# Patient Record
Sex: FEMALE | Race: ASIAN | NOT HISPANIC OR LATINO
[De-identification: names, ages, dates, MRNs, and addresses within clinical notes are randomized per-mention and may not be internally consistent; named-entity substitution may affect disease eponyms.]

---

## 2017-12-06 PROBLEM — Z00.00 ENCOUNTER FOR PREVENTIVE HEALTH EXAMINATION: Status: ACTIVE | Noted: 2017-12-06

## 2017-12-14 ENCOUNTER — APPOINTMENT (OUTPATIENT)
Dept: UROLOGY | Facility: CLINIC | Age: 52
End: 2017-12-14

## 2022-10-13 ENCOUNTER — INPATIENT (INPATIENT)
Facility: HOSPITAL | Age: 57
LOS: 5 days | Discharge: HOME | DRG: 871 | End: 2022-10-19
Attending: INTERNAL MEDICINE | Admitting: INTERNAL MEDICINE
Payer: COMMERCIAL

## 2022-10-13 VITALS
TEMPERATURE: 101 F | SYSTOLIC BLOOD PRESSURE: 87 MMHG | HEART RATE: 126 BPM | OXYGEN SATURATION: 87 % | RESPIRATION RATE: 20 BRPM | WEIGHT: 113.98 LBS | DIASTOLIC BLOOD PRESSURE: 56 MMHG

## 2022-10-13 DIAGNOSIS — M54.2 CERVICALGIA: ICD-10-CM

## 2022-10-13 DIAGNOSIS — A41.9 SEPSIS, UNSPECIFIED ORGANISM: ICD-10-CM

## 2022-10-13 LAB
ALBUMIN SERPL ELPH-MCNC: 3 G/DL — LOW (ref 3.3–5)
ALBUMIN SERPL ELPH-MCNC: 3.3 G/DL — SIGNIFICANT CHANGE UP (ref 3.3–5)
ALP SERPL-CCNC: 130 U/L — HIGH (ref 40–120)
ALP SERPL-CCNC: 147 U/L — HIGH (ref 40–120)
ALT FLD-CCNC: 46 U/L — HIGH (ref 10–45)
ALT FLD-CCNC: 49 U/L — HIGH (ref 10–45)
ANION GAP SERPL CALC-SCNC: 10 MMOL/L — SIGNIFICANT CHANGE UP (ref 5–17)
ANION GAP SERPL CALC-SCNC: 14 MMOL/L — SIGNIFICANT CHANGE UP (ref 5–17)
ANION GAP SERPL CALC-SCNC: 9 MMOL/L — SIGNIFICANT CHANGE UP (ref 5–17)
APPEARANCE UR: CLEAR — SIGNIFICANT CHANGE UP
APTT BLD: 27.6 SEC — SIGNIFICANT CHANGE UP (ref 27.5–35.5)
AST SERPL-CCNC: 33 U/L — SIGNIFICANT CHANGE UP (ref 10–40)
AST SERPL-CCNC: 35 U/L — SIGNIFICANT CHANGE UP (ref 10–40)
BASE EXCESS BLDV CALC-SCNC: 2.4 MMOL/L — SIGNIFICANT CHANGE UP (ref -2–3)
BASE EXCESS BLDV CALC-SCNC: 2.5 MMOL/L — SIGNIFICANT CHANGE UP (ref -2–3)
BASE EXCESS BLDV CALC-SCNC: 5.6 MMOL/L — HIGH (ref -2–3)
BASOPHILS # BLD AUTO: 0.05 K/UL — SIGNIFICANT CHANGE UP (ref 0–0.2)
BASOPHILS NFR BLD AUTO: 0.4 % — SIGNIFICANT CHANGE UP (ref 0–2)
BILIRUB SERPL-MCNC: 0.4 MG/DL — SIGNIFICANT CHANGE UP (ref 0.2–1.2)
BILIRUB SERPL-MCNC: 0.7 MG/DL — SIGNIFICANT CHANGE UP (ref 0.2–1.2)
BILIRUB UR-MCNC: NEGATIVE — SIGNIFICANT CHANGE UP
BLD GP AB SCN SERPL QL: NEGATIVE — SIGNIFICANT CHANGE UP
BUN SERPL-MCNC: 12 MG/DL — SIGNIFICANT CHANGE UP (ref 7–23)
BUN SERPL-MCNC: 7 MG/DL — SIGNIFICANT CHANGE UP (ref 7–23)
BUN SERPL-MCNC: 9 MG/DL — SIGNIFICANT CHANGE UP (ref 7–23)
CA-I SERPL-SCNC: 1.2 MMOL/L — SIGNIFICANT CHANGE UP (ref 1.15–1.33)
CA-I SERPL-SCNC: 1.21 MMOL/L — SIGNIFICANT CHANGE UP (ref 1.15–1.33)
CA-I SERPL-SCNC: 1.22 MMOL/L — SIGNIFICANT CHANGE UP (ref 1.15–1.33)
CALCIUM SERPL-MCNC: 8.4 MG/DL — SIGNIFICANT CHANGE UP (ref 8.4–10.5)
CALCIUM SERPL-MCNC: 9 MG/DL — SIGNIFICANT CHANGE UP (ref 8.4–10.5)
CALCIUM SERPL-MCNC: 9.4 MG/DL — SIGNIFICANT CHANGE UP (ref 8.4–10.5)
CHLORIDE BLDV-SCNC: 101 MMOL/L — SIGNIFICANT CHANGE UP (ref 96–108)
CHLORIDE BLDV-SCNC: 110 MMOL/L — HIGH (ref 96–108)
CHLORIDE BLDV-SCNC: 99 MMOL/L — SIGNIFICANT CHANGE UP (ref 96–108)
CHLORIDE SERPL-SCNC: 107 MMOL/L — SIGNIFICANT CHANGE UP (ref 96–108)
CHLORIDE SERPL-SCNC: 110 MMOL/L — HIGH (ref 96–108)
CHLORIDE SERPL-SCNC: 96 MMOL/L — SIGNIFICANT CHANGE UP (ref 96–108)
CO2 BLDV-SCNC: 28 MMOL/L — HIGH (ref 22–26)
CO2 BLDV-SCNC: 29 MMOL/L — HIGH (ref 22–26)
CO2 BLDV-SCNC: 31 MMOL/L — HIGH (ref 22–26)
CO2 SERPL-SCNC: 25 MMOL/L — SIGNIFICANT CHANGE UP (ref 22–31)
CO2 SERPL-SCNC: 26 MMOL/L — SIGNIFICANT CHANGE UP (ref 22–31)
CO2 SERPL-SCNC: 27 MMOL/L — SIGNIFICANT CHANGE UP (ref 22–31)
COLOR SPEC: COLORLESS — SIGNIFICANT CHANGE UP
CREAT SERPL-MCNC: 0.53 MG/DL — SIGNIFICANT CHANGE UP (ref 0.5–1.3)
CREAT SERPL-MCNC: 0.54 MG/DL — SIGNIFICANT CHANGE UP (ref 0.5–1.3)
CREAT SERPL-MCNC: 0.82 MG/DL — SIGNIFICANT CHANGE UP (ref 0.5–1.3)
DIFF PNL FLD: NEGATIVE — SIGNIFICANT CHANGE UP
EGFR: 107 ML/MIN/1.73M2 — SIGNIFICANT CHANGE UP
EGFR: 108 ML/MIN/1.73M2 — SIGNIFICANT CHANGE UP
EGFR: 83 ML/MIN/1.73M2 — SIGNIFICANT CHANGE UP
EOSINOPHIL # BLD AUTO: 0.19 K/UL — SIGNIFICANT CHANGE UP (ref 0–0.5)
EOSINOPHIL NFR BLD AUTO: 1.4 % — SIGNIFICANT CHANGE UP (ref 0–6)
FLUAV AG NPH QL: SIGNIFICANT CHANGE UP
FLUBV AG NPH QL: SIGNIFICANT CHANGE UP
GAS PNL BLDV: 131 MMOL/L — LOW (ref 136–145)
GAS PNL BLDV: 133 MMOL/L — LOW (ref 136–145)
GAS PNL BLDV: 141 MMOL/L — SIGNIFICANT CHANGE UP (ref 136–145)
GAS PNL BLDV: SIGNIFICANT CHANGE UP
GLUCOSE BLDV-MCNC: 132 MG/DL — HIGH (ref 70–99)
GLUCOSE BLDV-MCNC: 132 MG/DL — HIGH (ref 70–99)
GLUCOSE BLDV-MCNC: 194 MG/DL — HIGH (ref 70–99)
GLUCOSE SERPL-MCNC: 128 MG/DL — HIGH (ref 70–99)
GLUCOSE SERPL-MCNC: 133 MG/DL — HIGH (ref 70–99)
GLUCOSE SERPL-MCNC: 236 MG/DL — HIGH (ref 70–99)
GLUCOSE UR QL: NEGATIVE — SIGNIFICANT CHANGE UP
HCO3 BLDV-SCNC: 27 MMOL/L — SIGNIFICANT CHANGE UP (ref 22–29)
HCO3 BLDV-SCNC: 27 MMOL/L — SIGNIFICANT CHANGE UP (ref 22–29)
HCO3 BLDV-SCNC: 30 MMOL/L — HIGH (ref 22–29)
HCT VFR BLD CALC: 21.5 % — LOW (ref 34.5–45)
HCT VFR BLD CALC: 26.1 % — LOW (ref 34.5–45)
HCT VFR BLDA CALC: 22 % — LOW (ref 34.5–46.5)
HCT VFR BLDA CALC: 22 % — LOW (ref 34.5–46.5)
HCT VFR BLDA CALC: 26 % — LOW (ref 34.5–46.5)
HGB BLD CALC-MCNC: 7.2 G/DL — LOW (ref 11.7–16.1)
HGB BLD CALC-MCNC: 7.2 G/DL — LOW (ref 11.7–16.1)
HGB BLD CALC-MCNC: 8.5 G/DL — LOW (ref 11.7–16.1)
HGB BLD-MCNC: 6.9 G/DL — CRITICAL LOW (ref 11.5–15.5)
HGB BLD-MCNC: 8.5 G/DL — LOW (ref 11.5–15.5)
IMM GRANULOCYTES NFR BLD AUTO: 0.9 % — SIGNIFICANT CHANGE UP (ref 0–0.9)
INR BLD: 1.51 RATIO — HIGH (ref 0.88–1.16)
KETONES UR-MCNC: SIGNIFICANT CHANGE UP
LACTATE BLDV-MCNC: 1 MMOL/L — SIGNIFICANT CHANGE UP (ref 0.5–2)
LACTATE BLDV-MCNC: 1 MMOL/L — SIGNIFICANT CHANGE UP (ref 0.5–2)
LACTATE BLDV-MCNC: 1.9 MMOL/L — SIGNIFICANT CHANGE UP (ref 0.5–2)
LEUKOCYTE ESTERASE UR-ACNC: NEGATIVE — SIGNIFICANT CHANGE UP
LIDOCAIN IGE QN: 30 U/L — SIGNIFICANT CHANGE UP (ref 7–60)
LYMPHOCYTES # BLD AUTO: 1.68 K/UL — SIGNIFICANT CHANGE UP (ref 1–3.3)
LYMPHOCYTES # BLD AUTO: 12 % — LOW (ref 13–44)
MAGNESIUM SERPL-MCNC: 2 MG/DL — SIGNIFICANT CHANGE UP (ref 1.6–2.6)
MAGNESIUM SERPL-MCNC: 2.2 MG/DL — SIGNIFICANT CHANGE UP (ref 1.6–2.6)
MCHC RBC-ENTMCNC: 29 PG — SIGNIFICANT CHANGE UP (ref 27–34)
MCHC RBC-ENTMCNC: 29.2 PG — SIGNIFICANT CHANGE UP (ref 27–34)
MCHC RBC-ENTMCNC: 32.1 GM/DL — SIGNIFICANT CHANGE UP (ref 32–36)
MCHC RBC-ENTMCNC: 32.6 GM/DL — SIGNIFICANT CHANGE UP (ref 32–36)
MCV RBC AUTO: 89.7 FL — SIGNIFICANT CHANGE UP (ref 80–100)
MCV RBC AUTO: 90.3 FL — SIGNIFICANT CHANGE UP (ref 80–100)
MONOCYTES # BLD AUTO: 0.73 K/UL — SIGNIFICANT CHANGE UP (ref 0–0.9)
MONOCYTES NFR BLD AUTO: 5.2 % — SIGNIFICANT CHANGE UP (ref 2–14)
NEUTROPHILS # BLD AUTO: 11.2 K/UL — HIGH (ref 1.8–7.4)
NEUTROPHILS NFR BLD AUTO: 80.1 % — HIGH (ref 43–77)
NITRITE UR-MCNC: NEGATIVE — SIGNIFICANT CHANGE UP
NRBC # BLD: 0 /100 WBCS — SIGNIFICANT CHANGE UP (ref 0–0)
NRBC # BLD: 0 /100 WBCS — SIGNIFICANT CHANGE UP (ref 0–0)
PCO2 BLDV: 41 MMHG — SIGNIFICANT CHANGE UP (ref 39–42)
PCO2 BLDV: 42 MMHG — SIGNIFICANT CHANGE UP (ref 39–42)
PCO2 BLDV: 43 MMHG — HIGH (ref 39–42)
PH BLDV: 7.41 — SIGNIFICANT CHANGE UP (ref 7.32–7.43)
PH BLDV: 7.42 — SIGNIFICANT CHANGE UP (ref 7.32–7.43)
PH BLDV: 7.47 — HIGH (ref 7.32–7.43)
PH UR: 6.5 — SIGNIFICANT CHANGE UP (ref 5–8)
PHOSPHATE SERPL-MCNC: 1.8 MG/DL — LOW (ref 2.5–4.5)
PHOSPHATE SERPL-MCNC: 2.8 MG/DL — SIGNIFICANT CHANGE UP (ref 2.5–4.5)
PLATELET # BLD AUTO: 308 K/UL — SIGNIFICANT CHANGE UP (ref 150–400)
PLATELET # BLD AUTO: 318 K/UL — SIGNIFICANT CHANGE UP (ref 150–400)
PO2 BLDV: 39 MMHG — SIGNIFICANT CHANGE UP (ref 25–45)
PO2 BLDV: 45 MMHG — SIGNIFICANT CHANGE UP (ref 25–45)
PO2 BLDV: 46 MMHG — HIGH (ref 25–45)
POTASSIUM BLDV-SCNC: 2.4 MMOL/L — CRITICAL LOW (ref 3.5–5.1)
POTASSIUM BLDV-SCNC: 2.7 MMOL/L — CRITICAL LOW (ref 3.5–5.1)
POTASSIUM BLDV-SCNC: 3.8 MMOL/L — SIGNIFICANT CHANGE UP (ref 3.5–5.1)
POTASSIUM SERPL-MCNC: 2.7 MMOL/L — CRITICAL LOW (ref 3.5–5.3)
POTASSIUM SERPL-MCNC: 2.9 MMOL/L — CRITICAL LOW (ref 3.5–5.3)
POTASSIUM SERPL-MCNC: 4 MMOL/L — SIGNIFICANT CHANGE UP (ref 3.5–5.3)
POTASSIUM SERPL-SCNC: 2.7 MMOL/L — CRITICAL LOW (ref 3.5–5.3)
POTASSIUM SERPL-SCNC: 2.9 MMOL/L — CRITICAL LOW (ref 3.5–5.3)
POTASSIUM SERPL-SCNC: 4 MMOL/L — SIGNIFICANT CHANGE UP (ref 3.5–5.3)
PROCALCITONIN SERPL-MCNC: 6.82 NG/ML — HIGH (ref 0.02–0.1)
PROT SERPL-MCNC: 6 G/DL — SIGNIFICANT CHANGE UP (ref 6–8.3)
PROT SERPL-MCNC: 6.8 G/DL — SIGNIFICANT CHANGE UP (ref 6–8.3)
PROT UR-MCNC: NEGATIVE — SIGNIFICANT CHANGE UP
PROTHROM AB SERPL-ACNC: 17.6 SEC — HIGH (ref 10.5–13.4)
RAPID RVP RESULT: SIGNIFICANT CHANGE UP
RAPID RVP RESULT: SIGNIFICANT CHANGE UP
RBC # BLD: 2.38 M/UL — LOW (ref 3.8–5.2)
RBC # BLD: 2.91 M/UL — LOW (ref 3.8–5.2)
RBC # FLD: 13.1 % — SIGNIFICANT CHANGE UP (ref 10.3–14.5)
RBC # FLD: 13.4 % — SIGNIFICANT CHANGE UP (ref 10.3–14.5)
RH IG SCN BLD-IMP: POSITIVE — SIGNIFICANT CHANGE UP
RSV RNA NPH QL NAA+NON-PROBE: SIGNIFICANT CHANGE UP
SAO2 % BLDV: 64.7 % — LOW (ref 67–88)
SAO2 % BLDV: 74.9 % — SIGNIFICANT CHANGE UP (ref 67–88)
SAO2 % BLDV: 79.2 % — SIGNIFICANT CHANGE UP (ref 67–88)
SARS-COV-2 RNA SPEC QL NAA+PROBE: SIGNIFICANT CHANGE UP
SODIUM SERPL-SCNC: 136 MMOL/L — SIGNIFICANT CHANGE UP (ref 135–145)
SODIUM SERPL-SCNC: 143 MMOL/L — SIGNIFICANT CHANGE UP (ref 135–145)
SODIUM SERPL-SCNC: 145 MMOL/L — SIGNIFICANT CHANGE UP (ref 135–145)
SP GR SPEC: 1.01 — LOW (ref 1.01–1.02)
T4 FREE SERPL-MCNC: 4.8 NG/DL — HIGH (ref 0.9–1.8)
TROPONIN T, HIGH SENSITIVITY RESULT: 229 NG/L — HIGH (ref 0–51)
TROPONIN T, HIGH SENSITIVITY RESULT: 53 NG/L — HIGH (ref 0–51)
TROPONIN T, HIGH SENSITIVITY RESULT: 91 NG/L — HIGH (ref 0–51)
UROBILINOGEN FLD QL: NEGATIVE — SIGNIFICANT CHANGE UP
WBC # BLD: 13.98 K/UL — HIGH (ref 3.8–10.5)
WBC # BLD: 9.82 K/UL — SIGNIFICANT CHANGE UP (ref 3.8–10.5)
WBC # FLD AUTO: 13.98 K/UL — HIGH (ref 3.8–10.5)
WBC # FLD AUTO: 9.82 K/UL — SIGNIFICANT CHANGE UP (ref 3.8–10.5)

## 2022-10-13 PROCEDURE — 70491 CT SOFT TISSUE NECK W/DYE: CPT | Mod: 26

## 2022-10-13 PROCEDURE — 31575 DIAGNOSTIC LARYNGOSCOPY: CPT

## 2022-10-13 PROCEDURE — 71045 X-RAY EXAM CHEST 1 VIEW: CPT | Mod: 26

## 2022-10-13 PROCEDURE — 99291 CRITICAL CARE FIRST HOUR: CPT

## 2022-10-13 PROCEDURE — 99291 CRITICAL CARE FIRST HOUR: CPT | Mod: 25,GC

## 2022-10-13 PROCEDURE — 71275 CT ANGIOGRAPHY CHEST: CPT | Mod: 26

## 2022-10-13 PROCEDURE — 93308 TTE F-UP OR LMTD: CPT | Mod: 26

## 2022-10-13 PROCEDURE — 99222 1ST HOSP IP/OBS MODERATE 55: CPT | Mod: 25

## 2022-10-13 RX ORDER — PIPERACILLIN AND TAZOBACTAM 4; .5 G/20ML; G/20ML
3.38 INJECTION, POWDER, LYOPHILIZED, FOR SOLUTION INTRAVENOUS ONCE
Refills: 0 | Status: DISCONTINUED | OUTPATIENT
Start: 2022-10-13 | End: 2022-10-19

## 2022-10-13 RX ORDER — POTASSIUM CHLORIDE 20 MEQ
10 PACKET (EA) ORAL
Refills: 0 | Status: DISCONTINUED | OUTPATIENT
Start: 2022-10-13 | End: 2022-10-13

## 2022-10-13 RX ORDER — HYDROCORTISONE 20 MG
100 TABLET ORAL ONCE
Refills: 0 | Status: COMPLETED | OUTPATIENT
Start: 2022-10-13 | End: 2022-10-13

## 2022-10-13 RX ORDER — CHLORHEXIDINE GLUCONATE 213 G/1000ML
1 SOLUTION TOPICAL
Refills: 0 | Status: DISCONTINUED | OUTPATIENT
Start: 2022-10-13 | End: 2022-10-19

## 2022-10-13 RX ORDER — ACETAMINOPHEN 500 MG
1000 TABLET ORAL ONCE
Refills: 0 | Status: COMPLETED | OUTPATIENT
Start: 2022-10-13 | End: 2022-10-13

## 2022-10-13 RX ORDER — POTASSIUM CHLORIDE 20 MEQ
40 PACKET (EA) ORAL ONCE
Refills: 0 | Status: COMPLETED | OUTPATIENT
Start: 2022-10-13 | End: 2022-10-13

## 2022-10-13 RX ORDER — SODIUM CHLORIDE 9 MG/ML
1000 INJECTION, SOLUTION INTRAVENOUS ONCE
Refills: 0 | Status: COMPLETED | OUTPATIENT
Start: 2022-10-13 | End: 2022-10-13

## 2022-10-13 RX ORDER — SODIUM CHLORIDE 9 MG/ML
1600 INJECTION, SOLUTION INTRAVENOUS ONCE
Refills: 0 | Status: COMPLETED | OUTPATIENT
Start: 2022-10-13 | End: 2022-10-13

## 2022-10-13 RX ORDER — POTASSIUM PHOSPHATE, MONOBASIC POTASSIUM PHOSPHATE, DIBASIC 236; 224 MG/ML; MG/ML
30 INJECTION, SOLUTION INTRAVENOUS ONCE
Refills: 0 | Status: COMPLETED | OUTPATIENT
Start: 2022-10-13 | End: 2022-10-13

## 2022-10-13 RX ORDER — ENOXAPARIN SODIUM 100 MG/ML
40 INJECTION SUBCUTANEOUS EVERY 24 HOURS
Refills: 0 | Status: DISCONTINUED | OUTPATIENT
Start: 2022-10-13 | End: 2022-10-19

## 2022-10-13 RX ORDER — VANCOMYCIN HCL 1 G
750 VIAL (EA) INTRAVENOUS ONCE
Refills: 0 | Status: COMPLETED | OUTPATIENT
Start: 2022-10-13 | End: 2022-10-13

## 2022-10-13 RX ORDER — PANTOPRAZOLE SODIUM 20 MG/1
40 TABLET, DELAYED RELEASE ORAL DAILY
Refills: 0 | Status: DISCONTINUED | OUTPATIENT
Start: 2022-10-13 | End: 2022-10-13

## 2022-10-13 RX ORDER — MAGNESIUM SULFATE 500 MG/ML
1 VIAL (ML) INJECTION ONCE
Refills: 0 | Status: COMPLETED | OUTPATIENT
Start: 2022-10-13 | End: 2022-10-13

## 2022-10-13 RX ORDER — PIPERACILLIN AND TAZOBACTAM 4; .5 G/20ML; G/20ML
3.38 INJECTION, POWDER, LYOPHILIZED, FOR SOLUTION INTRAVENOUS EVERY 8 HOURS
Refills: 0 | Status: DISCONTINUED | OUTPATIENT
Start: 2022-10-14 | End: 2022-10-19

## 2022-10-13 RX ORDER — PIPERACILLIN AND TAZOBACTAM 4; .5 G/20ML; G/20ML
3.38 INJECTION, POWDER, LYOPHILIZED, FOR SOLUTION INTRAVENOUS ONCE
Refills: 0 | Status: COMPLETED | OUTPATIENT
Start: 2022-10-13 | End: 2022-10-13

## 2022-10-13 RX ORDER — PIPERACILLIN AND TAZOBACTAM 4; .5 G/20ML; G/20ML
3.38 INJECTION, POWDER, LYOPHILIZED, FOR SOLUTION INTRAVENOUS ONCE
Refills: 0 | Status: COMPLETED | OUTPATIENT
Start: 2022-10-14 | End: 2022-10-13

## 2022-10-13 RX ORDER — VANCOMYCIN HCL 1 G
VIAL (EA) INTRAVENOUS
Refills: 0 | Status: DISCONTINUED | OUTPATIENT
Start: 2022-10-13 | End: 2022-10-13

## 2022-10-13 RX ORDER — NOREPINEPHRINE BITARTRATE/D5W 8 MG/250ML
0.05 PLASTIC BAG, INJECTION (ML) INTRAVENOUS
Qty: 8 | Refills: 0 | Status: DISCONTINUED | OUTPATIENT
Start: 2022-10-13 | End: 2022-10-13

## 2022-10-13 RX ADMIN — PANTOPRAZOLE SODIUM 40 MILLIGRAM(S): 20 TABLET, DELAYED RELEASE ORAL at 13:16

## 2022-10-13 RX ADMIN — SODIUM CHLORIDE 1000 MILLILITER(S): 9 INJECTION, SOLUTION INTRAVENOUS at 09:55

## 2022-10-13 RX ADMIN — Medication 40 MILLIEQUIVALENT(S): at 17:32

## 2022-10-13 RX ADMIN — PIPERACILLIN AND TAZOBACTAM 200 GRAM(S): 4; .5 INJECTION, POWDER, LYOPHILIZED, FOR SOLUTION INTRAVENOUS at 07:40

## 2022-10-13 RX ADMIN — Medication 1000 MILLIGRAM(S): at 08:00

## 2022-10-13 RX ADMIN — SODIUM CHLORIDE 1000 MILLILITER(S): 9 INJECTION, SOLUTION INTRAVENOUS at 08:55

## 2022-10-13 RX ADMIN — CHLORHEXIDINE GLUCONATE 1 APPLICATION(S): 213 SOLUTION TOPICAL at 13:16

## 2022-10-13 RX ADMIN — Medication 100 MILLIEQUIVALENT(S): at 10:30

## 2022-10-13 RX ADMIN — PIPERACILLIN AND TAZOBACTAM 3.38 GRAM(S): 4; .5 INJECTION, POWDER, LYOPHILIZED, FOR SOLUTION INTRAVENOUS at 08:20

## 2022-10-13 RX ADMIN — Medication 400 MILLIGRAM(S): at 07:35

## 2022-10-13 RX ADMIN — POTASSIUM PHOSPHATE, MONOBASIC POTASSIUM PHOSPHATE, DIBASIC 83.33 MILLIMOLE(S): 236; 224 INJECTION, SOLUTION INTRAVENOUS at 18:02

## 2022-10-13 RX ADMIN — Medication 1000 MILLIGRAM(S): at 07:55

## 2022-10-13 RX ADMIN — Medication 4.85 MICROGRAM(S)/KG/MIN: at 09:35

## 2022-10-13 RX ADMIN — Medication 40 MILLIEQUIVALENT(S): at 09:25

## 2022-10-13 RX ADMIN — PIPERACILLIN AND TAZOBACTAM 200 GRAM(S): 4; .5 INJECTION, POWDER, LYOPHILIZED, FOR SOLUTION INTRAVENOUS at 13:16

## 2022-10-13 RX ADMIN — Medication 1 GRAM(S): at 10:30

## 2022-10-13 RX ADMIN — SODIUM CHLORIDE 1600 MILLILITER(S): 9 INJECTION, SOLUTION INTRAVENOUS at 07:30

## 2022-10-13 RX ADMIN — Medication 20 MILLIGRAM(S): at 18:35

## 2022-10-13 RX ADMIN — Medication 100 GRAM(S): at 09:26

## 2022-10-13 RX ADMIN — Medication 40 MILLIEQUIVALENT(S): at 20:26

## 2022-10-13 RX ADMIN — SODIUM CHLORIDE 1600 MILLILITER(S): 9 INJECTION, SOLUTION INTRAVENOUS at 08:30

## 2022-10-13 RX ADMIN — Medication 100 MILLIGRAM(S): at 08:21

## 2022-10-13 RX ADMIN — Medication 250 MILLIGRAM(S): at 08:27

## 2022-10-13 RX ADMIN — PIPERACILLIN AND TAZOBACTAM 25 GRAM(S): 4; .5 INJECTION, POWDER, LYOPHILIZED, FOR SOLUTION INTRAVENOUS at 23:16

## 2022-10-13 NOTE — ED ADULT NURSE NOTE - ED STAT RN HANDOFF DETAILS
hand off given to break coverage RN. Resp paged. ICU team paged. Pt to go to CT first prior to transport to MICU with team in attendance. A&Ox4. Levophed infusing via IV pump at 4.85 ml/hr. Potassium infusing via IV pump hand off given to break coverage ELYSSA Valdez. Resp paged. ICU team paged. Pt to go to CT first prior to transport to MICU with team in attendance. A&Ox4. Levophed infusing via IV pump at 4.85 ml/hr. Potassium infusing via IV pump

## 2022-10-13 NOTE — CHART NOTE - NSCHARTNOTEFT_GEN_A_CORE
MICU DOWN GRADE NOTE    ----------------------------------------      Transfer from: MICU   Transfer to: ( X  ) Medicine- with continuous Pulse Ox         (  ) Telemetry         (   )  RCU     (   ) Palliative          (    ) Stroke Unit    Accepting Physician:   ----------------------------------------  HPI:    Sanna Allen is a 56F with hx of HTN, RA on leflunomide, anxiety who p/w fever, chills, cough, n/v, decreased po intake. Patient reports that she received an EGD two weeks ago and developed a sore throat shortly thereafter. Since then has had worsening neck pain. Around 9 days ago started developing fevers T max 107, chills, diaphoresis that did not resolve despite taking tylenol. Went to PCP who prescribed azithromycin and levofloxacin, completed course. however continued to have fevers, chills. Became nauseous a few days ago and started vomiting soon after she starts eating. NBNB, just food colored. Subsequent decreased pO intake, only able to take rice pudding. Reports that her neck and jaw continue to hurt. No dysphagia, no abdominal pain, no cough, chest pain. This morning reports that she took her pulse ox and found to have low oxygen sat mid80s% and came to the ED for further evaluation. Last steroids was in 2022, x2 weeks, completed.    Patient accepted to MICU for airway monitoring and protection. In the ICU, patient was weaned off pressors, remained hemodynamically stable. Zosyn was started for RADHA PNA, CT Neck found diffuse thyroiditis.    NEURO:  #Mental status: baseline AAO4  -Currently A&O x 4  - Continue to mon	or neurological status    CV:  #Septic shock, suspected viral syndrome complicated by PNA vs Aspiration (resolved)  - started on Levophed but able to titrate off while in ICU  - Continue Zosyn for aspiration PNA (already rcvd atypical coverage as outpatient)  - POCUS with B-lines on left; preserved RV/LV; LV>RV    # Tropinemia      # hx of HTN    PULM:  #Acute Hypoxic Respiratory Failure due to multilobar PNA  - Currently on HFNC 50%/40Lpm  - Wean down as tolerated    RENAL:  - no acute issues  - continue to monitor per renal function per ICU protocol    GI:  - diet regular  - hx of gerd  - c/w protonix 40 mg IV PPI    ENDO:  # radiographic evidence of thyroiditis  - check TFTs    METABOLIC:  # Electrolyte Abnormalities  K+ 2.7 on latest BMP; Phos 1.8 as well; likely in s/o recurrent vomiting   - Repleted with KCl and KPhos   - reassess on follow-up BMP    HEMATOLOGIC:  #DVT prophylaxis with lovenox    ID:  #sepsis 2/2 multilobar pneumonia  - Continue Zosyn for possible aspiration PNA (already rcvd atypical coverage as outpatient)    SKIN:  - no acute issues    To Follow UP  [ ] Wean down on HFNC as able   [ ] Continue Abx for PNA  [ ] Follow-up with GI doc Dr. Jermaine Hill regarding outpt endoscopy reason and results        - Phone number is [phone]    REVIEW OF SYSTEMS:  CONSTITUTIONAL: +fever, chills, night sweats, =fatigue  EYES: No eye pain, visual disturbances, or discharge  ENMT:  No difficulty hearing, tinnitus, vertigo; No sinus or throat pain  NECK: +pain, no stiffness  RESPIRATORY: No cough, wheezing, or hemoptysis; +shortness of breath  CARDIOVASCULAR: No chest pain, palpitations, dizziness, or leg swelling  GASTROINTESTINAL: No abdominal or epigastric pain. +nausea, vomiting, no hematemesis; No diarrhea or constipation. No melena or hematochezia.  GENITOURINARY: No dysuria, frequency, hematuria, or incontinence  NEUROLOGICAL: No headaches, memory loss, loss of strength, numbness, or tremors  SKIN: No itching, burning, rashes, or lesions   LYMPH NODES: No enlarged glands  ENDOCRINE: No heat or cold intolerance; No hair loss  MUSCULOSKELETAL: No joint pain or swelling; No muscle, back, or extremity pain  PSYCHIATRIC: No depression, anxiety, mood swings, or difficulty sleeping  HEME/LYMPH: No easy bruising, or bleeding gums  ALLERGY AND IMMUNOLOGIC: No hives or eczema    MEDICATIONS:  chlorhexidine 4% Liquid 1 Application(s) Topical <User Schedule>  enoxaparin Injectable 40 milliGRAM(s) SubCutaneous every 24 hours  norepinephrine Infusion 0.05 MICROgram(s)/kG/Min IV Continuous <Continuous>  pantoprazole  Injectable 40 milliGRAM(s) IV Push daily  PARoxetine 20 milliGRAM(s) Oral daily  piperacillin/tazobactam IVPB.. 3.375 Gram(s) IV Intermittent once  potassium chloride   Powder 40 milliEquivalent(s) Oral once  potassium chloride  10 mEq/100 mL IVPB 10 milliEquivalent(s) IV Intermittent every 1 hour  potassium phosphate IVPB 30 milliMole(s) IV Intermittent once      T(C): 36.6 (10-13-22 @ 16:00), Max: 39.4 (10-13-22 @ 07:26)  HR: 92 (10-13-22 @ 16:50) (82 - 193)  BP: 109/57 (10-13-22 @ 16:00) (87/47 - 138/73)  RR: 25 (10-13-22 @ 16:50) (18 - 32)  SpO2: 94% (10-13-22 @ 16:50) (87% - 100%)  Wt(kg): --Vital Signs Last 24 Hrs  T(C): 36.6 (13 Oct 2022 16:00), Max: 39.4 (13 Oct 2022 07:26)  T(F): 97.8 (13 Oct 2022 16:00), Max: 103 (13 Oct 2022 07:26)  HR: 92 (13 Oct 2022 16:50) (82 - 193)  BP: 109/57 (13 Oct 2022 16:00) (87/47 - 138/73)  BP(mean): 78 (13 Oct 2022 16:00) (70 - 98)  RR: 25 (13 Oct 2022 16:50) (18 - 32)  SpO2: 94% (13 Oct 2022 16:50) (87% - 100%)    Parameters below as of 13 Oct 2022 16:50  Patient On (Oxygen Delivery Method): room air    PHYSICAL EXAM:  GENERAL: lying comfortable in bed, in no acute distress  HENMT: Atraumatic, dry mucous membranes, no oropharyngeal exudates or vesicles, uvula is midline, tender to palpation at b/l jaw and right neck, no masses or nodules, no edema, no fluctuance noted  EYES: Clear bilaterally, PERRL, EOMs intact b/l  HEART: RRR, S1/S2, no murmur/gallops/rubs  RESPIRATORY: bibasilar crackles L>R, no wheezes or rhonchi  ABDOMEN: +BS, soft, nontender, nondistended  EXTREMITIES: No lower extremity edema, +2 radial pulses b/l  NEURO:  A&Ox4, no focal motor deficits or sensory deficits   Heme/LYMPH: No ecchymosis or bruising, no anterior/posterior cervical or supraclavicular LAD  SKIN:  Skin normal color for race, warm, dry and intact. No evidence of rash    Consultant(s) Notes Reviewed:  [x ] YES  [ ] NO  Care Discussed with Consultants/Other Providers [ x] YES  [ ] NO    LABS:                        8.5    13.98 )-----------( 318      ( 13 Oct 2022 08:17 )             26.1     10-13    143  |  107  |  7   ----------------------------<  236<H>  2.7<LL>   |  27  |  0.54    Ca    9.0      13 Oct 2022 16:05  Phos  1.8     10-13  Mg     2.2     10-13    TPro  6.0  /  Alb  3.0<L>  /  TBili  0.4  /  DBili  x   /  AST  35  /  ALT  46<H>  /  AlkPhos  130<H>  10-13    PT/INR - ( 13 Oct 2022 08:17 )   PT: 17.6 sec;   INR: 1.51 ratio         PTT - ( 13 Oct 2022 08:17 )  PTT:27.6 sec  Urinalysis Basic - ( 13 Oct 2022 10:48 )    Color: Colorless / Appearance: Clear / S.006 / pH: x  Gluc: x / Ketone: Trace  / Bili: Negative / Urobili: Negative   Blood: x / Protein: Negative / Nitrite: Negative   Leuk Esterase: Negative / RBC: x / WBC x   Sq Epi: x / Non Sq Epi: x / Bacteria: x      CAPILLARY BLOOD GLUCOSE    Urinalysis Basic - ( 13 Oct 2022 10:48 )    Color: Colorless / Appearance: Clear / S.006 / pH: x  Gluc: x / Ketone: Trace  / Bili: Negative / Urobili: Negative   Blood: x / Protein: Negative / Nitrite: Negative   Leuk Esterase: Negative / RBC: x / WBC x   Sq Epi: x / Non Sq Epi: x / Bacteria: x        RADIOLOGY & ADDITIONAL TESTS:    Imaging Personally Reviewed:  [x ] YES  [ ] NO MICU DOWN GRADE NOTE    ----------------------------------------      Transfer from: MICU   Transfer to: ( X  ) Medicine      (  ) Telemetry         (   )  RCU     (   ) Palliative          (    ) Stroke Unit    Accepting Physician: Dr Delmar Camarillo  ----------------------------------------  HPI:    Sanna Allen is a 56F with hx of HTN, RA on leflunomide, anxiety who p/w fever, chills, cough, n/v, decreased po intake. Patient reports that she received an EGD two weeks ago and developed a sore throat shortly thereafter. Since then has had worsening neck pain. Around 9 days ago started developing fevers T max 107, chills, diaphoresis that did not resolve despite taking tylenol. Went to PCP who prescribed azithromycin and levofloxacin, completed course. however continued to have fevers, chills. Became nauseous a few days ago and started vomiting soon after she starts eating. NBNB, just food colored. Subsequent decreased pO intake, only able to take rice pudding. Reports that her neck and jaw continue to hurt. No dysphagia, no abdominal pain, no cough, chest pain. This morning reports that she took her pulse ox and found to have low oxygen sat mid80s% and came to the ED for further evaluation. Last steroids was in 2022, x2 weeks, completed.    Patient accepted to MICU for airway monitoring and protection. In the ICU, patient was weaned off pressors, remained hemodynamically stable. Zosyn was started for RADHA PNA, CT Neck found diffuse thyroiditis.    NEURO:  #Mental status: baseline AAO4  -Currently A&O x 4  - Continue to mon	or neurological status    CV:  #Septic shock, suspected viral syndrome complicated by PNA vs Aspiration (resolved)  - started on Levophed but able to titrate off while in ICU  - Continue Zosyn for aspiration PNA (already rcvd atypical coverage as outpatient)  - POCUS with B-lines on left; preserved RV/LV; LV>RV    # Tropinemia      # hx of HTN    PULM:  #Acute Hypoxic Respiratory Failure due to multilobar PNA  - Currently on HFNC 50%/40Lpm  - Wean down as tolerated    RENAL:  - no acute issues  - continue to monitor per renal function per ICU protocol    GI:  - diet regular  - hx of gerd  - c/w protonix 40 mg IV PPI    ENDO:  # radiographic evidence of thyroiditis  - check TFTs    METABOLIC:  # Electrolyte Abnormalities  K+ 2.7 on latest BMP; Phos 1.8 as well; likely in s/o recurrent vomiting   - Repleted with KCl and KPhos   - reassess on follow-up BMP    HEMATOLOGIC:  #DVT prophylaxis with lovenox    ID:  #sepsis 2/2 multilobar pneumonia  - Continue Zosyn for possible aspiration PNA (already rcvd atypical coverage as outpatient)    SKIN:  - no acute issues    To Follow UP  [ ] Wean down on HFNC as able   [ ] Continue Abx for PNA  [ ] Follow-up with GI doc Dr. Jermaine Hill regarding outpt endoscopy reason and results        - Phone number is [phone]    REVIEW OF SYSTEMS:  CONSTITUTIONAL: +fever, chills, night sweats, =fatigue  EYES: No eye pain, visual disturbances, or discharge  ENMT:  No difficulty hearing, tinnitus, vertigo; No sinus or throat pain  NECK: +pain, no stiffness  RESPIRATORY: No cough, wheezing, or hemoptysis; +shortness of breath  CARDIOVASCULAR: No chest pain, palpitations, dizziness, or leg swelling  GASTROINTESTINAL: No abdominal or epigastric pain. +nausea, vomiting, no hematemesis; No diarrhea or constipation. No melena or hematochezia.  GENITOURINARY: No dysuria, frequency, hematuria, or incontinence  NEUROLOGICAL: No headaches, memory loss, loss of strength, numbness, or tremors  SKIN: No itching, burning, rashes, or lesions   LYMPH NODES: No enlarged glands  ENDOCRINE: No heat or cold intolerance; No hair loss  MUSCULOSKELETAL: No joint pain or swelling; No muscle, back, or extremity pain  PSYCHIATRIC: No depression, anxiety, mood swings, or difficulty sleeping  HEME/LYMPH: No easy bruising, or bleeding gums  ALLERGY AND IMMUNOLOGIC: No hives or eczema    MEDICATIONS:  chlorhexidine 4% Liquid 1 Application(s) Topical <User Schedule>  enoxaparin Injectable 40 milliGRAM(s) SubCutaneous every 24 hours  norepinephrine Infusion 0.05 MICROgram(s)/kG/Min IV Continuous <Continuous>  pantoprazole  Injectable 40 milliGRAM(s) IV Push daily  PARoxetine 20 milliGRAM(s) Oral daily  piperacillin/tazobactam IVPB.. 3.375 Gram(s) IV Intermittent once  potassium chloride   Powder 40 milliEquivalent(s) Oral once  potassium chloride  10 mEq/100 mL IVPB 10 milliEquivalent(s) IV Intermittent every 1 hour  potassium phosphate IVPB 30 milliMole(s) IV Intermittent once      T(C): 36.6 (10-13-22 @ 16:00), Max: 39.4 (10-13-22 @ 07:26)  HR: 92 (10-13-22 @ 16:50) (82 - 193)  BP: 109/57 (10-13-22 @ 16:00) (87/47 - 138/73)  RR: 25 (10-13-22 @ 16:50) (18 - 32)  SpO2: 94% (10-13-22 @ 16:50) (87% - 100%)  Wt(kg): --Vital Signs Last 24 Hrs  T(C): 36.6 (13 Oct 2022 16:00), Max: 39.4 (13 Oct 2022 07:26)  T(F): 97.8 (13 Oct 2022 16:00), Max: 103 (13 Oct 2022 07:26)  HR: 92 (13 Oct 2022 16:50) (82 - 193)  BP: 109/57 (13 Oct 2022 16:00) (87/47 - 138/73)  BP(mean): 78 (13 Oct 2022 16:00) (70 - 98)  RR: 25 (13 Oct 2022 16:50) (18 - 32)  SpO2: 94% (13 Oct 2022 16:50) (87% - 100%)    Parameters below as of 13 Oct 2022 16:50  Patient On (Oxygen Delivery Method): room air    PHYSICAL EXAM:  GENERAL: lying comfortable in bed, in no acute distress  HENMT: Atraumatic, dry mucous membranes, no oropharyngeal exudates or vesicles, uvula is midline, tender to palpation at b/l jaw and right neck, no masses or nodules, no edema, no fluctuance noted  EYES: Clear bilaterally, PERRL, EOMs intact b/l  HEART: RRR, S1/S2, no murmur/gallops/rubs  RESPIRATORY: bibasilar crackles L>R, no wheezes or rhonchi  ABDOMEN: +BS, soft, nontender, nondistended  EXTREMITIES: No lower extremity edema, +2 radial pulses b/l  NEURO:  A&Ox4, no focal motor deficits or sensory deficits   Heme/LYMPH: No ecchymosis or bruising, no anterior/posterior cervical or supraclavicular LAD  SKIN:  Skin normal color for race, warm, dry and intact. No evidence of rash    Consultant(s) Notes Reviewed:  [x ] YES  [ ] NO  Care Discussed with Consultants/Other Providers [ x] YES  [ ] NO    LABS:                        8.5    13.98 )-----------( 318      ( 13 Oct 2022 08:17 )             26.1     10-    143  |  107  |  7   ----------------------------<  236<H>  2.7<LL>   |  27  |  0.54    Ca    9.0      13 Oct 2022 16:05  Phos  1.8     10-13  Mg     2.2     10-13    TPro  6.0  /  Alb  3.0<L>  /  TBili  0.4  /  DBili  x   /  AST  35  /  ALT  46<H>  /  AlkPhos  130<H>  10-13    PT/INR - ( 13 Oct 2022 08:17 )   PT: 17.6 sec;   INR: 1.51 ratio         PTT - ( 13 Oct 2022 08:17 )  PTT:27.6 sec  Urinalysis Basic - ( 13 Oct 2022 10:48 )    Color: Colorless / Appearance: Clear / S.006 / pH: x  Gluc: x / Ketone: Trace  / Bili: Negative / Urobili: Negative   Blood: x / Protein: Negative / Nitrite: Negative   Leuk Esterase: Negative / RBC: x / WBC x   Sq Epi: x / Non Sq Epi: x / Bacteria: x      CAPILLARY BLOOD GLUCOSE    Urinalysis Basic - ( 13 Oct 2022 10:48 )    Color: Colorless / Appearance: Clear / S.006 / pH: x  Gluc: x / Ketone: Trace  / Bili: Negative / Urobili: Negative   Blood: x / Protein: Negative / Nitrite: Negative   Leuk Esterase: Negative / RBC: x / WBC x   Sq Epi: x / Non Sq Epi: x / Bacteria: x        RADIOLOGY & ADDITIONAL TESTS:    Imaging Personally Reviewed:  [x ] YES  [ ] NO MICU DOWN GRADE NOTE    ----------------------------------------      Transfer from: MICU   Transfer to: ( X  ) Medicine      (  ) Telemetry         (   )  RCU     (   ) Palliative          (    ) Stroke Unit    Accepting Physician: Dr Delmar Camarillo  ----------------------------------------  HPI:    Sanna Allen is a 56F with hx of HTN, RA on leflunomide, anxiety who p/w fever, chills, cough, n/v, decreased po intake. Patient reports that she received an EGD two weeks ago and developed a sore throat shortly thereafter. Since then has had worsening neck pain. Around 9 days ago started developing fevers T max 107, chills, diaphoresis that did not resolve despite taking tylenol. Went to PCP who prescribed azithromycin and levofloxacin, completed course. however continued to have fevers, chills. Became nauseous a few days ago and started vomiting soon after she starts eating. NBNB, just food colored. Subsequent decreased pO intake, only able to take rice pudding. Reports that her neck and jaw continue to hurt. No dysphagia, no abdominal pain, no cough, chest pain. This morning reports that she took her pulse ox and found to have low oxygen sat mid80s% and came to the ED for further evaluation. Last steroids was in 2022, x2 weeks, completed.    Patient accepted to MICU for airway monitoring and protection. In the ICU, patient was weaned off pressors, remained hemodynamically stable. Zosyn was started for RADHA PNA, CT Neck found diffuse thyroiditis.    NEURO:  #Mental status: baseline AAO4  -Currently A&O x 4  - Continue to mon	or neurological status    CV:  #Septic shock, suspected viral syndrome complicated by PNA vs Aspiration (resolved)  - started on Levophed but able to titrate off while in ICU  - Continue Zosyn for aspiration PNA (already rcvd atypical coverage as outpatient)  - POCUS with B-lines on left; preserved RV/LV; LV>RV    # Tropinemia  - likely demand, downtrending    # hx of HTN  - monitor q4H  - pressors off since 10/13 1pm 10/13    PULM:  #Acute Hypoxic Respiratory Failure due to multilobar PNA  - Currently on HFNC 50%/40Lpm  - Wean down as tolerated    RENAL:  - no acute issues  - continue to monitor per renal function per ICU protocol    GI:  - diet regular  - hx of gerd  - c/w protonix 40 mg IV PPI    ENDO:  # radiographic evidence of thyroiditis  - check TFTs    METABOLIC:  # Electrolyte Abnormalities  K+ 2.7 on latest BMP; Phos 1.8 as well; likely in s/o recurrent vomiting   - Repleted with KCl and KPhos   - reassess on follow-up BMP    HEMATOLOGIC:    #Anemia: admit hgb 8.5; downtrended this AM to 6.9/7 on repeat  - Suspect dilutional given 2.5L IVF given on admit  - Type and Screen x 2 done  - Trend Hgb and transfusion for hgb < 7 or s/s active bleed  - Currently no s/s active bleeding    #DVT prophylaxis with lovenox    ID:  #sepsis 2/2 multilobar pneumonia  - Continue Zosyn for possible aspiration PNA (already rcvd atypical coverage as outpatient)    SKIN:  - no acute issues    To Follow UP  [ ] Trend Hgb  [ ] Follow up thyroid function labs; consider Endo consult for thyroiditis   [ ] Continue Abx for PNA  [ ] Follow-up with GI doc Dr. Jermaine Hill regarding outpt endoscopy reason and results        - Phone number is [phone]    REVIEW OF SYSTEMS:  CONSTITUTIONAL: +fever, chills, night sweats, =fatigue  EYES: No eye pain, visual disturbances, or discharge  ENMT:  No difficulty hearing, tinnitus, vertigo; No sinus or throat pain  NECK: +pain, no stiffness  RESPIRATORY: No cough, wheezing, or hemoptysis; +shortness of breath  CARDIOVASCULAR: No chest pain, palpitations, dizziness, or leg swelling  GASTROINTESTINAL: No abdominal or epigastric pain. +nausea, vomiting, no hematemesis; No diarrhea or constipation. No melena or hematochezia.  GENITOURINARY: No dysuria, frequency, hematuria, or incontinence  NEUROLOGICAL: No headaches, memory loss, loss of strength, numbness, or tremors  SKIN: No itching, burning, rashes, or lesions   LYMPH NODES: No enlarged glands  ENDOCRINE: No heat or cold intolerance; No hair loss  MUSCULOSKELETAL: No joint pain or swelling; No muscle, back, or extremity pain  PSYCHIATRIC: No depression, anxiety, mood swings, or difficulty sleeping  HEME/LYMPH: No easy bruising, or bleeding gums  ALLERGY AND IMMUNOLOGIC: No hives or eczema    MEDICATIONS:  chlorhexidine 4% Liquid 1 Application(s) Topical <User Schedule>  enoxaparin Injectable 40 milliGRAM(s) SubCutaneous every 24 hours  norepinephrine Infusion 0.05 MICROgram(s)/kG/Min IV Continuous <Continuous>  pantoprazole  Injectable 40 milliGRAM(s) IV Push daily  PARoxetine 20 milliGRAM(s) Oral daily  piperacillin/tazobactam IVPB.. 3.375 Gram(s) IV Intermittent once  potassium chloride   Powder 40 milliEquivalent(s) Oral once  potassium chloride  10 mEq/100 mL IVPB 10 milliEquivalent(s) IV Intermittent every 1 hour  potassium phosphate IVPB 30 milliMole(s) IV Intermittent once      T(C): 36.6 (10-13-22 @ 16:00), Max: 39.4 (10-13-22 @ 07:26)  HR: 92 (10-13-22 @ 16:50) (82 - 193)  BP: 109/57 (10-13-22 @ 16:00) (87/47 - 138/73)  RR: 25 (10-13-22 @ 16:50) (18 - 32)  SpO2: 94% (10-13-22 @ 16:50) (87% - 100%)  Wt(kg): --Vital Signs Last 24 Hrs  T(C): 36.6 (13 Oct 2022 16:00), Max: 39.4 (13 Oct 2022 07:26)  T(F): 97.8 (13 Oct 2022 16:00), Max: 103 (13 Oct 2022 07:26)  HR: 92 (13 Oct 2022 16:50) (82 - 193)  BP: 109/57 (13 Oct 2022 16:00) (87/47 - 138/73)  BP(mean): 78 (13 Oct 2022 16:00) (70 - 98)  RR: 25 (13 Oct 2022 16:50) (18 - 32)  SpO2: 94% (13 Oct 2022 16:50) (87% - 100%)    Parameters below as of 13 Oct 2022 16:50  Patient On (Oxygen Delivery Method): room air    PHYSICAL EXAM:  GENERAL: lying comfortable in bed, in no acute distress  HENMT: Atraumatic, dry mucous membranes, no oropharyngeal exudates or vesicles, uvula is midline, tender to palpation at b/l jaw and right neck, no masses or nodules, no edema, no fluctuance noted  EYES: Clear bilaterally, PERRL, EOMs intact b/l  HEART: RRR, S1/S2, no murmur/gallops/rubs  RESPIRATORY: bibasilar crackles L>R, no wheezes or rhonchi  ABDOMEN: +BS, soft, nontender, nondistended  EXTREMITIES: No lower extremity edema, +2 radial pulses b/l  NEURO:  A&Ox4, no focal motor deficits or sensory deficits   Heme/LYMPH: No ecchymosis or bruising, no anterior/posterior cervical or supraclavicular LAD  SKIN:  Skin normal color for race, warm, dry and intact. No evidence of rash    Consultant(s) Notes Reviewed:  [x ] YES  [ ] NO  Care Discussed with Consultants/Other Providers [ x] YES  [ ] NO    LABS:                        8.5    13.98 )-----------( 318      ( 13 Oct 2022 08:17 )             26.1     10-13    143  |  107  |  7   ----------------------------<  236<H>  2.7<LL>   |  27  |  0.54    Ca    9.0      13 Oct 2022 16:05  Phos  1.8     10-13  Mg     2.2     10-13    TPro  6.0  /  Alb  3.0<L>  /  TBili  0.4  /  DBili  x   /  AST  35  /  ALT  46<H>  /  AlkPhos  130<H>  10-13    PT/INR - ( 13 Oct 2022 08:17 )   PT: 17.6 sec;   INR: 1.51 ratio         PTT - ( 13 Oct 2022 08:17 )  PTT:27.6 sec  Urinalysis Basic - ( 13 Oct 2022 10:48 )    Color: Colorless / Appearance: Clear / S.006 / pH: x  Gluc: x / Ketone: Trace  / Bili: Negative / Urobili: Negative   Blood: x / Protein: Negative / Nitrite: Negative   Leuk Esterase: Negative / RBC: x / WBC x   Sq Epi: x / Non Sq Epi: x / Bacteria: x      CAPILLARY BLOOD GLUCOSE    Urinalysis Basic - ( 13 Oct 2022 10:48 )    Color: Colorless / Appearance: Clear / S.006 / pH: x  Gluc: x / Ketone: Trace  / Bili: Negative / Urobili: Negative   Blood: x / Protein: Negative / Nitrite: Negative   Leuk Esterase: Negative / RBC: x / WBC x   Sq Epi: x / Non Sq Epi: x / Bacteria: x        RADIOLOGY & ADDITIONAL TESTS:    Imaging Personally Reviewed:  [x ] YES  [ ] NO

## 2022-10-13 NOTE — ED PROVIDER NOTE - PHYSICAL EXAMINATION
General: pt diaphoretic, appears unwell  Head: Atraumatic, normocephalic  Eyes: EOM grossly in tact, no scleral icterus, no discharge  ENT: moist mucous membranes, floor of the mouth soft. No erythema or masses  Neurology: A&Ox 3, nonfocal, HERNANDEZ x 4  Respiratory: + crackles, B lines present on POCUS in L upper lung fields  CV: RRR, good s1/s2, no S3, Extremities warm and well perfused  Abdominal: Soft, non-distended, non-tender, no masses  Extremities: No edema, no deformities  Skin: diaphoretic and dry. No rashes    VS initially 88-92 on 2L NC, increased to 5 w/o sig relief. On NRB 95% General: pt diaphoretic, appears unwell  Head: Atraumatic, normocephalic  Eyes: EOM grossly in tact, no scleral icterus, no discharge  ENT: moist mucous membranes, floor of the mouth soft. No erythema or masses  Neurology: A&Ox 3, nonfocal, HERNANDEZ x 4  Respiratory: + crackles, B lines present on POCUS in L upper lung fields  CV: Tachycardic, Extremities warm and well perfused  Abdominal: Soft, non-distended, non-tender, no masses  Extremities: No edema, no deformities  Skin: diaphoretic and dry. No rashes    VS initially 88-92 on 2L NC, increased to 5 w/o sig relief. On NRB 95%

## 2022-10-13 NOTE — PATIENT PROFILE ADULT - FALL HARM RISK - HARM RISK INTERVENTIONS

## 2022-10-13 NOTE — ED PROVIDER NOTE - OBJECTIVE STATEMENT
57 yo F with HTN RA on leflunomide ( last steroid use august) presenting after an endoscopy on 9/30 with fever, chills, cough, N/V. She had severe pain in her neck/throat a few days after her procedure. She developed a fever and saw her PMD who prescribed ABX x2 w/o relief. Pt presents today with severe neck pain, fever TMax at home 107F and diaphoresis. She states she has been taking tylenol w/o relief. She has had a few episodes of vomiting. no abd pain, diarrhea, constipation, urinary sx. No chest pain or dyspnea.

## 2022-10-13 NOTE — ED ADULT NURSE NOTE - OBJECTIVE STATEMENT
0720 56 yr old  female brought in from triage in wheelchair for further eval and tx of fever, SOB and throat pain x a few days.Voice clear. No stridor or drooling.  s/p endoscopy 2 wks ago with throat pain and feeling sick since then. Tachypneic. Pale, Breath sounds decreased throughout with crackles at left base. States temp was 107.6 at home a few days ago. On Levaquin currently. A&Ox4. Fall risk precautions maintained. Airborne and droplet isolation maintained. O2 sats were 87-89% in triage per RN. 100% NRB in place. Dr Felipe and ER resident at [facility]. Bedside ultrasound being done. Cardiac monitor applied. IVL x2 inserted. Channing work drawn

## 2022-10-13 NOTE — CONSULT NOTE ADULT - SUBJECTIVE AND OBJECTIVE BOX
CC: neck pain, soar throat    HPI: Patient is a 56F with past medical hx of HTN, RA, and anxiety who p/w fever, chills, cough, n/v, and decreased po intake. Patient reports she had an EGD done two weeks ago and developed a sore throat and neck pain shortly after. ENT consulted for the sore throat and neck pain. Patient states she can only eat soft foods and drink liquids.  She rates her pain as an 8/10, that the pain is localized to the throat and anterior aspect of the neck, and that nothing she does makes it better or worse. Patient denies hoarseness, shortness of breath, cough.        PAST MEDICAL & SURGICAL HISTORY:  Hypertension      Rheumatoid arthritis        Allergies    No Known Allergies    Intolerances      MEDICATIONS  (STANDING):  chlorhexidine 4% Liquid 1 Application(s) Topical <User Schedule>  enoxaparin Injectable 40 milliGRAM(s) SubCutaneous every 24 hours  pantoprazole  Injectable 40 milliGRAM(s) IV Push daily  PARoxetine 20 milliGRAM(s) Oral daily  piperacillin/tazobactam IVPB.. 3.375 Gram(s) IV Intermittent once  potassium chloride  10 mEq/100 mL IVPB 10 milliEquivalent(s) IV Intermittent every 1 hour    MEDICATIONS  (PRN):      Social History: No tobacco or substance use    Family history: No significant family history    ROS:   ENT: all negative except as noted in HPI   CV: denies palpitations  Pulm: +cough, denies SOB, hemoptysis  GI: denies change in apetite, indigestion, n/v  : denies pertinent urinary symptoms, urgency  Neuro: denies numbness/tingling, loss of sensation  Psych: denies anxiety  MS: denies muscle weakness, instability  Heme: denies easy bruising or bleeding  Endo: denies heat/cold intolerance, excessive sweating  Vascular: denies LE edema    Vital Signs Last 24 Hrs  T(C): 36.6 (13 Oct 2022 16:00), Max: 39.4 (13 Oct 2022 07:26)  T(F): 97.8 (13 Oct 2022 16:00), Max: 103 (13 Oct 2022 07:26)  HR: 95 (13 Oct 2022 19:00) (82 - 193)  BP: 112/55 (13 Oct 2022 19:00) (87/47 - 138/73)  BP(mean): 77 (13 Oct 2022 19:00) (70 - 98)  RR: 33 (13 Oct 2022 19:00) (18 - 33)  SpO2: 94% (13 Oct 2022 19:00) (87% - 100%)    Parameters below as of 13 Oct 2022 16:50  Patient On (Oxygen Delivery Method): room air                       8.5    13.98 )-----------( 318      ( 13 Oct 2022 08:17 )             26.1    10-13    143  |  107  |  7   ----------------------------<  236<H>  2.7<LL>   |  27  |  0.54    Ca    9.0      13 Oct 2022 16:05  Phos  1.8     10-13  Mg     2.2     10-13    TPro  6.0  /  Alb  3.0<L>  /  TBili  0.4  /  DBili  x   /  AST  35  /  ALT  46<H>  /  AlkPhos  130<H>  10-13   PT/INR - ( 13 Oct 2022 08:17 )   PT: 17.6 sec;   INR: 1.51 ratio         PTT - ( 13 Oct 2022 08:17 )  PTT:27.6 sec    PHYSICAL EXAM:  Gen: NAD  Skin: No rashes, bruises, or lesions  Head: Normocephalic, Atraumatic  Face: no edema, erythema, or fluctuance. Parotid glands soft without mass  Eyes: no scleral injection  Nose: Nares bilaterally patent, no discharge  Mouth: No Stridor / Drooling / Trismus.  Mucosa moist, tongue/uvula midline, oropharynx clear  Neck: anterior neck tender to palpation, Flat, supple, no lymphadenopathy, trachea midline, no masses  Lymphatic: No lymphadenopathy  Resp: breathing easily, no stridor  CV: no peripheral edema/cyanosis  GI: nondistended   Peripheral vascular: no JVD or edema  Neuro: facial nerve intact, no facial droop      Fiberoptic Indirect Laryngoscopy (scope #3 used): sore throat, neck pain    Reason for Laryngoscopy:    Patient was unable to cooperate with mirror.  Nasopharynx, oropharynx, and hypopharynx clear, no bleeding. Tongue base, posterior pharyngeal wall, vallecula, epiglottis, and subglottis appear normal. No erythema, edema, pooling of secretions, masses or lesions. Airway patent, no foreign body visualized. No glottic/supraglottic edema. True vocal cords, arytenoids, vestibular folds, ventricles, pyriform sinuses, and aryepiglottic folds appear normal bilaterally. Vocal cords mobile with good contact b/l.      IMAGING/ADDITIONAL STUDIES:   < from: CT Neck Soft Tissue w/ IV Cont (10.13.22 @ 11:34) >  IMPRESSION:    Diffuse enlargement of the thyroid with fat surrounding fat stranding,   compatible with thyroiditis. Mild retropharyngeal edema. No enhancing   collection to suggest abscess.    --- End of Report ---    < end of copied text >

## 2022-10-13 NOTE — H&P ADULT - HISTORY OF PRESENT ILLNESS
Sanna Allen is a 56F with hx of HTN, RA on leflunomide, anxiety who p/w fever, chills, cough, n/v, decreased po intake. Patient reports that she received an EGD two weeks ago and developed a sore throat shortly thereafter. Since then has had worsening neck pain. Around 9 days ago started developing  Sanna Allen is a 56F with hx of HTN, RA on leflunomide, anxiety who p/w fever, chills, cough, n/v, decreased po intake. Patient reports that she received an EGD two weeks ago and developed a sore throat shortly thereafter. Since then has had worsening neck pain. Around 9 days ago started developing fevers T max 107, chills, diaphoresis that did not resolve despite taking tylenol.  Sanna Allen is a 56F with hx of HTN, RA on leflunomide, anxiety who p/w fever, chills, cough, n/v, decreased po intake. Patient reports that she received an EGD two weeks ago and developed a sore throat shortly thereafter. Since then has had worsening neck pain. Around 9 days ago started developing fevers T max 107, chills, diaphoresis that did not resolve despite taking tylenol. Went to PCP who prescribed azithromycin and levofloxacin, completed course. however continued to have fevers, chills. Became nauseous a few days ago and started vomiting soon after she starts eating. NBNB, just food colored. Subsequent decreased pO intake, only able to take rice pudding. Reports that her neck and jaw continue to hurt. No dysphagia, no abdominal pain, no cough, chest pain. This morning reports that she took her pulse ox and found to have low oxygen sat mid80s%.    Last steroids was in August 2022, x2 weeks, completed. Sanna Allen is a 56F with hx of HTN, RA on leflunomide, anxiety who p/w fever, chills, cough, n/v, decreased po intake. Patient reports that she received an EGD two weeks ago and developed a sore throat shortly thereafter. Since then has had worsening neck pain. Around 9 days ago started developing fevers T max 107, chills, diaphoresis that did not resolve despite taking tylenol. Went to PCP who prescribed azithromycin and levofloxacin, completed course. however continued to have fevers, chills. Became nauseous a few days ago and started vomiting soon after she starts eating. NBNB, just food colored. Subsequent decreased pO intake, only able to take rice pudding. Reports that her neck and jaw continue to hurt. No dysphagia, no abdominal pain, no cough, chest pain. This morning reports that she took her pulse ox and found to have low oxygen sat mid80s% and came to the ED for further evaluation.    Last steroids was in August 2022, x2 weeks, completed. Sanna Allen is a 56F with hx of HTN, RA on leflunomide, anxiety who p/w fever, chills, cough, n/v, decreased po intake. Patient reports that she received an EGD two weeks ago and developed a sore throat shortly thereafter. Since then has had worsening neck pain. Around 9 days ago started developing fevers T max 107, chills, diaphoresis that did not resolve despite taking tylenol. Went to PCP who prescribed azithromycin and levofloxacin, completed course. however continued to have fevers, chills. Became nauseous a few days ago and started vomiting soon after she starts eating. NBNB, just food colored. Subsequent decreased pO intake, only able to take rice pudding. Reports that her neck and jaw continue to hurt. No dysphagia, no abdominal pain, no cough, chest pain. This morning reports that she took her pulse ox and found to have low oxygen sat mid80s% and came to the ED for further evaluation.    Last steroids was in August 2022, x2 weeks, completed. In the ED, temp 101.5, , 87/56, RR 20, 87% RA. Started on HFNC, given 2.6 L LR, vanc/zosyn, ofirmev, solucortef 100 mg

## 2022-10-13 NOTE — H&P ADULT - ASSESSMENT
Ms. Sanna Allen is a 57 Y/O F w/ PMH HTN, RA on Leflunomide  56F with hx of HTN, RA on leflunomide, anxiety who p/w fever, chills, cough, n/v, decreased po intake after recent EGD,     NEURO:  #Mental status: baseline AAO4  -Currently A&O x 3    CV:  #STEMI (+trops, +EKG changes) | NSTEMI (+trops) | unstable angina (-trops): Typical/atypical symptoms, loaded with 325 mg ASA, 180mg Ticagrelor/ 600mg Clopidogrel, sublingual nitroglycerin 0.4mg, heparin bolus (check monogram)  - Revascularization (within 48hrs of symptoms) with PCI  - IJEOMA score: (if >140, revascularize early)  - Nitroglycerin gtt 5mcg/min UNLESS anterior MI for pain relief  - DAPT: ASA 81, Clopidogrel 75mg qD /Ticagrelor 90mg BID for 1 year  - Statin: atorvastatin 80mg qD  - Heparin gtt (for 48 hrs or until revascularization with PCI, check monogram)    s/p stent: stop heparin, nitro gtt  -c/w DAPT  - start B-blocker: metoprolol tartrate  - start ACE-I (wait until AM labs return)   - statin: atorvastatin 80mg qD  - check ECHO in 48hr if anterior wall MI      #Arrythmia:   - Monitor on tele    #AFib:   - CHADSVASC score:  - HAS-BLED score:  - a/c: DOAC (apixiban 5mg BID (Eliquis)| dabigatran 150mg BID (Pradexa)| Riveroxaban 20mg qD (Xeralto) > Coumadin  - If new: get echo (check for tachycardia, valvular AF, L atrial size which is large if chronic)  - Rate control: BB/Digoxin/Amio if low EF  - Monitor telemetry    #s/p Atrial fibrillation Ablation:  - CHADSVASC/HAS-BLED score:  - a/c: Coumadin, DOAC (eliquis/pradexa/xeralto) resume after 6hrs post procedure  - Bedrest x6hrs, resume head of bed @30 degrees afterwards  - 12-lead EKG   - Continuous telemetry monitoring for arrythmias  - TTE echo (to r/o tamponade and heart fx)  - Protonix 40mg qD (prophylactic prevent acidity b/c heat from ablation since L atrium is close to esophagusx 1mo)   - DASH diet: Soft  - Pro-BNP labs in AM (to determine underlying dilation of heart)    #s/p Atrial flutter Ablation:  - CHADSVASC/HAS-BLED score:  - a/c: Coumadin, DOAC (eliquis/pradexa/xeralto)  - Rate control: CCB or BB  - Continuous telemetry monitoring for arrythmias  - Bedrest x6hrs, resume head of bed @30 degrees afterwards  - 12-lead EKG now    #Hemodynamically stable/unstable:  - On pressors due to ____ shock (cardiogenic due to muscle or valve failure, obstructive due to peff, PE, PTX, hypovolemic, vasopegic)     PULM:  #COPD: *Home meds* on __L O2    RENAL:  #ERICKA:   -UO:  -Cole:    GI:  #Transaminitis: Elevated LFTs  #Diet:  #Bowel regiment:    ENDO:  #DM2: HbA1c ***   - Insulin Sliding Scale    METABOLIC:  #Electrolyte abnormalities    HEMATOLOGIC:  #CBC results show  #Coag panel shows  #DVT prophylaxis with     ID:  #Pt without strong objective or clinical evidence of infection. Will observe off antibiotics    SKIN:  #Lines:  #Decubitus ulcers:       56F with hx of HTN, RA on leflunomide, anxiety who p/w fever, chills, cough, n/v, decreased po intake after recent EGD,     NEURO:  #Mental status: baseline AAO4  -Currently A&O x 3    CV:  # shock    # Tropinemia    # hx of HTN    PULM:  #acute hypoxic respiratory failure    RENAL:  - no acute issues  - continue to monitor per renal function per ICU protocol    GI:  - diet regular  - hx of gerd  - c/w protonix 40 mg IV PPI    ENDO:  # radiographic evidence of thyroiditis    METABOLIC:  #Electrolyte abnormalities  - likely iso decreased PO intake    HEMATOLOGIC:  #DVT prophylaxis with lovenox    ID:  #sepsis 2/2 multilobar pneumonia    SKIN:  - no acute issues     56F with hx of HTN, RA on leflunomide, anxiety who p/w fever, chills, cough, n/v, decreased po intake after recent EGD.    NEURO:  #Mental status: baseline AAO4  -Currently A&O x 3    CV:  # shock, resolved  -     # Tropinemia    # hx of HTN    PULM:  #acute hypoxic respiratory failure    RENAL:  - no acute issues  - continue to monitor per renal function per ICU protocol    GI:  - diet regular  - hx of gerd  - c/w protonix 40 mg IV PPI    ENDO:  # radiographic evidence of thyroiditis    METABOLIC:  #Electrolyte abnormalities  - likely iso decreased PO intake    HEMATOLOGIC:  #DVT prophylaxis with lovenox    ID:  #sepsis 2/2 multilobar pneumonia    SKIN:  - no acute issues     56F with hx of HTN, RA on leflunomide, anxiety who p/w fever, chills, cough, n/v, decreased po intake after recent EGD.    NEURO:  #Mental status: baseline AAO4  - Currently A&O x 3  - neuro checks per ICU protocol    # anxiety  - c/w home paxil    CV:  # shock, resolved  - likely multifactorial, septic vs hypovolemic iso fevers and 9 days of decreased PO intake, nausea, vomiting  - s/p 2.6L in ED, subsequent POCUS with 2.1 mm IVC in the ED, started on levo, weaned per ICU protocol  - vitals per ICU protocol    # Tropinemia  - trops on admit 229, likely iso demand ischemia  - recheck trops    # hx of HTN  - holding home antihypertensives iso shock    PULM:  #acute hypoxic respiratory failure  - CT chest with multilobar pneumonia, possible aspiration pneumonia iso emesis  - zosyn (10/13- )  - c/w hi flow nasal cannula, wean per ICU protocol  - fungitell  - blood cultures, urine cultures    RENAL:  - no acute issues  - continue to monitor per renal function per ICU protocol    GI:  - diet regular  - hx of gerd  - c/w protonix 40 mg IV PPI    ENDO:  # radiographic evidence of thyroiditis  - CT neck (10/13): diffuse enlargement of thyroid with fat surrounding fat stranding, c/f thyroiditis, mild retropharyngeal edema  - thyroid function tests  - ent consult for retropharyngeal edema    METABOLIC:  #Electrolyte abnormalities  - likely iso decreased PO intake, emesis  - replete prn    HEMATOLOGIC:  #DVT prophylaxis with lovenox    ID:  #sepsis 2/2 multilobar pneumonia  - bcx x2, urine culture, RVP, fungitell  - zosyn (10/13 - )    SKIN:  - no acute issues

## 2022-10-13 NOTE — PATIENT PROFILE ADULT - DO YOU LACK THE NECESSARY SUPPORT TO HELP YOU COPE WITH LIFE CHALLENGES?
HPI:    Patient is a 79 y.o. male in no acute distress. He is alert and oriented to person, place, and time. History  4/2020 Referral from Dr. Tory Aaron in the ER for urinary retention.  He is here with his daughter. Vanda Vasquez does have dementia. Vanda Vasquez is a resident at [facility] long-term care facility. Vanda Vasquez was evaluated in the ER on 4/20/2020 due to abdominal pain. Vanda Vasquez was found to be in urinary retention. Vanda Vasquez was straight cathed for 900 mL of clear urine.  He then returned to the ER again that afternoon with altered mental status and fall.  A Monique catheter was placed at that time for 1000 mL.  He did wear a brief prior to Monique catheter placement. Tj Kelley is unclear how often he was incontinent. Vanda Vasquez has never been on medication for lower urinary tract symptoms. Vanda Vasquez does admit to constipation.  His daughter reports when he was living at home he did not have lower urinary tract symptoms to her knowledge, but he did regularly take stool softeners. Sathya Wick is no history of gross hematuria or kidney stones. Vanda Vasquez has never seen urology in the past. Vanda Vasquez has been on Flomax since 4/11/2020. Vanda Vasquez does have history of chronic kidney disease stage III.  Renal function from 4/20/2020 reviewed: BUN 38, creatinine 1.58, GFR 82.       Currently  Pt is here today for 3 month f/u. Patient is doing well. He denies any current lower urinary tract symptoms. He has had no recent gross hematuria or dysuria. He is voiding approximately every 3 hours during the day. He is having stomach pain. He does have 2 hernias. We did also start him on finasteride. We also did place him on fosfomycin. Patient did get recent lab work done. His creatinine is stable at 1.71. Patient does have 2 large ventral hernias. He has had these analyzed by a surgeon in [facility] [employer]. They decided not to do surgery due to patient's age. He is having occasional abdominal pain. He is unsure where this is located.     Past Medical History:   Diagnosis Date    Back pain  Blind right eye     left eye poor vision also    Constipation     COPD (chronic obstructive pulmonary disease) (HCC)     Diabetes mellitus (HCC)     Diverticulosis     GERD (gastroesophageal reflux disease)     Glaucoma     Hernia, ventral     Hyperlipidemia     Hypothyroidism (acquired)     Iron deficiency anemia     Kidney disease, chronic, stage III (moderate, EGFR 30-59 ml/min) (HCC)     Osteomyelitis of lumbar vertebra (HCC)     Restless leg syndrome      Past Surgical History:   Procedure Laterality Date   Geraldine Spatz Dr. Arlina Longs  2004    Dr. Siria Simmons    ERCP  08/02/2017    balloon sweep,    ERCP N/A 8/2/2017    ERCP ENDOSCOPIC RETROGRADE CHOLANGIOPANCREATOGRAPHY WITH BALLOON SWEEP AND CHOLANGIOGRAM, GI UNIT, C-ARM  performed by Martinez Shoemakre MD at [address]      blood in the eyeball    OTHER SURGICAL HISTORY  05-20-16    Laparoscopic selena. converted to open selena.  TONSILLECTOMY       Outpatient Encounter Medications as of 9/10/2020   Medication Sig Dispense Refill    ferrous sulfate (FE TABS 325) 325 (65 Fe) MG EC tablet Take 325 mg by mouth 2 times daily      albuterol (PROVENTIL) (2.5 MG/3ML) 0.083% nebulizer solution Take 2.5 mg by nebulization every 4 hours as needed for Shortness of Breath      ondansetron (ZOFRAN-ODT) 4 MG disintegrating tablet Take 4 mg by mouth every 6 hours as needed for Nausea or Vomiting      aspirin 81 MG chewable tablet Take 1 tablet by mouth daily 30 tablet 3    Carboxymethylcellulose Sodium (ARTIFICIAL TEARS OP) Apply 1 drop to eye 2 times daily Both eyes      atorvastatin (LIPITOR) 20 MG tablet Take 20 mg by mouth nightly      docusate sodium (COLACE) 100 MG capsule Take 100 mg by mouth 2 times daily      gabapentin (NEURONTIN) 300 MG capsule Take 300 mg by mouth 3 times daily.       Magnesium 400 MG TABS Take 1 tablet by mouth daily      melatonin 3 MG TABS tablet Take 3 mg by mouth nightly      tamsulosin (FLOMAX) 0.4 MG capsule Take 0.4 mg by mouth every evening       acetaminophen (TYLENOL) 500 MG tablet Take 1,000 mg by mouth every 8 hours       rOPINIRole (REQUIP) 0.5 MG tablet Take 1 tablet by mouth 2 times daily 180 tablet 1    montelukast (SINGULAIR) 10 MG tablet take 1 tablet by mouth once daily  0     No facility-administered encounter medications on file as of 9/10/2020. Current Outpatient Medications on File Prior to Visit   Medication Sig Dispense Refill    ferrous sulfate (FE TABS 325) 325 (65 Fe) MG EC tablet Take 325 mg by mouth 2 times daily      albuterol (PROVENTIL) (2.5 MG/3ML) 0.083% nebulizer solution Take 2.5 mg by nebulization every 4 hours as needed for Shortness of Breath      ondansetron (ZOFRAN-ODT) 4 MG disintegrating tablet Take 4 mg by mouth every 6 hours as needed for Nausea or Vomiting      aspirin 81 MG chewable tablet Take 1 tablet by mouth daily 30 tablet 3    Carboxymethylcellulose Sodium (ARTIFICIAL TEARS OP) Apply 1 drop to eye 2 times daily Both eyes      atorvastatin (LIPITOR) 20 MG tablet Take 20 mg by mouth nightly      docusate sodium (COLACE) 100 MG capsule Take 100 mg by mouth 2 times daily      gabapentin (NEURONTIN) 300 MG capsule Take 300 mg by mouth 3 times daily.  Magnesium 400 MG TABS Take 1 tablet by mouth daily      melatonin 3 MG TABS tablet Take 3 mg by mouth nightly      tamsulosin (FLOMAX) 0.4 MG capsule Take 0.4 mg by mouth every evening       acetaminophen (TYLENOL) 500 MG tablet Take 1,000 mg by mouth every 8 hours       rOPINIRole (REQUIP) 0.5 MG tablet Take 1 tablet by mouth 2 times daily 180 tablet 1    montelukast (SINGULAIR) 10 MG tablet take 1 tablet by mouth once daily  0     No current facility-administered medications on file prior to visit.       Aspirin; Codeine; and Penicillins  Family History   Problem Relation Age of Onset    Cancer Mother colon    Cancer Sister         colon    Cancer Other         colon    Cancer Sister         colon     Social History     Tobacco Use   Smoking Status Never Smoker   Smokeless Tobacco Never Used       Social History     Substance and Sexual Activity   Alcohol Use No       Review of Systems   Constitutional: Negative for appetite change, chills and fever. Eyes: Negative for pain, redness and visual disturbance. Respiratory: Negative for cough, shortness of breath and wheezing. Cardiovascular: Negative for chest pain and leg swelling. Gastrointestinal: Negative for abdominal pain, nausea and vomiting. Genitourinary: Negative for difficulty urinating, discharge, dysuria, flank pain, frequency, hematuria, scrotal swelling and testicular pain. Musculoskeletal: Negative for back pain, joint swelling and myalgias. Skin: Negative for color change, rash and wound. Neurological: Negative for dizziness, tremors and numbness. Hematological: Negative for adenopathy. Does not bruise/bleed easily. Temp 97.6 °F (36.4 °C) (Temporal)   Ht 5' 5\" (1.651 m)   BMI 25.29 kg/m²       PHYSICAL EXAM:  Constitutional: Patient in no acute distress; Neuro: alert and oriented to person place and time. Psych: Mood and affect normal.  Skin: Normal  Lungs: Respiratory effort normal  Cardiovascular:  Normal peripheral pulses  Abdomen: Soft, non-tender, non-distended with no CVA, flank pain, hepatosplenomegaly or hernia. Kidneys normal.  Bladder non-tender and not distended.   Lymphatics: no palpable lymphadenopathy  Penis normal  Urethral meatus normal  Scrotal exam normal  Testicles normal bilaterally  Epididymis normal bilaterally  No evidence of inguinal hernia      Lab Results   Component Value Date    BUN 37 (H) 05/05/2020     Lab Results   Component Value Date    CREATININE 1.65 (H) 05/05/2020     Lab Results   Component Value Date    PSA 0.23 08/26/2019    PSA 0.22 04/30/2019    PSA 0.22 04/29/2019 ASSESSMENT:  This is a 79 y.o. male with the following diagnoses:   Diagnosis Orders   1. Urinary retention           PLAN:  Patient will continue on Flomax and finasteride. He will follow-up with us in 3 months with a repeat BMP. no

## 2022-10-13 NOTE — ED PROVIDER NOTE - PROGRESS NOTE DETAILS
Deirdre Lin, PGY-2, EM: called MICU consult phone. They will evaluate the patient Deirdre Lin, PGY-2, EM: Pt reassessed. BP 92/57 MAP 61. Pt appears more comfortable. Still tachypneic, satting 100%. HR improved from 120s to 105 with 1.5 L LR. will continue to reassess. pt bp now 90's hr dec from 130 to 105  - still requiring nrb o2 supplementation and rr elevated pocus with l side blines no pl effusion and flat ivc -- concern for pna - empiric abx and 2l lr c/w 30/kg infusion - , ivc w/o thrombosis , cervical ln noted no collection -- complex left thyroid nodule/cyst noted -- micu to eval will place pt on high flow o2 - for tachypnea - bp back down in 80's 3rd l started ivc reeval still variation < 2 cm - may need to switch to levophed shortly pt received 800 cc of 3rd liter - ivc now plethoric > 2 cm will start levophed - micu in  to evaluate Deirdre Lin, PGY-2, EM: Pt accepted to MICU. Will get CT on way upstairs.

## 2022-10-13 NOTE — ED PROVIDER NOTE - CLINICAL SUMMARY MEDICAL DECISION MAKING FREE TEXT BOX
55 yo F with HTN and RA presenting after an endoscopy on 9/30 with fever, chills, cough, N/V. Differential diagnosis includes but is not limited to pneumonia, neck mass, Boerhaave's, empyema, mediastinitis, viral infection, neck abscess vs. retropharyngeal mass. Pt unwell appearing. Fluids started. POCUS showing hyperdynamic HR. will order sepsis bundle including cultures. will tx empirically with ABX. ofirmev ordered. Given recent hx of endoscopy would get CT imaging. 55 yo F with HTN and RA presenting after an endoscopy on 9/30 with fever, chills, cough, N/V. Differential diagnosis includes but is not limited to pneumonia, neck mass, Boerhaave's, empyema, mediastinitis, viral infection, neck abscess vs. retropharyngeal mass. Pt unwell appearing. Fluids started. POCUS showing hyperdynamic HR. will order sepsis bundle including cultures. will tx empirically with ABX. ofirmev ordered. Given recent hx of endoscopy would get CT imaging.  schuyler 56 f with ra not currently on steroids with 10+ days of high feer , chills , rigors , productive cough all after endoscopy no abd pain no sq emphesema on exam in chest or neck - op neg -no submental fullness - concern for pna and septic shock as pt hyupoxic 79 ra with hypotension and tachycardia w fever 103 -- pt post endoscopy with r/o esoph perf and mediastinitis - will ct -

## 2022-10-13 NOTE — H&P ADULT - NSHPREVIEWOFSYSTEMS_GEN_ALL_CORE
REVIEW OF SYSTEMS:  CONSTITUTIONAL: No fever, chills, night sweats, or fatigue  EYES: No eye pain, visual disturbances, or discharge  ENMT:  No difficulty hearing, tinnitus, vertigo; No sinus or throat pain  NECK: No pain or stiffness  RESPIRATORY: No cough, wheezing, or hemoptysis; No shortness of breath  CARDIOVASCULAR: No chest pain, palpitations, dizziness, or leg swelling  GASTROINTESTINAL: No abdominal or epigastric pain. No nausea, vomiting, or hematemesis; No diarrhea or constipation. No melena or hematochezia.  GENITOURINARY: No dysuria, frequency, hematuria, or incontinence  NEUROLOGICAL: No headaches, memory loss, loss of strength, numbness, or tremors  SKIN: No itching, burning, rashes, or lesions   LYMPH NODES: No enlarged glands  ENDOCRINE: No heat or cold intolerance; No hair loss  MUSCULOSKELETAL: No joint pain or swelling; No muscle, back, or extremity pain  PSYCHIATRIC: No depression, anxiety, mood swings, or difficulty sleeping  HEME/LYMPH: No easy bruising, or bleeding gums  ALLERGY AND IMMUNOLOGIC: No hives or eczema REVIEW OF SYSTEMS:  CONSTITUTIONAL: +fever, chills, night sweats, =fatigue  EYES: No eye pain, visual disturbances, or discharge  ENMT:  No difficulty hearing, tinnitus, vertigo; No sinus or throat pain  NECK: +pain, no stiffness  RESPIRATORY: No cough, wheezing, or hemoptysis; +shortness of breath  CARDIOVASCULAR: No chest pain, palpitations, dizziness, or leg swelling  GASTROINTESTINAL: No abdominal or epigastric pain. +nausea, vomiting, no hematemesis; No diarrhea or constipation. No melena or hematochezia.  GENITOURINARY: No dysuria, frequency, hematuria, or incontinence  NEUROLOGICAL: No headaches, memory loss, loss of strength, numbness, or tremors  SKIN: No itching, burning, rashes, or lesions   LYMPH NODES: No enlarged glands  ENDOCRINE: No heat or cold intolerance; No hair loss  MUSCULOSKELETAL: No joint pain or swelling; No muscle, back, or extremity pain  PSYCHIATRIC: No depression, anxiety, mood swings, or difficulty sleeping  HEME/LYMPH: No easy bruising, or bleeding gums  ALLERGY AND IMMUNOLOGIC: No hives or eczema

## 2022-10-13 NOTE — H&P ADULT - NSHPLABSRESULTS_GEN_ALL_CORE
LABS:                           8.5    13.98 )-----------( 318      ( 13 Oct 2022 08:17 )             26.1     10-13    136  |  96  |  12  ----------------------------<  133<H>  2.9<LL>   |  26  |  0.82    Ca    9.4      13 Oct 2022 08:17    TPro  6.8  /  Alb  3.3  /  TBili  0.7  /  DBili  x   /  AST  33  /  ALT  49<H>  /  AlkPhos  147<H>  10-13        PT/INR - ( 13 Oct 2022 08:17 )   PT: 17.6 sec;   INR: 1.51 ratio         PTT - ( 13 Oct 2022 08:17 )  PTT:27.6 sec          LIVER FUNCTIONS - ( 13 Oct 2022 08:17 )  Alb: 3.3 g/dL / Pro: 6.8 g/dL / ALK PHOS: 147 U/L / ALT: 49 U/L / AST: 33 U/L / GGT: x             Blood Gas Profile w/Lytes - Venous: Performed In Lab (10-13-22 @ 09:51)  Blood Gas Profile w/Lytes - Venous: Performed In Lab (10-13-22 @ 07:25)    Blood Gas Profile w/Lytes - Venous: Performed In Lab (10-13-22 @ 09:51)  Blood Gas Profile w/Lytes - Venous: Performed In Lab (10-13-22 @ 07:25)    I&O's Summary         /     CAPILLARY BLOOD GLUCOSE                MICRO: LABS:                           8.5    13.98 )-----------( 318      ( 13 Oct 2022 08:17 )             26.1     10-13    136  |  96  |  12  ----------------------------<  133<H>  2.9<LL>   |  26  |  0.82    Ca    9.4      13 Oct 2022 08:17    TPro  6.8  /  Alb  3.3  /  TBili  0.7  /  DBili  x   /  AST  33  /  ALT  49<H>  /  AlkPhos  147<H>  10-13        PT/INR - ( 13 Oct 2022 08:17 )   PT: 17.6 sec;   INR: 1.51 ratio         PTT - ( 13 Oct 2022 08:17 )  PTT:27.6 sec          LIVER FUNCTIONS - ( 13 Oct 2022 08:17 )  Alb: 3.3 g/dL / Pro: 6.8 g/dL / ALK PHOS: 147 U/L / ALT: 49 U/L / AST: 33 U/L / GGT: x             Blood Gas Profile w/Lytes - Venous: Performed In Lab (10-13-22 @ 09:51)  Blood Gas Profile w/Lytes - Venous: Performed In Lab (10-13-22 @ 07:25)    Blood Gas Profile w/Lytes - Venous: Performed In Lab (10-13-22 @ 09:51)  Blood Gas Profile w/Lytes - Venous: Performed In Lab (10-13-22 @ 07:25)    I&O's Summary         /     CAPILLARY BLOOD GLUCOSE                MICRO:    < from: CT Neck Soft Tissue w/ IV Cont (10.13.22 @ 11:34) >    IMPRESSION:    Diffuse enlargement of the thyroid with fat surrounding fat stranding,   compatible with thyroiditis. Mild retropharyngeal edema. No enhancing   collection to suggest abscess.    < end of copied text >    < from: CT Angio Chest PE Protocol w/ IV Cont (10.13.22 @ 11:33) >    IMPRESSION:  No pulmonary embolism.    Mild interlobular septal thickening with patchy and groundglass opacities   in the left upper lobe likely representing mild edema. There may be a   concurrent pneumonia in the left upper and lower lobes. 8 week follow-up   is recommended to assess for improvement.    < end of copied text >

## 2022-10-13 NOTE — ED ADULT NURSE NOTE - NSIMPLEMENTINTERV_GEN_ALL_ED
Implemented All Fall Risk Interventions:  [place] to call system. Call bell, personal items and telephone within reach. Instruct patient to call for assistance. Room bathroom lighting operational. Non-slip footwear when patient is off stretcher. Physically safe environment: no spills, clutter or unnecessary equipment. Stretcher in lowest position, wheels locked, appropriate side rails in place. Provide visual cue, wrist band, yellow gown, etc. Monitor gait and stability. Monitor for mental status changes and reorient to person, place, and time. Review medications for side effects contributing to fall risk. Reinforce activity limits and safety measures with patient and family.

## 2022-10-13 NOTE — H&P ADULT - ATTENDING COMMENTS
Ms. Allen is a 57 yo F with PMHx HTN, RA (on Leflunomide for >1yr), GERD (uncontrolled), anxiety who presents with complaint of 2 weeks of sore throat, N/V (NB/NB), fevers (Tmax per patient 107, here 101.2) hypotensive on presentation s/p 2.6L IVFs ultimately req Levophed and hypoxemic resp failure on HFNC.  She had recent EGD/Colonscopy as outpatient just prior to symptom onset and was treated as outpatient with course of Azithromycin and Levaquin without improvement.  Admitted to MICU with septic shock and hypoxic resp failure.  CTA negative for PE.    #Septic shock, suspected viral syndrome complicated by PNA vs Aspiration (resolved)  - started on Levophed but able to titrate off while in ICU  - Continue Zosyn for aspiration PNA (already rcvd atypical coverage as outpatient)  - POCUS with B-lines on left; preserved RV/LV; LV>RV  #Hypoxic respiratory failure d/t multilobar PNA  - req HFNC 50%/40Lpm  #Sore throat/Retropharyngeal edema  - limited oral exam but no e/o tonsillar exudates  - CT neck with thyroiditis/retropharyngeal edema  - latter may be due to N/V induced inflammation  - can have ENT eval for laryngoscopy  #Thyroiditis  - check TFTs  #GERD  - will contact outpatient GI for findings, checked for H. pylori unclear if biopsies performed  - start PPI  #PPx: Lovenox SQ    Sil Palacios MD Ms. Allen is a 55 yo F with PMHx HTN, RA (on Leflunomide for >1yr), GERD (uncontrolled), anxiety who presents with complaint of 2 weeks of sore throat, N/V (NB/NB), fevers (Tmax per patient 107, here 101.2) hypotensive on presentation s/p 2.6L IVFs ultimately req Levophed and hypoxemic resp failure on HFNC.  She had recent EGD/Colonscopy as outpatient just prior to symptom onset and was treated as outpatient with course of Azithromycin and Levaquin without improvement.  Admitted to MICU with septic shock and hypoxic resp failure.  CTA negative for PE.    #Septic shock, suspected viral syndrome complicated by PNA vs Aspiration (resolved)  - started on Levophed but able to titrate off while in ICU  - Continue Zosyn for aspiration PNA (already rcvd atypical coverage as outpatient)  - POCUS with B-lines on left; preserved RV/LV; LV>RV  #Hypoxic respiratory failure d/t multilobar PNA  - req HFNC 50%/40Lpm  #Sore throat/Retropharyngeal edema  - limited oral exam but no e/o tonsillar exudates  - CT neck with thyroiditis/retropharyngeal edema  - latter may be due to N/V induced inflammation  - can have ENT eval for laryngoscopy  #Thyroiditis  - check TFTs  #GERD  - will contact outpatient GI for findings, checked for H. pylori unclear if biopsies performed  - start PPI  #Anemia  - unk baseline, no history of bleeding, monitor H/H  - send iron studies, b12, folate  #PPx: Lovenox SQ    Sil Palacios MD

## 2022-10-13 NOTE — H&P ADULT - NSHPPHYSICALEXAM_GEN_ALL_CORE
VITALS:   T(C): 36.7 (10-13-22 @ 08:45), Max: 39.4 (10-13-22 @ 07:26)  HR: 92 (10-13-22 @ 10:05) (92 - 126)  BP: 88/54 (10-13-22 @ 08:45) (87/47 - 101/58)  RR: 22 (10-13-22 @ 10:05) (20 - 30)  SpO2: 95% (10-13-22 @ 10:05) (87% - 100%)    PHYSICAL EXAM:     GENERAL: NAD, lying in bed comfortably.  HEAD:  Atraumatic, normocephalic.  EYES: EOMI, PERRLA, conjunctiva and sclera clear.  ENT: Moist mucous membranes.  NECK: Supple, no JVD, trachea midline.  CHEST/LUNG: CTAB. No rales, rhonchi, wheezing, or rubs. Unlabored respirations.  HEART: RRR, no M/R/G, S1/S2  ABDOMEN: Soft, nontender, nondistended, no organomegaly. Normoactive bowel sounds.  EXTREMITIES:  2+ peripheral pulses b/l, brisk capillary refill. No clubbing, cyanosis, or edema.  Neurological:  AAOx3, no focal deficits.   SKIN: No rashes or lesions.  PSYCH: Normal affect and mood. VITALS:   T(C): 36.7 (10-13-22 @ 08:45), Max: 39.4 (10-13-22 @ 07:26)  HR: 92 (10-13-22 @ 10:05) (92 - 126)  BP: 88/54 (10-13-22 @ 08:45) (87/47 - 101/58)  RR: 22 (10-13-22 @ 10:05) (20 - 30)  SpO2: 95% (10-13-22 @ 10:05) (87% - 100%)    PHYSICAL EXAM:  GENERAL: lying comfortable in bed, in no acute distress  HENMT: Atraumatic, dry mucous membranes, no oropharyngeal exudates or vesicles, uvula is midline, tender to palpation at b/l jaw and right neck, no masses or nodules, no fluctuance noted  EYES: Clear bilaterally, PERRL, EOMs intact b/l  HEART: RRR, S1/S2, no murmur/gallops/rubs  RESPIRATORY: bibasilar crackles L>R, no wheezes or rhonchi  ABDOMEN: +BS, soft, nontender, nondistended  EXTREMITIES: No lower extremity edema, +2 radial pulses b/l  NEURO:  A&Ox4, no focal motor deficits or sensory deficits   Heme/LYMPH: No ecchymosis or bruising, no anterior/posterior cervical or supraclavicular LAD  SKIN:  Skin normal color for race, warm, dry and intact. No evidence of rash. VITALS:   T(C): 36.7 (10-13-22 @ 08:45), Max: 39.4 (10-13-22 @ 07:26)  HR: 92 (10-13-22 @ 10:05) (92 - 126)  BP: 88/54 (10-13-22 @ 08:45) (87/47 - 101/58)  RR: 22 (10-13-22 @ 10:05) (20 - 30)  SpO2: 95% (10-13-22 @ 10:05) (87% - 100%)    PHYSICAL EXAM:  GENERAL: lying comfortable in bed, in no acute distress  HENMT: Atraumatic, dry mucous membranes, no oropharyngeal exudates or vesicles, uvula is midline, tender to palpation at b/l jaw and right neck, no masses or nodules, no edema, no fluctuance noted  EYES: Clear bilaterally, PERRL, EOMs intact b/l  HEART: RRR, S1/S2, no murmur/gallops/rubs  RESPIRATORY: bibasilar crackles L>R, no wheezes or rhonchi  ABDOMEN: +BS, soft, nontender, nondistended  EXTREMITIES: No lower extremity edema, +2 radial pulses b/l  NEURO:  A&Ox4, no focal motor deficits or sensory deficits   Heme/LYMPH: No ecchymosis or bruising, no anterior/posterior cervical or supraclavicular LAD  SKIN:  Skin normal color for race, warm, dry and intact. No evidence of rash.

## 2022-10-13 NOTE — H&P ADULT - NSHPSOCIALHISTORY_GEN_ALL_CORE
early care home due to hx of RA at the beginning of pandemic. used to work in registration at [employer]   lives with  early detention due to hx of RA at the beginning of pandemic. 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

## 2022-10-14 DIAGNOSIS — R79.89 OTHER SPECIFIED ABNORMAL FINDINGS OF BLOOD CHEMISTRY: ICD-10-CM

## 2022-10-14 DIAGNOSIS — J18.9 PNEUMONIA, UNSPECIFIED ORGANISM: ICD-10-CM

## 2022-10-14 DIAGNOSIS — Z29.9 ENCOUNTER FOR PROPHYLACTIC MEASURES, UNSPECIFIED: ICD-10-CM

## 2022-10-14 DIAGNOSIS — M06.9 RHEUMATOID ARTHRITIS, UNSPECIFIED: ICD-10-CM

## 2022-10-14 DIAGNOSIS — R11.2 NAUSEA WITH VOMITING, UNSPECIFIED: ICD-10-CM

## 2022-10-14 DIAGNOSIS — D64.9 ANEMIA, UNSPECIFIED: ICD-10-CM

## 2022-10-14 DIAGNOSIS — E05.80 OTHER THYROTOXICOSIS WITHOUT THYROTOXIC CRISIS OR STORM: ICD-10-CM

## 2022-10-14 DIAGNOSIS — E87.6 HYPOKALEMIA: ICD-10-CM

## 2022-10-14 LAB
BASOPHILS # BLD AUTO: 0.03 K/UL — SIGNIFICANT CHANGE UP (ref 0–0.2)
BASOPHILS NFR BLD AUTO: 0.3 % — SIGNIFICANT CHANGE UP (ref 0–2)
BLD GP AB SCN SERPL QL: NEGATIVE — SIGNIFICANT CHANGE UP
CULTURE RESULTS: NO GROWTH — SIGNIFICANT CHANGE UP
EOSINOPHIL # BLD AUTO: 0.01 K/UL — SIGNIFICANT CHANGE UP (ref 0–0.5)
EOSINOPHIL NFR BLD AUTO: 0.1 % — SIGNIFICANT CHANGE UP (ref 0–6)
HCT VFR BLD CALC: 21.4 % — LOW (ref 34.5–45)
HCT VFR BLD CALC: 23.7 % — LOW (ref 34.5–45)
HGB BLD-MCNC: 7 G/DL — CRITICAL LOW (ref 11.5–15.5)
HGB BLD-MCNC: 7.5 G/DL — LOW (ref 11.5–15.5)
IMM GRANULOCYTES NFR BLD AUTO: 0.6 % — SIGNIFICANT CHANGE UP (ref 0–0.9)
LYMPHOCYTES # BLD AUTO: 1.83 K/UL — SIGNIFICANT CHANGE UP (ref 1–3.3)
LYMPHOCYTES # BLD AUTO: 18.5 % — SIGNIFICANT CHANGE UP (ref 13–44)
MCHC RBC-ENTMCNC: 29.1 PG — SIGNIFICANT CHANGE UP (ref 27–34)
MCHC RBC-ENTMCNC: 29.5 PG — SIGNIFICANT CHANGE UP (ref 27–34)
MCHC RBC-ENTMCNC: 31.6 GM/DL — LOW (ref 32–36)
MCHC RBC-ENTMCNC: 32.7 GM/DL — SIGNIFICANT CHANGE UP (ref 32–36)
MCV RBC AUTO: 90.3 FL — SIGNIFICANT CHANGE UP (ref 80–100)
MCV RBC AUTO: 91.9 FL — SIGNIFICANT CHANGE UP (ref 80–100)
MONOCYTES # BLD AUTO: 0.87 K/UL — SIGNIFICANT CHANGE UP (ref 0–0.9)
MONOCYTES NFR BLD AUTO: 8.8 % — SIGNIFICANT CHANGE UP (ref 2–14)
NEUTROPHILS # BLD AUTO: 7.1 K/UL — SIGNIFICANT CHANGE UP (ref 1.8–7.4)
NEUTROPHILS NFR BLD AUTO: 71.7 % — SIGNIFICANT CHANGE UP (ref 43–77)
NRBC # BLD: 0 /100 WBCS — SIGNIFICANT CHANGE UP (ref 0–0)
NRBC # BLD: 0 /100 WBCS — SIGNIFICANT CHANGE UP (ref 0–0)
PLATELET # BLD AUTO: 312 K/UL — SIGNIFICANT CHANGE UP (ref 150–400)
PLATELET # BLD AUTO: 336 K/UL — SIGNIFICANT CHANGE UP (ref 150–400)
RBC # BLD: 2.37 M/UL — LOW (ref 3.8–5.2)
RBC # BLD: 2.58 M/UL — LOW (ref 3.8–5.2)
RBC # FLD: 13.4 % — SIGNIFICANT CHANGE UP (ref 10.3–14.5)
RBC # FLD: 13.5 % — SIGNIFICANT CHANGE UP (ref 10.3–14.5)
RETICS #: 48.6 K/UL — SIGNIFICANT CHANGE UP (ref 25–125)
RETICS/RBC NFR: 1.9 % — SIGNIFICANT CHANGE UP (ref 0.5–2.5)
RH IG SCN BLD-IMP: POSITIVE — SIGNIFICANT CHANGE UP
SPECIMEN SOURCE: SIGNIFICANT CHANGE UP
T3 SERPL-MCNC: 286 NG/DL — HIGH (ref 80–200)
T4 AB SER-ACNC: 24.9 UG/DL — HIGH (ref 4.6–12)
TSH SERPL-MCNC: 0.02 UIU/ML — LOW (ref 0.27–4.2)
WBC # BLD: 14.01 K/UL — HIGH (ref 3.8–10.5)
WBC # BLD: 9.9 K/UL — SIGNIFICANT CHANGE UP (ref 3.8–10.5)
WBC # FLD AUTO: 14.01 K/UL — HIGH (ref 3.8–10.5)
WBC # FLD AUTO: 9.9 K/UL — SIGNIFICANT CHANGE UP (ref 3.8–10.5)

## 2022-10-14 PROCEDURE — 99254 IP/OBS CNSLTJ NEW/EST MOD 60: CPT

## 2022-10-14 PROCEDURE — 99233 SBSQ HOSP IP/OBS HIGH 50: CPT | Mod: GC

## 2022-10-14 PROCEDURE — 99291 CRITICAL CARE FIRST HOUR: CPT | Mod: 25,GC

## 2022-10-14 PROCEDURE — 71045 X-RAY EXAM CHEST 1 VIEW: CPT | Mod: 26

## 2022-10-14 RX ORDER — FUROSEMIDE 40 MG
20 TABLET ORAL ONCE
Refills: 0 | Status: COMPLETED | OUTPATIENT
Start: 2022-10-14 | End: 2022-10-15

## 2022-10-14 RX ORDER — AMLODIPINE BESYLATE 2.5 MG/1
1 TABLET ORAL
Qty: 0 | Refills: 0 | DISCHARGE

## 2022-10-14 RX ORDER — ACETAMINOPHEN 500 MG
1000 TABLET ORAL ONCE
Refills: 0 | Status: COMPLETED | OUTPATIENT
Start: 2022-10-14 | End: 2022-10-14

## 2022-10-14 RX ORDER — LANOLIN ALCOHOL/MO/W.PET/CERES
3 CREAM (GRAM) TOPICAL AT BEDTIME
Refills: 0 | Status: DISCONTINUED | OUTPATIENT
Start: 2022-10-14 | End: 2022-10-19

## 2022-10-14 RX ORDER — PANTOPRAZOLE SODIUM 20 MG/1
40 TABLET, DELAYED RELEASE ORAL
Refills: 0 | Status: DISCONTINUED | OUTPATIENT
Start: 2022-10-14 | End: 2022-10-19

## 2022-10-14 RX ADMIN — Medication 400 MILLIGRAM(S): at 05:01

## 2022-10-14 RX ADMIN — Medication 20 MILLIGRAM(S): at 12:45

## 2022-10-14 RX ADMIN — Medication 1000 MILLIGRAM(S): at 06:00

## 2022-10-14 RX ADMIN — PIPERACILLIN AND TAZOBACTAM 25 GRAM(S): 4; .5 INJECTION, POWDER, LYOPHILIZED, FOR SOLUTION INTRAVENOUS at 23:02

## 2022-10-14 RX ADMIN — PIPERACILLIN AND TAZOBACTAM 25 GRAM(S): 4; .5 INJECTION, POWDER, LYOPHILIZED, FOR SOLUTION INTRAVENOUS at 05:01

## 2022-10-14 RX ADMIN — Medication 3 MILLIGRAM(S): at 02:12

## 2022-10-14 RX ADMIN — PIPERACILLIN AND TAZOBACTAM 25 GRAM(S): 4; .5 INJECTION, POWDER, LYOPHILIZED, FOR SOLUTION INTRAVENOUS at 13:11

## 2022-10-14 NOTE — PROGRESS NOTE ADULT - PROBLEM SELECTOR PLAN 1
Sepsis 2/2 suspected viral syndrome c/b pna vs aspiration event. Complicated by acute hypoxic respiratory failure requiring HFNC    c/w Zosyn (atypical organisms covered outpatient)  c/w O2 supplementation, wean as possible Sepsis 2/2 suspected viral syndrome c/b pna vs aspiration event. Complicated by acute hypoxic respiratory failure requiring HFNC. Downgraded to NC. Remains tachypnic. PCT elevated to 6.  -c/w Zosyn (atypical organisms covered outpatient)  -c/w O2 supplementation, wean as tolerated  -Incentive Spirometer  -F/u Blood and urine cx  -Repeat CXR Sepsis 2/2 suspected viral syndrome c/b pna vs aspiration event. Complicated by acute hypoxic respiratory failure requiring HFNC. Downgraded to NC. Remains tachypneic. PCT elevated to 6.  -c/w Zosyn (atypical organisms covered outpatient)  -c/w O2 supplementation, wean as tolerated  -Incentive Spirometer  -F/u Blood and urine cx  -Repeat CXR

## 2022-10-14 NOTE — PROGRESS NOTE ADULT - TIME BILLING
Examination, discussion re imagining findings, reviewing outside labs provided by patient at bedside.

## 2022-10-14 NOTE — CONSULT NOTE ADULT - SUBJECTIVE AND OBJECTIVE BOX
HPI:  Snana Allen is a 56F with hx of HTN, RA on leflunomide, anxiety who p/w fever, chills, cough, n/v, decreased po intake. Patient reports that she received an EGD two weeks ago and developed a sore throat shortly thereafter. Since then has had worsening neck pain. Around 9 days ago started developing fevers T max 107, chills, diaphoresis that did not resolve despite taking tylenol. Went to PCP who prescribed azithromycin and levofloxacin, completed course. however continued to have fevers, chills. Became nauseous a few days ago and started vomiting soon after she starts eating. NBNB, just food colored. Subsequent decreased pO intake, only able to take rice pudding. Reports that her neck and jaw continue to hurt. No dysphagia, no abdominal pain, no cough, chest pain. This morning reports that she took her pulse ox and found to have low oxygen sat mid80s% and came to the ED for further evaluation.    Last steroids was in August 2022, x2 weeks, completed. In the ED, temp 101.5, , 87/56, RR 20, 87% RA. Started on HFNC, given 2.6 L LR, vanc/zosyn, ofirmev, solucortef 100 mg (13 Oct 2022 10:33)    Endocrine consulted for thyrotoxicosis secondary to thyroiditis. Pt. denies any prior hx of thyroid disease. No family hx of thyroid disorders. Received IV contrast in the ER. No radiation to the head or neck. No use of lithium or amiodarone. No immunotherapy, but on lefluonmide for RA. + sore throat after EGD but no recent URI's. No dysphagia or dysphonia. + pain in the anterior neck. + symptoms x 2 weeks of palpitations, anxiety, tremors, and weight loss. No diarrhea.       PAST MEDICAL & SURGICAL HISTORY:  Hypertension      Rheumatoid arthritis          FAMILY HISTORY: No hx of thyroid disorders in parents or siblings      Social History: No tobacco or alcohol use    Outpatient Medications:  Lefluonmide  Propranolol  Paroxetine    MEDICATIONS  (STANDING):  chlorhexidine 4% Liquid 1 Application(s) Topical <User Schedule>  enoxaparin Injectable 40 milliGRAM(s) SubCutaneous every 24 hours  furosemide   Injectable 20 milliGRAM(s) IV Push once  pantoprazole    Tablet 40 milliGRAM(s) Oral before breakfast  PARoxetine 20 milliGRAM(s) Oral daily  piperacillin/tazobactam IVPB.. 3.375 Gram(s) IV Intermittent once  piperacillin/tazobactam IVPB.. 3.375 Gram(s) IV Intermittent every 8 hours  propranolol 10 milliGRAM(s) Oral every 8 hours    MEDICATIONS  (PRN):  melatonin 3 milliGRAM(s) Oral at bedtime PRN Insomnia      Allergies    No Known Allergies    Intolerances      Review of Systems:  Constitutional: +fever, + weight loss  Eyes: No blurry vision  Neuro: No headache, No paresthesias  HEENT: +sore throat and neck pain  Cardiovascular: No chest pain +palpitations  Respiratory: No SOB  GI: +nausea and vomiting  : No polyuria  Skin: no rash  Psych: no depression  Endocrine: No polydipsia, No heat or cold intolerance, rest as noted in HPI  Hem/lymph: no swelling  Neuro: + tremors    All other review of systems negative      PHYSICAL EXAM:  VITALS: T(C): 36.9 (10-14-22 @ 14:35)  T(F): 98.5 (10-14-22 @ 14:35), Max: 99.9 (10-14-22 @ 04:45)  HR: 125 (10-14-22 @ 16:07) (95 - 130)  BP: 152/79 (10-14-22 @ 14:35) (107/63 - 152/79)  RR:  (19 - 44)  SpO2:  (80% - 100%)  Wt(kg): --  GENERAL: NAD at this time  EYES: No proptosis, EOMI, no lid lag  HEENT:  Atraumatic, Normocephalic,   THYROID: Normal size, no palpable nodules, no bruit  RESPIRATORY: Clear to auscultation bilaterally, full excursion, non-labored  CARDIOVASCULAR: Regular rhythm; +tachycardic, No murmurs; no peripheral edema  GI: Soft, nontender, non distended, normal bowel sounds  SKIN: Dry, intact, No rashes or lesions  MUSCULOSKELETAL: normal strength  NEURO: follows commands, +b/l resting tremor,   PSYCH: Alert and oriented x 3, normal affect, normal mood  CUSHING'S SIGNS: no striae                                  7.5    14.01 )-----------( 336      ( 14 Oct 2022 13:16 )             23.7       10-13    145  |  110<H>  |  9   ----------------------------<  128<H>  4.0   |  25  |  0.53    eGFR: 108    Ca    8.4      10-13  Mg     2.0     10-13  Phos  2.8     10-13    TPro  6.0  /  Alb  3.0<L>  /  TBili  0.4  /  DBili  x   /  AST  35  /  ALT  46<H>  /  AlkPhos  130<H>  10-13    Thyroid Function Tests:  10-13 @ 14:39 TSH -- FreeT4 4.8 T3 -- Anti TPO -- Anti Thyroglobulin Ab -- TSI --  10-13 @ 08:17 TSH 0.02 FreeT4 -- T3 286 Anti TPO -- Anti Thyroglobulin Ab -- TSI --            Radiology:

## 2022-10-14 NOTE — PROGRESS NOTE ADULT - PROBLEM SELECTOR PLAN 4
K+ 2.7 on latest BMP; Phos 1.8 as well; likely in s/o recurrent vomiting     - Replete lytes as necessary  - BMP q12hr K+ 2.7 on latest BMP; Phos 1.8 as well; likely in s/o recurrent vomiting rpt K 4.0    - Replete lytes as necessary  - BMP in AM

## 2022-10-14 NOTE — PROGRESS NOTE ADULT - ATTENDING COMMENTS
Ms. Allen is a 57 yo F with PMHx HTN, RA (on Leflunomide for >1yr), GERD (uncontrolled), anxiety who presents with complaint of 2 weeks of sore throat, N/V (NB/NB), fevers (Tmax per patient 107, here 101.2) hypotensive on presentation s/p 2.6L IVFs ultimately req Levophed and hypoxemic resp failure on HFNC.  She had recent EGD/Colonscopy as outpatient just prior to symptom onset and was treated as outpatient with course of Azithromycin and Levaquin without improvement.  Admitted to MICU with septic shock and hypoxic resp failure.  CTA negative for PE.    #Septic shock, suspected viral syndrome complicated by PNA vs Aspiration (resolved)  - started on Levophed but able to titrate off shortly after arrival in ICU  - Continue Zosyn for aspiration PNA (already rcvd atypical coverage as outpatient)  - POCUS with B-lines on left; preserved RV/LV; LV>RV  #Hypoxic respiratory failure d/t multilobar PNA  - weaned from HFNC to RA  - cont Zosyn  #Sore throat/Retropharyngeal edema  - limited oral exam but no e/o tonsillar exudates  - CT neck with thyroiditis/retropharyngeal edema  - latter may be due to N/V induced inflammation  - seen by ENT, recommend NSAIDs and outpatient follow-up  #Thyroiditis  - Endo consult  - restart home Propanolol  #GERD  - will contact outpatient GI for findings, checked for H. pylori unclear if biopsies performed  - start PPI  #Anemia (baseline Hb 11.7-12.3 per labs from 08/13/22)  - no history of bleeding/melena/hematemesis, monitor H/H,  transufse for hb<7  - send iron studies, b12, folate  #PPx: Lovenox SQ    Sil Palacios MD

## 2022-10-14 NOTE — PROGRESS NOTE ADULT - SUBJECTIVE AND OBJECTIVE BOX
CHIEF COMPLAINT:    Interval Events:    REVIEW OF SYSTEMS:  Constitutional: [ ] negative [ ] fevers [ ] chills [ ] weight loss [ ] weight gain  HEENT: [ ] negative [ ] dry eyes [ ] eye irritation [ ] postnasal drip [ ] nasal congestion  CV: [ ] negative  [ ] chest pain [ ] orthopnea [ ] palpitations [ ] murmur  Resp: [ ] negative [ ] cough [ ] shortness of breath [ ] dyspnea [ ] wheezing [ ] sputum [ ] hemoptysis  GI: [ ] negative [ ] nausea [ ] vomiting [ ] diarrhea [ ] constipation [ ] abd pain [ ] dysphagia   : [ ] negative [ ] dysuria [ ] nocturia [ ] hematuria [ ] increased urinary frequency  Musculoskeletal: [ ] negative [ ] back pain [ ] myalgias [ ] arthralgias [ ] fracture  Skin: [ ] negative [ ] rash [ ] itch  Neurological: [ ] negative [ ] headache [ ] dizziness [ ] syncope [ ] weakness [ ] numbness  Psychiatric: [ ] negative [ ] anxiety [ ] depression  Endocrine: [ ] negative [ ] diabetes [ ] thyroid problem  Hematologic/Lymphatic: [ ] negative [ ] anemia [ ] bleeding problem  Allergic/Immunologic: [ ] negative [ ] itchy eyes [ ] nasal discharge [ ] hives [ ] angioedema  [ ] All other systems negative  [ ] Unable to assess ROS because ________    OBJECTIVE:  ICU Vital Signs Last 24 Hrs  T(C): 37.2 (14 Oct 2022 06:00), Max: 37.7 (14 Oct 2022 04:45)  T(F): 99 (14 Oct 2022 06:00), Max: 99.9 (14 Oct 2022 04:45)  HR: 107 (14 Oct 2022 08:00) (82 - 193)  BP: 127/68 (14 Oct 2022 08:00) (87/47 - 149/72)  BP(mean): 90 (14 Oct 2022 08:00) (70 - 99)  ABP: --  ABP(mean): --  RR: 31 (14 Oct 2022 08:00) (18 - 33)  SpO2: 99% (14 Oct 2022 08:00) (90% - 100%)    O2 Parameters below as of 14 Oct 2022 04:45  Patient On (Oxygen Delivery Method): nasal cannula  O2 Flow (L/min): 2            10-13 @ 07:01  -  10-14 @ 07:00  --------------------------------------------------------  IN: 2107.5 mL / OUT: 2400 mL / NET: -292.5 mL      CAPILLARY BLOOD GLUCOSE          PHYSICAL EXAM:  GENERAL: NAD, well-groomed, well-developed  HEAD:  Atraumatic, Normocephalic  EYES: EOMI, PERRLA, conjunctiva and sclera clear  ENMT: No tonsillar erythema, exudates, or enlargement; Moist mucous membranes, Good dentition, No lesions  NECK: Supple, No JVD, Normal thyroid  CHEST/LUNG: Clear to auscultation bilaterally; No rales, rhonchi, wheezing, or rubs  HEART: Regular rate and rhythm; No murmurs, rubs, or gallops  ABDOMEN: Soft, Nontender, Nondistended; Bowel sounds present  VASCULAR:  2+ Peripheral Pulses, No clubbing, cyanosis, or edema  LYMPH: No lymphadenopathy noted  SKIN: No rashes or lesions  NERVOUS SYSTEM:  Alert & Oriented X3, Good concentration; Motor Strength 5/5 B/L upper and lower extremities; DTRs 2+ intact and symmetric    LINES:    HOSPITAL MEDICATIONS:  enoxaparin Injectable 40 milliGRAM(s) SubCutaneous every 24 hours    piperacillin/tazobactam IVPB.. 3.375 Gram(s) IV Intermittent every 8 hours  piperacillin/tazobactam IVPB.. 3.375 Gram(s) IV Intermittent once          melatonin 3 milliGRAM(s) Oral at bedtime PRN  PARoxetine 20 milliGRAM(s) Oral daily              chlorhexidine 4% Liquid 1 Application(s) Topical <User Schedule>        LABS:                        7.0    9.90  )-----------( 312      ( 14 Oct 2022 00:33 )             21.4     Hgb Trend: 7.0<--, 6.9<--, 8.5<--  10-13    145  |  110<H>  |  9   ----------------------------<  128<H>  4.0   |  25  |  0.53    Ca    8.4      13 Oct 2022 23:12  Phos  2.8     10-13  Mg     2.0     10-13    TPro  6.0  /  Alb  3.0<L>  /  TBili  0.4  /  DBili  x   /  AST  35  /  ALT  46<H>  /  AlkPhos  130<H>  10-13    Creatinine Trend: 0.53<--, 0.54<--, 0.82<--  PT/INR - ( 13 Oct 2022 08:17 )   PT: 17.6 sec;   INR: 1.51 ratio         PTT - ( 13 Oct 2022 08:17 )  PTT:27.6 sec  Urinalysis Basic - ( 13 Oct 2022 10:48 )    Color: Colorless / Appearance: Clear / S.006 / pH: x  Gluc: x / Ketone: Trace  / Bili: Negative / Urobili: Negative   Blood: x / Protein: Negative / Nitrite: Negative   Leuk Esterase: Negative / RBC: x / WBC x   Sq Epi: x / Non Sq Epi: x / Bacteria: x        Venous Blood Gas:  10-13 @ 23:09  7.41/43/39/27/64.7  VBG Lactate: 1.0  Venous Blood Gas:  10-13 @ 09:51  7.42/42/45/27/74.9  VBG Lactate: 1.0  Venous Blood Gas:  10-13 @ 07:25  7.47/41/46/30/79.2  VBG Lactate: 1.9      MICROBIOLOGY:     RADIOLOGY:  [ ] Reviewed and interpreted by me    EKG:

## 2022-10-14 NOTE — CHART NOTE - NSCHARTNOTEFT_GEN_A_CORE
PROGRESS NOTE:     Patient is a 56y old  Female who presents with a chief complaint of airway monitoring and protection (14 Oct 2022 08:11)      SUBJECTIVE / OVERNIGHT EVENTS:    ADDITIONAL REVIEW OF SYSTEMS:    MEDICATIONS  (STANDING):  chlorhexidine 4% Liquid 1 Application(s) Topical <User Schedule>  enoxaparin Injectable 40 milliGRAM(s) SubCutaneous every 24 hours  pantoprazole    Tablet 40 milliGRAM(s) Oral before breakfast  PARoxetine 20 milliGRAM(s) Oral daily  piperacillin/tazobactam IVPB.. 3.375 Gram(s) IV Intermittent every 8 hours  piperacillin/tazobactam IVPB.. 3.375 Gram(s) IV Intermittent once  propranolol 10 milliGRAM(s) Oral every 8 hours    MEDICATIONS  (PRN):  melatonin 3 milliGRAM(s) Oral at bedtime PRN Insomnia      CAPILLARY BLOOD GLUCOSE        I&O's Summary    13 Oct 2022 07:01  -  14 Oct 2022 07:00  --------------------------------------------------------  IN: 2107.5 mL / OUT: 2400 mL / NET: -292.5 mL    14 Oct 2022 07:01  -  14 Oct 2022 15:25  --------------------------------------------------------  IN: 530 mL / OUT: 850 mL / NET: -320 mL        PHYSICAL EXAM:  Vital Signs Last 24 Hrs  T(C): 36.9 (14 Oct 2022 14:35), Max: 37.7 (14 Oct 2022 04:45)  T(F): 98.5 (14 Oct 2022 14:35), Max: 99.9 (14 Oct 2022 04:45)  HR: 119 (14 Oct 2022 14:35) (88 - 130)  BP: 152/79 (14 Oct 2022 14:35) (100/59 - 152/79)  BP(mean): 81 (14 Oct 2022 12:00) (75 - 109)  RR: 44 (14 Oct 2022 14:35) (19 - 44)  SpO2: 94% (14 Oct 2022 14:35) (90% - 100%)    Parameters below as of 14 Oct 2022 14:35  Patient On (Oxygen Delivery Method): nasal cannula  O2 Flow (L/min): 2      GENERAL: NAD, lying in bed comfortably  HEAD:  Atraumatic, Normocephalic  EYES: EOMI, PERRLA, conjunctiva and sclera clear  ENT: Moist mucous membranes  NECK: Supple, No JVD  CHEST/LUNG: Clear to auscultation bilaterally; No rales, rhonchi, wheezing, or rubs. Unlabored respirations  HEART: Regular rate and rhythm; No murmurs, rubs, or gallops  ABDOMEN: BSx4; Soft, nontender, nondistended  EXTREMITIES:  2+ Peripheral Pulses, brisk capillary refill. No clubbing, cyanosis, or edema  NERVOUS SYSTEM:  A&Ox3, no focal deficits   SKIN: No rashes or lesions    LABS:                        7.5    14.01 )-----------( 336      ( 14 Oct 2022 13:16 )             23.7     10-13    145  |  110<H>  |  9   ----------------------------<  128<H>  4.0   |  25  |  0.53    Ca    8.4      13 Oct 2022 23:12  Phos  2.8     10-13  Mg     2.0     10-13    TPro  6.0  /  Alb  3.0<L>  /  TBili  0.4  /  DBili  x   /  AST  35  /  ALT  46<H>  /  AlkPhos  130<H>  10-13    PT/INR - ( 13 Oct 2022 08:17 )   PT: 17.6 sec;   INR: 1.51 ratio         PTT - ( 13 Oct 2022 08:17 )  PTT:27.6 sec      Urinalysis Basic - ( 13 Oct 2022 10:48 )    Color: Colorless / Appearance: Clear / S.006 / pH: x  Gluc: x / Ketone: Trace  / Bili: Negative / Urobili: Negative   Blood: x / Protein: Negative / Nitrite: Negative   Leuk Esterase: Negative / RBC: x / WBC x   Sq Epi: x / Non Sq Epi: x / Bacteria: x        Culture - Urine (collected 13 Oct 2022 10:48)  Source: Clean Catch Clean Catch (Midstream)  Final Report (14 Oct 2022 13:50):    No growth        RADIOLOGY & ADDITIONAL TESTS:  Results Reviewed:   Imaging Personally Reviewed:  Electrocardiogram Personally Reviewed:    COORDINATION OF CARE:  Care Discussed with Consultants/Other Providers [Y/N]:  Prior or Outpatient Records Reviewed [Y/N]:

## 2022-10-14 NOTE — PROGRESS NOTE ADULT - PROBLEM SELECTOR PLAN 2
Thyroiditis as noted in CT, TSH low, T4 elevated    - f/u Endo reccs Thyroiditis as noted in CT, TSH low, T4 elevated  - C/w propanolol 10mg q8h  - f/u Endo reccs  - Check Auto-Abs for autoimmune thyroiditis

## 2022-10-14 NOTE — PROGRESS NOTE ADULT - SUBJECTIVE AND OBJECTIVE BOX
INCOMPLETE NOTE    Iván Vizcarra | PGY1| Pager: [phone]  Interval Events:    REVIEW OF SYSTEMS:  CONSTITUTIONAL: No weakness, fevers or chills  EYES/ENT: No visual changes;  No vertigo or throat pain   NECK: No pain or stiffness  RESPIRATORY: No cough, wheezing, hemoptysis; No shortness of breath  CARDIOVASCULAR: No chest pain or palpitations  GASTROINTESTINAL: No abdominal or epigastric pain. No nausea, vomiting, or hematemesis; No diarrhea or constipation. No melena or hematochezia.  GENITOURINARY: No dysuria, frequency or hematuria  NEUROLOGICAL: No numbness or weakness  SKIN: No itching, burning, rashes, or lesions   All other review of systems is negative unless indicated above.    OBJECTIVE:  ICU Vital Signs Last 24 Hrs  T(C): 36.9 (14 Oct 2022 14:35), Max: 37.7 (14 Oct 2022 04:45)  T(F): 98.5 (14 Oct 2022 14:35), Max: 99.9 (14 Oct 2022 04:45)  HR: 119 (14 Oct 2022 14:35) (88 - 130)  BP: 152/79 (14 Oct 2022 14:35) (100/59 - 152/79)  BP(mean): 81 (14 Oct 2022 12:00) (75 - 109)  ABP: --  ABP(mean): --  RR: 44 (14 Oct 2022 14:35) (19 - 44)  SpO2: 94% (14 Oct 2022 14:35) (90% - 100%)    O2 Parameters below as of 14 Oct 2022 14:35  Patient On (Oxygen Delivery Method): nasal cannula  O2 Flow (L/min): 2            10-13 @ :  -  10-14 @ 07:00  --------------------------------------------------------  IN: 2107.5 mL / OUT: 2400 mL / NET: -292.5 mL    10-14 @ 07:01  -  10-14 @ 15:14  --------------------------------------------------------  IN: 530 mL / OUT: 850 mL / NET: -320 mL      CAPILLARY BLOOD GLUCOSE          PHYSICAL EXAM:  General: WN/WD NAD  Neurology: A&Ox3, nonfocal, HERNANDEZ x 4  Eyes: PERRLA/ EOMI, Gross vision intact  ENT/Neck: Neck supple, trachea midline, No JVD, Gross hearing intact  Respiratory: CTA B/L, No wheezing, rales, rhonchi  CV: RRR, +S1/S2, -S3/S4, no murmurs, rubs or gallops  Abdominal: Soft, NT, ND +BS, No HSM  MSK: 5/5 strength UE/LE bilaterally  Extremities: No edema, 2+ peripheral pulses  Skin: No Rashes, Hematoma, Ecchymosis  Incisions:   Tubes:    HOSPITAL MEDICATIONS:  MEDICATIONS  (STANDING):  chlorhexidine 4% Liquid 1 Application(s) Topical <User Schedule>  enoxaparin Injectable 40 milliGRAM(s) SubCutaneous every 24 hours  pantoprazole    Tablet 40 milliGRAM(s) Oral before breakfast  PARoxetine 20 milliGRAM(s) Oral daily  piperacillin/tazobactam IVPB.. 3.375 Gram(s) IV Intermittent every 8 hours  piperacillin/tazobactam IVPB.. 3.375 Gram(s) IV Intermittent once  propranolol 10 milliGRAM(s) Oral every 8 hours    MEDICATIONS  (PRN):  melatonin 3 milliGRAM(s) Oral at bedtime PRN Insomnia      LABS:                        7.5    14.01 )-----------( 336      ( 14 Oct 2022 13:16 )             23.7     Hgb Trend: 7.5<--, 7.0<--, 6.9<--, 8.5<--  10-13    145  |  110<H>  |  9   ----------------------------<  128<H>  4.0   |  25  |  0.53    Ca    8.4      13 Oct 2022 23:12  Phos  2.8     10-13  Mg     2.0     10-13    TPro  6.0  /  Alb  3.0<L>  /  TBili  0.4  /  DBili  x   /  AST  35  /  ALT  46<H>  /  AlkPhos  130<H>  10-13    Creatinine Trend: 0.53<--, 0.54<--, 0.82<--  PT/INR - ( 13 Oct 2022 08:17 )   PT: 17.6 sec;   INR: 1.51 ratio         PTT - ( 13 Oct 2022 08:17 )  PTT:27.6 sec  Urinalysis Basic - ( 13 Oct 2022 10:48 )    Color: Colorless / Appearance: Clear / S.006 / pH: x  Gluc: x / Ketone: Trace  / Bili: Negative / Urobili: Negative   Blood: x / Protein: Negative / Nitrite: Negative   Leuk Esterase: Negative / RBC: x / WBC x   Sq Epi: x / Non Sq Epi: x / Bacteria: x        Venous Blood Gas:  10-13 @ 23:09  7.41/43/39/27/64.7  VBG Lactate: 1.0  Venous Blood Gas:  10-13 @ 09:51  7.42/42/45/27/74.9  VBG Lactate: 1.0  Venous Blood Gas:  10-13 @ 07:25  7.47/41/46/30/79.2  VBG Lactate: 1.9      MICROBIOLOGY:     Culture - Urine (collected 13 Oct 2022 10:48)  Source: Clean Catch Clean Catch (Midstream)  Final Report (14 Oct 2022 13:50):    No growth       Internal Medicine    Interval Events:  Downgraded from MICU. Reports SOB, tachypnea and loose BMs x2 this morning.    REVIEW OF SYSTEMS:  CONSTITUTIONAL: No weakness, fevers or chills  EYES/ENT: No visual changes;  No vertigo or throat pain   NECK: No pain or stiffness  RESPIRATORY: No cough, wheezing, hemoptysis; +SOB  CARDIOVASCULAR: No chest pain or palpitations  GASTROINTESTINAL: No abdominal or epigastric pain. + nausea, + loose BMs. NO Hematochezia  GENITOURINARY: No dysuria, frequency or hematuria  NEUROLOGICAL: No numbness or weakness  SKIN: No itching, burning, rashes, or lesions   All other review of systems is negative unless indicated above.    OBJECTIVE:  ICU Vital Signs Last 24 Hrs  T(C): 36.9 (14 Oct 2022 14:35), Max: 37.7 (14 Oct 2022 04:45)  T(F): 98.5 (14 Oct 2022 14:35), Max: 99.9 (14 Oct 2022 04:45)  HR: 119 (14 Oct 2022 14:35) (88 - 130)  BP: 152/79 (14 Oct 2022 14:35) (100/59 - 152/79)  BP(mean): 81 (14 Oct 2022 12:00) (75 - 109)  ABP: --  ABP(mean): --  RR: 44 (14 Oct 2022 14:35) (19 - 44)  SpO2: 94% (14 Oct 2022 14:35) (90% - 100%)    O2 Parameters below as of 14 Oct 2022 14:35  Patient On (Oxygen Delivery Method): nasal cannula  O2 Flow (L/min): 2    10-13 @ :  -  10-14 @ 07:00  --------------------------------------------------------  IN: 2107.5 mL / OUT: 2400 mL / NET: -292.5 mL    10-14 @ 07:01  -  10-14 @ 15:14  --------------------------------------------------------  IN: 530 mL / OUT: 850 mL / NET: -320 mL      CAPILLARY BLOOD GLUCOSE    PHYSICAL EXAM:  General: In mild distress, tachypnic speaking in short sentences  Neurology: A&Ox3, nonfocal, HERNANDEZ x 4  Eyes: PERRLA/ EOMI, Gross vision intact, + Pale conjuctiva  ENT/Neck: Neck supple, trachea midline, +enlarged thyroid, no bruit  Respiratory: Course crackles over L. lung. No wheezing auscultated  CV: RRR, +S1/S2, -S3/S4, no murmurs, rubs or gallops  Abdominal: Soft, NT, ND +BS, No HSM  MSK: 5/5 strength UE/LE bilaterally  Extremities: No edema, 2+ peripheral pulses  Skin: No Rashes, Hematoma, Ecchymosis      HOSPITAL MEDICATIONS:  MEDICATIONS  (STANDING):  chlorhexidine 4% Liquid 1 Application(s) Topical <User Schedule>  enoxaparin Injectable 40 milliGRAM(s) SubCutaneous every 24 hours  pantoprazole    Tablet 40 milliGRAM(s) Oral before breakfast  PARoxetine 20 milliGRAM(s) Oral daily  piperacillin/tazobactam IVPB.. 3.375 Gram(s) IV Intermittent every 8 hours  piperacillin/tazobactam IVPB.. 3.375 Gram(s) IV Intermittent once  propranolol 10 milliGRAM(s) Oral every 8 hours    MEDICATIONS  (PRN):  melatonin 3 milliGRAM(s) Oral at bedtime PRN Insomnia      LABS:                        7.5    14.01 )-----------( 336      ( 14 Oct 2022 13:16 )             23.7     Hgb Trend: 7.5<--, 7.0<--, 6.9<--, 8.5<--  10-13    145  |  110<H>  |  9   ----------------------------<  128<H>  4.0   |  25  |  0.53    Ca    8.4      13 Oct 2022 23:12  Phos  2.8     10-13  Mg     2.0     10-13    TPro  6.0  /  Alb  3.0<L>  /  TBili  0.4  /  DBili  x   /  AST  35  /  ALT  46<H>  /  AlkPhos  130<H>  10-13    Creatinine Trend: 0.53<--, 0.54<--, 0.82<--  PT/INR - ( 13 Oct 2022 08:17 )   PT: 17.6 sec;   INR: 1.51 ratio       PTT - ( 13 Oct 2022 08:17 )  PTT:27.6 sec  Urinalysis Basic - ( 13 Oct 2022 10:48 )    Color: Colorless / Appearance: Clear / S.006 / pH: x  Gluc: x / Ketone: Trace  / Bili: Negative / Urobili: Negative   Blood: x / Protein: Negative / Nitrite: Negative   Leuk Esterase: Negative / RBC: x / WBC x   Sq Epi: x / Non Sq Epi: x / Bacteria: x    Venous Blood Gas:  10-13 @ 23:09  7.41/43/39/27/64.7  VBG Lactate: 1.0  Venous Blood Gas:  10-13 @ 09:51  7.42/42/45/27/74.9  VBG Lactate: 1.0  Venous Blood Gas:  10-13 @ 07:25  7.47/41/46/30/79.2  VBG Lactate: 1.9      MICROBIOLOGY:     Culture - Urine (collected 13 Oct 2022 10:48)  Source: Clean Catch Clean Catch (Midstream)  Final Report (14 Oct 2022 13:50):    No growth    < from: CT Neck Soft Tissue w/ IV Cont (10.13.22 @ 11:34) >  IMPRESSION:    Diffuse enlargement of the thyroid with fat surrounding fat stranding,   compatible with thyroiditis. Mild retropharyngeal edema. No enhancing   collection to suggest abscess.    --- End of Report ---    < end of copied text >  < from: CT Angio Chest PE Protocol w/ IV Cont (10.13.22 @ 11:33) >  IMPRESSION:  No pulmonary embolism.    Mild interlobular septal thickening with patchy and groundglass opacities   in the left upper lobe likely representing mild edema. There may be a   concurrent pneumonia in the left upper and lower lobes. 8 week follow-up   is recommended to assess for improvement.      --- End of Report ---    < end of copied text >

## 2022-10-14 NOTE — PROGRESS NOTE ADULT - PROBLEM SELECTOR PLAN 7
DVT ppx: Lovenox  Diet: Regular with Aspiration precautions  Dispo: Pending clinical course DVT ppx: Lovenox  Diet: Regular with Aspiration precautions  Dispo: Pending clinical course  Directives: Full code

## 2022-10-14 NOTE — PROGRESS NOTE ADULT - ASSESSMENT
56F PMHx of HTN, RA (on leflunomide for >1yr), GERD, anxiety, recent EGD/Colonoscopy just prior to symptom onset presenting with 2 weeks of fevers refractory to outpatient Azithromycin and levaquin initially admitted to MICU for management of septic shock initially requiring HFNC and 1 day of vasopressors currently being treated for pneumonia and thyroiditis.

## 2022-10-14 NOTE — CONSULT NOTE ADULT - PROBLEM SELECTOR RECOMMENDATION 9
- consider NSAIDs  - Endo consult and thyroid panel   - f/u ENT outpatient
Pt. with low TSH and elevated Free T4 in the setting of subacute symptoms of hyperthyroidism over the past 2 weeks s/p sore throat and EGD with associated neck pain with CT revealing thyroiditis.  Recommend increase dose of propranolol for goal HR 70-80.  NSAIDs to decrease inflammation  Depending on how she responds can consider course of prednisone.  Check Thyroid US  Check TSH Receptor Ab and TSI        Fernando Belle D.O  [phone]

## 2022-10-14 NOTE — PROGRESS NOTE ADULT - ASSESSMENT
56F with hx of HTN, RA on leflunomide, anxiety who p/w fever, chills, cough, n/v, decreased po intake after recent EGD.    NEURO:  #Mental status: baseline AAO4  - Currently A&O x 3  - neuro checks per ICU protocol    # anxiety  - c/w home paxil    CV:  # shock, resolved  - likely multifactorial, septic vs hypovolemic iso fevers and 9 days of decreased PO intake, nausea, vomiting  - s/p 2.6L in ED, subsequent POCUS with 2.1 mm IVC in the ED, started on levo, weaned per ICU protocol  - vitals per ICU protocol    # Tropinemia  - trops on admit 229, likely iso demand ischemia  - recheck trops    # hx of HTN  - holding home antihypertensives iso shock    PULM:  #acute hypoxic respiratory failure  - CT chest with multilobar pneumonia, possible aspiration pneumonia iso emesis  - zosyn (10/13- )  - c/w hi flow nasal cannula, wean per ICU protocol  - fungitell  - blood cultures, urine cultures    RENAL:  - no acute issues  - continue to monitor per renal function per ICU protocol    GI:  - diet regular  - hx of gerd  - c/w protonix 40 mg IV PPI    ENDO:  # radiographic evidence of thyroiditis  - CT neck (10/13): diffuse enlargement of thyroid with fat surrounding fat stranding, c/f thyroiditis, mild retropharyngeal edema  - thyroid function tests  - ent consult for retropharyngeal edema    METABOLIC:  #Electrolyte abnormalities  - likely iso decreased PO intake, emesis  - replete prn    HEMATOLOGIC:  #DVT prophylaxis with lovenox    ID:  #sepsis 2/2 multilobar pneumonia  - bcx x2, urine culture, RVP, fungitell  - zosyn (10/13 - )    SKIN:  - no acute issues

## 2022-10-14 NOTE — CHART NOTE - NSCHARTNOTEFT_GEN_A_CORE
MAR Accept Note  Transfer to:  Medicine   Accepting Attending Physician:  Dr. Camarillo  Assigned Room:  [address] [place] [address]    Patient seen and examined.   Labs and data reviewed.   No findings precluding transfer of service.       HPI/MICU COURSE:   Please refer to MICU transfer note for full details. Briefly, this is a 56F with hx of HTN, RA on leflunomide, anxiety who p/w fever, chills, cough, n/v, decreased po intake. Patient reports that she received an EGD two weeks ago and developed a sore throat shortly thereafter. Since then has had worsening neck pain. Around 9 days ago started developing fevers T max 107, chills, diaphoresis that did not resolve despite taking tylenol. Went to PCP who prescribed azithromycin and levofloxacin, completed course. however continued to have fevers, chills. Became nauseous a few days ago and started vomiting soon after she starts eating. NBNB, just food colored. Subsequent decreased pO intake, only able to take rice pudding. Reports that her neck and jaw continue to hurt. No dysphagia, no abdominal pain, no cough, chest pain. This morning reports that she took her pulse ox and found to have low oxygen sat mid80s% and came to the ED for further evaluation. Last steroids was in August 2022, x2 weeks, completed.    Patient accepted to MICU for airway monitoring and protection. In the ICU, patient was weaned off pressors, remained hemodynamically stable. Zosyn was started for RADHA PNA, CT Neck found diffuse thyroiditis.        FOR FOLLOW-UP:    [ ] Trend Hgb  [ ] Follow up thyroid function labs; consider Endo consult for thyroiditis   [ ] Continue Abx for PNA  [ ] Follow-up with GI doc Dr. Jermaine Hill regarding outpt endoscopy reason and results    Lion Brown MD  Internal Medicine PGY-3

## 2022-10-14 NOTE — PROGRESS NOTE ADULT - ATTENDING COMMENTS
Patient seen and examined   Labs and vitals are reviewed   55 Y/O/F with PMHx HTN, RA (on Leflunomide for >1yr), GERD, Anxiety who was initially admitted to MICU with septic shock and hypoxic resp failure and found to have suspected aspiration pneumonia s/p Rx with Levophed and now off.   Still SOB and requiring oxygen   Continue Zosyn   Continue oxygen support  Repeat ABG this afternoon   Low threshold for MICU   Sore throat/  thyroiditis/retropharyngeal edema - Check repeat thyroid function – Free t4 was high   ENT and Endocrine eval   Continue propranolol    Anemia – Iron studies, Haptoglobin, retic count   If she develops sever diarrhea then check C Diff    H/O - RA- get more collaterals   d/w patient and her family at bedside

## 2022-10-14 NOTE — PROGRESS NOTE ADULT - PROBLEM SELECTOR PLAN 3
admit hgb 8.5; downtrended this AM to 6.9/7 on repeat    - Continue to trend  - no s/s of active bleeding admit hgb 8.5; downtrended this AM to 6.9/7 on repeat  - Check labs for hemolysis, ELVIN and vitamin deficiencies  - Continue to trend  - no s/s of active bleeding  - Maintain active T&S

## 2022-10-15 DIAGNOSIS — J96.01 ACUTE RESPIRATORY FAILURE WITH HYPOXIA: ICD-10-CM

## 2022-10-15 LAB
ALBUMIN SERPL ELPH-MCNC: 2.3 G/DL — LOW (ref 3.3–5)
ALBUMIN SERPL ELPH-MCNC: 2.8 G/DL — LOW (ref 3.3–5)
ALBUMIN SERPL ELPH-MCNC: 2.9 G/DL — LOW (ref 3.3–5)
ALP SERPL-CCNC: 140 U/L — HIGH (ref 40–120)
ALP SERPL-CCNC: 149 U/L — HIGH (ref 40–120)
ALP SERPL-CCNC: 163 U/L — HIGH (ref 40–120)
ALT FLD-CCNC: 39 U/L — SIGNIFICANT CHANGE UP (ref 10–45)
ALT FLD-CCNC: 42 U/L — SIGNIFICANT CHANGE UP (ref 10–45)
ALT FLD-CCNC: 44 U/L — SIGNIFICANT CHANGE UP (ref 10–45)
ANION GAP SERPL CALC-SCNC: 11 MMOL/L — SIGNIFICANT CHANGE UP (ref 5–17)
ANION GAP SERPL CALC-SCNC: 14 MMOL/L — SIGNIFICANT CHANGE UP (ref 5–17)
ANION GAP SERPL CALC-SCNC: 17 MMOL/L — SIGNIFICANT CHANGE UP (ref 5–17)
AST SERPL-CCNC: 23 U/L — SIGNIFICANT CHANGE UP (ref 10–40)
AST SERPL-CCNC: 31 U/L — SIGNIFICANT CHANGE UP (ref 10–40)
AST SERPL-CCNC: 42 U/L — HIGH (ref 10–40)
BASE EXCESS BLDV CALC-SCNC: 5.1 MMOL/L — HIGH (ref -2–3)
BASOPHILS # BLD AUTO: 0.06 K/UL — SIGNIFICANT CHANGE UP (ref 0–0.2)
BASOPHILS NFR BLD AUTO: 0.4 % — SIGNIFICANT CHANGE UP (ref 0–2)
BILIRUB SERPL-MCNC: 0.4 MG/DL — SIGNIFICANT CHANGE UP (ref 0.2–1.2)
BILIRUB SERPL-MCNC: 0.4 MG/DL — SIGNIFICANT CHANGE UP (ref 0.2–1.2)
BILIRUB SERPL-MCNC: 0.5 MG/DL — SIGNIFICANT CHANGE UP (ref 0.2–1.2)
BUN SERPL-MCNC: 10 MG/DL — SIGNIFICANT CHANGE UP (ref 7–23)
BUN SERPL-MCNC: 10 MG/DL — SIGNIFICANT CHANGE UP (ref 7–23)
BUN SERPL-MCNC: 11 MG/DL — SIGNIFICANT CHANGE UP (ref 7–23)
CA-I SERPL-SCNC: 1.15 MMOL/L — SIGNIFICANT CHANGE UP (ref 1.15–1.33)
CALCIUM SERPL-MCNC: 8.8 MG/DL — SIGNIFICANT CHANGE UP (ref 8.4–10.5)
CALCIUM SERPL-MCNC: 8.9 MG/DL — SIGNIFICANT CHANGE UP (ref 8.4–10.5)
CALCIUM SERPL-MCNC: 9.1 MG/DL — SIGNIFICANT CHANGE UP (ref 8.4–10.5)
CHLORIDE BLDV-SCNC: 102 MMOL/L — SIGNIFICANT CHANGE UP (ref 96–108)
CHLORIDE SERPL-SCNC: 101 MMOL/L — SIGNIFICANT CHANGE UP (ref 96–108)
CHLORIDE SERPL-SCNC: 101 MMOL/L — SIGNIFICANT CHANGE UP (ref 96–108)
CHLORIDE SERPL-SCNC: 97 MMOL/L — SIGNIFICANT CHANGE UP (ref 96–108)
CO2 BLDV-SCNC: 30 MMOL/L — HIGH (ref 22–26)
CO2 SERPL-SCNC: 20 MMOL/L — LOW (ref 22–31)
CO2 SERPL-SCNC: 24 MMOL/L — SIGNIFICANT CHANGE UP (ref 22–31)
CO2 SERPL-SCNC: 28 MMOL/L — SIGNIFICANT CHANGE UP (ref 22–31)
CREAT SERPL-MCNC: 0.53 MG/DL — SIGNIFICANT CHANGE UP (ref 0.5–1.3)
CREAT SERPL-MCNC: 0.54 MG/DL — SIGNIFICANT CHANGE UP (ref 0.5–1.3)
CREAT SERPL-MCNC: 0.72 MG/DL — SIGNIFICANT CHANGE UP (ref 0.5–1.3)
CRP SERPL-MCNC: 232 MG/L — HIGH (ref 0–4)
EGFR: 107 ML/MIN/1.73M2 — SIGNIFICANT CHANGE UP
EGFR: 108 ML/MIN/1.73M2 — SIGNIFICANT CHANGE UP
EGFR: 97 ML/MIN/1.73M2 — SIGNIFICANT CHANGE UP
EOSINOPHIL # BLD AUTO: 0.04 K/UL — SIGNIFICANT CHANGE UP (ref 0–0.5)
EOSINOPHIL NFR BLD AUTO: 0.3 % — SIGNIFICANT CHANGE UP (ref 0–6)
FERRITIN SERPL-MCNC: 1449 NG/ML — HIGH (ref 15–150)
FOLATE SERPL-MCNC: >20 NG/ML — SIGNIFICANT CHANGE UP
GAS PNL BLDA: SIGNIFICANT CHANGE UP
GAS PNL BLDV: 136 MMOL/L — SIGNIFICANT CHANGE UP (ref 136–145)
GAS PNL BLDV: SIGNIFICANT CHANGE UP
GAS PNL BLDV: SIGNIFICANT CHANGE UP
GLUCOSE BLDC GLUCOMTR-MCNC: 260 MG/DL — HIGH (ref 70–99)
GLUCOSE BLDV-MCNC: 106 MG/DL — HIGH (ref 70–99)
GLUCOSE SERPL-MCNC: 111 MG/DL — HIGH (ref 70–99)
GLUCOSE SERPL-MCNC: 113 MG/DL — HIGH (ref 70–99)
GLUCOSE SERPL-MCNC: 310 MG/DL — HIGH (ref 70–99)
HAPTOGLOB SERPL-MCNC: 371 MG/DL — HIGH (ref 34–200)
HAPTOGLOB SERPL-MCNC: 457 MG/DL — HIGH (ref 34–200)
HCO3 BLDV-SCNC: 29 MMOL/L — SIGNIFICANT CHANGE UP (ref 22–29)
HCT VFR BLD CALC: 27.4 % — LOW (ref 34.5–45)
HCT VFR BLD CALC: 29.8 % — LOW (ref 34.5–45)
HCT VFR BLDA CALC: 27 % — LOW (ref 34.5–46.5)
HGB BLD CALC-MCNC: 9 G/DL — LOW (ref 11.7–16.1)
HGB BLD-MCNC: 8.8 G/DL — LOW (ref 11.5–15.5)
HGB BLD-MCNC: 9.2 G/DL — LOW (ref 11.5–15.5)
IMM GRANULOCYTES NFR BLD AUTO: 0.9 % — SIGNIFICANT CHANGE UP (ref 0–0.9)
IRON SATN MFR SERPL: 12 % — LOW (ref 14–50)
IRON SATN MFR SERPL: 20 UG/DL — LOW (ref 30–160)
IRON SATN MFR SERPL: 46 UG/DL — SIGNIFICANT CHANGE UP (ref 30–160)
IRON SATN MFR SERPL: SIGNIFICANT CHANGE UP % (ref 14–50)
LACTATE BLDV-MCNC: 1.3 MMOL/L — SIGNIFICANT CHANGE UP (ref 0.5–2)
LDH SERPL L TO P-CCNC: 216 U/L — SIGNIFICANT CHANGE UP (ref 50–242)
LYMPHOCYTES # BLD AUTO: 18.2 % — SIGNIFICANT CHANGE UP (ref 13–44)
LYMPHOCYTES # BLD AUTO: 2.54 K/UL — SIGNIFICANT CHANGE UP (ref 1–3.3)
MAGNESIUM SERPL-MCNC: 2 MG/DL — SIGNIFICANT CHANGE UP (ref 1.6–2.6)
MAGNESIUM SERPL-MCNC: 2.1 MG/DL — SIGNIFICANT CHANGE UP (ref 1.6–2.6)
MCHC RBC-ENTMCNC: 29.2 PG — SIGNIFICANT CHANGE UP (ref 27–34)
MCHC RBC-ENTMCNC: 29.3 PG — SIGNIFICANT CHANGE UP (ref 27–34)
MCHC RBC-ENTMCNC: 30.9 GM/DL — LOW (ref 32–36)
MCHC RBC-ENTMCNC: 32.1 GM/DL — SIGNIFICANT CHANGE UP (ref 32–36)
MCV RBC AUTO: 91 FL — SIGNIFICANT CHANGE UP (ref 80–100)
MCV RBC AUTO: 94.9 FL — SIGNIFICANT CHANGE UP (ref 80–100)
MONOCYTES # BLD AUTO: 0.79 K/UL — SIGNIFICANT CHANGE UP (ref 0–0.9)
MONOCYTES NFR BLD AUTO: 5.7 % — SIGNIFICANT CHANGE UP (ref 2–14)
MRSA PCR RESULT.: SIGNIFICANT CHANGE UP
NEUTROPHILS # BLD AUTO: 10.39 K/UL — HIGH (ref 1.8–7.4)
NEUTROPHILS NFR BLD AUTO: 74.5 % — SIGNIFICANT CHANGE UP (ref 43–77)
NRBC # BLD: 0 /100 WBCS — SIGNIFICANT CHANGE UP (ref 0–0)
NRBC # BLD: 0 /100 WBCS — SIGNIFICANT CHANGE UP (ref 0–0)
NT-PROBNP SERPL-SCNC: HIGH PG/ML (ref 0–300)
PCO2 BLDV: 39 MMHG — SIGNIFICANT CHANGE UP (ref 39–42)
PH BLDV: 7.48 — HIGH (ref 7.32–7.43)
PHOSPHATE SERPL-MCNC: 3.1 MG/DL — SIGNIFICANT CHANGE UP (ref 2.5–4.5)
PHOSPHATE SERPL-MCNC: 6.4 MG/DL — HIGH (ref 2.5–4.5)
PLATELET # BLD AUTO: 403 K/UL — HIGH (ref 150–400)
PLATELET # BLD AUTO: 567 K/UL — HIGH (ref 150–400)
PO2 BLDV: 96 MMHG — HIGH (ref 25–45)
POTASSIUM BLDV-SCNC: 3.3 MMOL/L — LOW (ref 3.5–5.1)
POTASSIUM SERPL-MCNC: 3.3 MMOL/L — LOW (ref 3.5–5.3)
POTASSIUM SERPL-MCNC: 3.9 MMOL/L — SIGNIFICANT CHANGE UP (ref 3.5–5.3)
POTASSIUM SERPL-MCNC: 4.8 MMOL/L — SIGNIFICANT CHANGE UP (ref 3.5–5.3)
POTASSIUM SERPL-SCNC: 3.3 MMOL/L — LOW (ref 3.5–5.3)
POTASSIUM SERPL-SCNC: 3.9 MMOL/L — SIGNIFICANT CHANGE UP (ref 3.5–5.3)
POTASSIUM SERPL-SCNC: 4.8 MMOL/L — SIGNIFICANT CHANGE UP (ref 3.5–5.3)
PROT SERPL-MCNC: 6.3 G/DL — SIGNIFICANT CHANGE UP (ref 6–8.3)
PROT SERPL-MCNC: 6.4 G/DL — SIGNIFICANT CHANGE UP (ref 6–8.3)
PROT SERPL-MCNC: 6.7 G/DL — SIGNIFICANT CHANGE UP (ref 6–8.3)
RBC # BLD: 3.01 M/UL — LOW (ref 3.8–5.2)
RBC # BLD: 3.01 M/UL — LOW (ref 3.8–5.2)
RBC # BLD: 3.14 M/UL — LOW (ref 3.8–5.2)
RBC # FLD: 13.6 % — SIGNIFICANT CHANGE UP (ref 10.3–14.5)
RBC # FLD: 13.9 % — SIGNIFICANT CHANGE UP (ref 10.3–14.5)
RETICS #: 64.1 K/UL — SIGNIFICANT CHANGE UP (ref 25–125)
RETICS/RBC NFR: 2.1 % — SIGNIFICANT CHANGE UP (ref 0.5–2.5)
S AUREUS DNA NOSE QL NAA+PROBE: SIGNIFICANT CHANGE UP
SAO2 % BLDV: 97.4 % — HIGH (ref 67–88)
SODIUM SERPL-SCNC: 134 MMOL/L — LOW (ref 135–145)
SODIUM SERPL-SCNC: 139 MMOL/L — SIGNIFICANT CHANGE UP (ref 135–145)
SODIUM SERPL-SCNC: 140 MMOL/L — SIGNIFICANT CHANGE UP (ref 135–145)
TIBC SERPL-MCNC: 171 UG/DL — LOW (ref 220–430)
TIBC SERPL-MCNC: SIGNIFICANT CHANGE UP UG/DL (ref 220–430)
UIBC SERPL-MCNC: 151 UG/DL — SIGNIFICANT CHANGE UP (ref 110–370)
UIBC SERPL-MCNC: 99 UG/DL — LOW (ref 110–370)
VIT B12 SERPL-MCNC: 1029 PG/ML — SIGNIFICANT CHANGE UP (ref 232–1245)
WBC # BLD: 13.95 K/UL — HIGH (ref 3.8–10.5)
WBC # BLD: 22.88 K/UL — HIGH (ref 3.8–10.5)
WBC # FLD AUTO: 13.95 K/UL — HIGH (ref 3.8–10.5)
WBC # FLD AUTO: 22.88 K/UL — HIGH (ref 3.8–10.5)

## 2022-10-15 PROCEDURE — 93010 ELECTROCARDIOGRAM REPORT: CPT

## 2022-10-15 PROCEDURE — 71045 X-RAY EXAM CHEST 1 VIEW: CPT | Mod: 26,76

## 2022-10-15 PROCEDURE — 99233 SBSQ HOSP IP/OBS HIGH 50: CPT | Mod: GC

## 2022-10-15 PROCEDURE — 99254 IP/OBS CNSLTJ NEW/EST MOD 60: CPT | Mod: 25,GC

## 2022-10-15 RX ORDER — METOPROLOL TARTRATE 50 MG
5 TABLET ORAL EVERY 6 HOURS
Refills: 0 | Status: DISCONTINUED | OUTPATIENT
Start: 2022-10-15 | End: 2022-10-17

## 2022-10-15 RX ORDER — POTASSIUM CHLORIDE 20 MEQ
10 PACKET (EA) ORAL
Refills: 0 | Status: COMPLETED | OUTPATIENT
Start: 2022-10-15 | End: 2022-10-15

## 2022-10-15 RX ORDER — ACETAMINOPHEN 500 MG
650 TABLET ORAL EVERY 6 HOURS
Refills: 0 | Status: DISCONTINUED | OUTPATIENT
Start: 2022-10-15 | End: 2022-10-19

## 2022-10-15 RX ORDER — FUROSEMIDE 40 MG
40 TABLET ORAL ONCE
Refills: 0 | Status: COMPLETED | OUTPATIENT
Start: 2022-10-15 | End: 2022-10-15

## 2022-10-15 RX ADMIN — PIPERACILLIN AND TAZOBACTAM 25 GRAM(S): 4; .5 INJECTION, POWDER, LYOPHILIZED, FOR SOLUTION INTRAVENOUS at 06:49

## 2022-10-15 RX ADMIN — Medication 5 MILLIGRAM(S): at 11:37

## 2022-10-15 RX ADMIN — Medication 40 MILLIGRAM(S): at 11:37

## 2022-10-15 RX ADMIN — PIPERACILLIN AND TAZOBACTAM 25 GRAM(S): 4; .5 INJECTION, POWDER, LYOPHILIZED, FOR SOLUTION INTRAVENOUS at 23:12

## 2022-10-15 RX ADMIN — Medication 5 MILLIGRAM(S): at 17:17

## 2022-10-15 RX ADMIN — Medication 30 MILLIGRAM(S): at 17:17

## 2022-10-15 RX ADMIN — PIPERACILLIN AND TAZOBACTAM 25 GRAM(S): 4; .5 INJECTION, POWDER, LYOPHILIZED, FOR SOLUTION INTRAVENOUS at 13:22

## 2022-10-15 RX ADMIN — Medication 100 MILLIEQUIVALENT(S): at 15:41

## 2022-10-15 RX ADMIN — Medication 100 MILLIEQUIVALENT(S): at 13:21

## 2022-10-15 RX ADMIN — CHLORHEXIDINE GLUCONATE 1 APPLICATION(S): 213 SOLUTION TOPICAL at 06:50

## 2022-10-15 RX ADMIN — Medication 100 MILLIEQUIVALENT(S): at 14:15

## 2022-10-15 RX ADMIN — ENOXAPARIN SODIUM 40 MILLIGRAM(S): 100 INJECTION SUBCUTANEOUS at 06:48

## 2022-10-15 RX ADMIN — Medication 20 MILLIGRAM(S): at 01:18

## 2022-10-15 NOTE — PROGRESS NOTE ADULT - PROBLEM SELECTOR PLAN 7
DVT ppx: Lovenox  Diet: Regular with Aspiration precautions  Dispo: Pending clinical course  Directives: Full code On leflunomide as outpatient.  -Hold currently in setting of infection

## 2022-10-15 NOTE — PROGRESS NOTE ADULT - PROBLEM SELECTOR PLAN 1
Sepsis 2/2 suspected viral syndrome c/b pna vs aspiration event. Complicated by acute hypoxic respiratory failure requiring HFNC now on Bipap    -c/w Zosyn (atypical organisms covered outpatient)  -c/w O2 supplementation, wean as tolerated  -Incentive Spirometer  -F/u Blood and urine cx Currently on Bipap 14/8 with prominent tachypnea and tachycardia, CTAB, no stridor    -c/w Bipap today Currently on Bipap 14/8 with prominent tachypnea and tachycardia, CTAB, no stridor    -c/w Bipap today  - methylprednisolone and lasix started given concern for either auto-immune component to respiratory distress or related to pulmonary oedema   - no role for methimazole and PTU currently, hyperthyroidism induced tachypnea not consistent with hypoxia. Although elevated thyroid hormone can cause an acute pulmonary hypertension, which would not present with oedema on imaging of lungs. Can consider if extreme circumstance

## 2022-10-15 NOTE — RAPID RESPONSE TEAM SUMMARY - NSSITUATIONBACKGROUNDRRT_GEN_ALL_CORE
56F with hx of HTN, RA on leflunomide, anxiety who p/w fever, chills, cough, n/v, decreased po intake. Patient initially admitted to MICU for airway monitoring in setting of hypoxic respiratory failure and septic shock requiring pressors. In the ICU, patient was weaned off pressors, remained hemodynamically stable. Zosyn was started for RADHA PNA, CT Neck found diffuse thyroiditis. RRT called for hypoxia to 87%. On arrival patient awake, mentating, and following commands. Per primary nurse, patient just used the commode and started desaturating after returning to bed. Pt on high flow NC at 100% FiO2 and 50 LPM with overlying NRB. Patient tachypneic and tachycardic to HR 130s. Denies any pain. BP 130s/70s. T 98.3. . Full set of labs obtained including ABG. Another set of blood cultures obtained. IV Lasix 20mg just given prior to RRT. Patient switched to bipap given increased work of breathing with improved saturations to 93%. CXR obtained. Suspect hypoxic resp failure 2/2 to known pna vs mucus plugging vs pulm edema. Recent CTA neg for PE. Patient to remain on unit with telemetry and continuous pulse ox while on bipap.  56F with hx of HTN, RA on leflunomide, anxiety who p/w fever, chills, cough, n/v, decreased po intake. Patient initially admitted to MICU for airway monitoring in setting of hypoxic respiratory failure and septic shock requiring pressors. In the ICU, patient was weaned off pressors, remained hemodynamically stable. Zosyn was started for RADHA PNA, CT Neck found diffuse thyroiditis. RRT called for hypoxia to 87%. On arrival patient awake, mentating, and following commands. Per primary nurse, patient just used the commode and started desaturating after returning to bed. Pt on high flow NC at 100% FiO2 and 50 LPM with overlying NRB. Patient tachypneic and tachycardic to HR 130s. Denies any pain. BP 130s/70s. T 98.3. . Full set of labs obtained including ABG. Another set of blood cultures obtained. IV Lasix 20mg just given prior to RRT. Patient switched to bipap given increased work of breathing with improved saturations to 93%. CXR obtained. Suspect hypoxic resp failure 2/2 to known pna vs pulm edema. Recent CTA neg for PE. Patient to remain on unit with telemetry and continuous pulse ox while on bipap.

## 2022-10-15 NOTE — PROGRESS NOTE ADULT - SUBJECTIVE AND OBJECTIVE BOX
INCOMPLETE NOTE    Iván Burroughsyolandagriselda | PGY1| Pager: [phone]  Interval Events: started on bipap during rrt yesterday.     REVIEW OF SYSTEMS:  CONSTITUTIONAL: No weakness, fevers or chills  EYES/ENT: No visual changes;  No vertigo or throat pain   NECK: No pain or stiffness  RESPIRATORY: No cough, wheezing, hemoptysis; No shortness of breath  CARDIOVASCULAR: No chest pain or palpitations  GASTROINTESTINAL: No abdominal or epigastric pain. No nausea, vomiting, or hematemesis; No diarrhea or constipation. No melena or hematochezia.  GENITOURINARY: No dysuria, frequency or hematuria  NEUROLOGICAL: No numbness or weakness  SKIN: No itching, burning, rashes, or lesions   All other review of systems is negative unless indicated above.    OBJECTIVE:  ICU Vital Signs Last 24 Hrs  T(C): 37.7 (15 Oct 2022 01:00), Max: 37.7 (15 Oct 2022 01:00)  T(F): 99.9 (15 Oct 2022 01:00), Max: 99.9 (15 Oct 2022 01:00)  HR: 108 (15 Oct 2022 05:57) (97 - 134)  BP: 169/111 (15 Oct 2022 01:00) (112/76 - 169/111)  BP(mean): 81 (14 Oct 2022 12:00) (81 - 109)  ABP: --  ABP(mean): --  RR: 24 (15 Oct 2022 01:00) (22 - 44)  SpO2: 96% (15 Oct 2022 05:57) (80% - 100%)    O2 Parameters below as of 15 Oct 2022 01:00  Patient On (Oxygen Delivery Method): nasal cannula, high flow  O2 Flow (L/min): 50  O2 Concentration (%): 100          10-14 @ 07:01  -  10-15 @ 07:00  --------------------------------------------------------  IN: 530 mL / OUT: 900 mL / NET: -370 mL      CAPILLARY BLOOD GLUCOSE      POCT Blood Glucose.: 260 mg/dL (15 Oct 2022 01:34)      PHYSICAL EXAM:  General: WN/WD NAD  Neurology: A&Ox3, nonfocal, HERNANDEZ x 4  Eyes: PERRLA/ EOMI, Gross vision intact  ENT/Neck: Neck supple, trachea midline, No JVD, Gross hearing intact  Respiratory: CTA B/L, No wheezing, rales, rhonchi  CV: RRR, +S1/S2, -S3/S4, no murmurs, rubs or gallops  Abdominal: Soft, NT, ND +BS, No HSM  MSK: 5/5 strength UE/LE bilaterally  Extremities: No edema, 2+ peripheral pulses  Skin: No Rashes, Hematoma, Ecchymosis  Incisions:   Tubes:    HOSPITAL MEDICATIONS:  MEDICATIONS  (STANDING):  chlorhexidine 4% Liquid 1 Application(s) Topical <User Schedule>  enoxaparin Injectable 40 milliGRAM(s) SubCutaneous every 24 hours  metoprolol tartrate Injectable 5 milliGRAM(s) IV Push every 6 hours  pantoprazole    Tablet 40 milliGRAM(s) Oral before breakfast  PARoxetine 20 milliGRAM(s) Oral daily  piperacillin/tazobactam IVPB.. 3.375 Gram(s) IV Intermittent every 8 hours  piperacillin/tazobactam IVPB.. 3.375 Gram(s) IV Intermittent once    MEDICATIONS  (PRN):  melatonin 3 milliGRAM(s) Oral at bedtime PRN Insomnia      LABS:                        9.2    22.88 )-----------( 567      ( 15 Oct 2022 02:04 )             29.8     Hgb Trend: 9.2<--, 7.5<--, 7.0<--, 6.9<--, 8.5<--  10-15    134<L>  |  97  |  10  ----------------------------<  310<H>  4.8   |  20<L>  |  0.72    Ca    9.1      15 Oct 2022 02:04  Phos  6.4     10-15  Mg     2.1     10-15    TPro  6.7  /  Alb  2.9<L>  /  TBili  0.4  /  DBili  x   /  AST  31  /  ALT  44  /  AlkPhos  163<H>  10-15    Creatinine Trend: 0.72<--, 0.53<--, 0.54<--, 0.82<--  PT/INR - ( 13 Oct 2022 08:17 )   PT: 17.6 sec;   INR: 1.51 ratio         PTT - ( 13 Oct 2022 08:17 )  PTT:27.6 sec  Urinalysis Basic - ( 13 Oct 2022 10:48 )    Color: Colorless / Appearance: Clear / S.006 / pH: x  Gluc: x / Ketone: Trace  / Bili: Negative / Urobili: Negative   Blood: x / Protein: Negative / Nitrite: Negative   Leuk Esterase: Negative / RBC: x / WBC x   Sq Epi: x / Non Sq Epi: x / Bacteria: x      Arterial Blood Gas:  10-15 @ 04:02  7.48/38/162/28/98.2/4.5  ABG lactate: --  Arterial Blood Gas:  10-15 @ 01:48  7.34/37/69/20/93.6/-5.3  ABG lactate: --    Venous Blood Gas:  10-13 @ 23:09  7.41/43/39/27/64.7  VBG Lactate: 1.0  Venous Blood Gas:  10-13 @ 09:51  7.42/42/45/27/74.9  VBG Lactate: 1.0  Venous Blood Gas:  10-13 @ 07:25  7.47/41/46/30/79.2  VBG Lactate: 1.9      MICROBIOLOGY:     Culture - Urine (collected 13 Oct 2022 10:48)  Source: Clean Catch Clean Catch (Midstream)  Final Report (14 Oct 2022 13:50):    No growth    Culture - Blood (collected 13 Oct 2022 07:30)  Source: .Blood Blood-Peripheral  Preliminary Report (14 Oct 2022 16:01):    No growth to date.    Culture - Blood (collected 13 Oct 2022 07:25)  Source: .Blood Blood-Peripheral  Preliminary Report (14 Oct 2022 16:01):    No growth to date.       Iván Burroughscheryl | PGY1| Pager: [phone]  Interval Events: started on bipap during rrt yesterday. today tolerating bipap well, tachypneic though more comfortable in appearance, No CP/SOB, fevers, chil    OBJECTIVE:  ICU Vital Signs Last 24 Hrs  T(C): 37.7 (15 Oct 2022 01:00), Max: 37.7 (15 Oct 2022 01:00)  T(F): 99.9 (15 Oct 2022 01:00), Max: 99.9 (15 Oct 2022 01:00)  HR: 108 (15 Oct 2022 05:57) (97 - 134)  BP: 169/111 (15 Oct 2022 01:00) (112/76 - 169/111)  BP(mean): 81 (14 Oct 2022 12:00) (81 - 109)  ABP: --  ABP(mean): --  RR: 24 (15 Oct 2022 01:00) (22 - 44)  SpO2: 96% (15 Oct 2022 05:57) (80% - 100%)    O2 Parameters below as of 15 Oct 2022 01:00  Patient On (Oxygen Delivery Method): nasal cannula, high flow  O2 Flow (L/min): 50  O2 Concentration (%): 100      10-14 @ 07:01  -  10-15 @ 07:00  --------------------------------------------------------  IN: 530 mL / OUT: 900 mL / NET: -370 mL      CAPILLARY BLOOD GLUCOSE      POCT Blood Glucose.: 260 mg/dL (15 Oct 2022 01:34)      PHYSICAL EXAM:  General: WN/WD NAD  Neurology: A&Ox3, nonfocal, HERNANDEZ x 4  Eyes: PERRLA/ EOMI, Gross vision intact  ENT/Neck: Neck supple, trachea midline, No JVD, Gross hearing intact  Respiratory: CTA B/L, No wheezing, rales, rhonchi  CV: RRR, +S1/S2, -S3/S4, no murmurs, rubs or gallops  Abdominal: Soft, NT, ND +BS, No HSM  MSK: 5/5 strength UE/LE bilaterally  Extremities: No edema, 2+ peripheral pulses  Skin: No Rashes, Hematoma, Ecchymosis  Incisions:   Tubes:    HOSPITAL MEDICATIONS:  MEDICATIONS  (STANDING):  chlorhexidine 4% Liquid 1 Application(s) Topical <User Schedule>  enoxaparin Injectable 40 milliGRAM(s) SubCutaneous every 24 hours  metoprolol tartrate Injectable 5 milliGRAM(s) IV Push every 6 hours  pantoprazole    Tablet 40 milliGRAM(s) Oral before breakfast  PARoxetine 20 milliGRAM(s) Oral daily  piperacillin/tazobactam IVPB.. 3.375 Gram(s) IV Intermittent every 8 hours  piperacillin/tazobactam IVPB.. 3.375 Gram(s) IV Intermittent once    MEDICATIONS  (PRN):  melatonin 3 milliGRAM(s) Oral at bedtime PRN Insomnia      LABS:                        9.2    22.88 )-----------( 567      ( 15 Oct 2022 02:04 )             29.8     Hgb Trend: 9.2<--, 7.5<--, 7.0<--, 6.9<--, 8.5<--  10-15    134<L>  |  97  |  10  ----------------------------<  310<H>  4.8   |  20<L>  |  0.72    Ca    9.1      15 Oct 2022 02:04  Phos  6.4     10-15  Mg     2.1     10-15    TPro  6.7  /  Alb  2.9<L>  /  TBili  0.4  /  DBili  x   /  AST  31  /  ALT  44  /  AlkPhos  163<H>  10-15    Creatinine Trend: 0.72<--, 0.53<--, 0.54<--, 0.82<--  PT/INR - ( 13 Oct 2022 08:17 )   PT: 17.6 sec;   INR: 1.51 ratio         PTT - ( 13 Oct 2022 08:17 )  PTT:27.6 sec  Urinalysis Basic - ( 13 Oct 2022 10:48 )    Color: Colorless / Appearance: Clear / S.006 / pH: x  Gluc: x / Ketone: Trace  / Bili: Negative / Urobili: Negative   Blood: x / Protein: Negative / Nitrite: Negative   Leuk Esterase: Negative / RBC: x / WBC x   Sq Epi: x / Non Sq Epi: x / Bacteria: x      Arterial Blood Gas:  10-15 @ 04:02  7.48/38/162/28/98.2/4.5  ABG lactate: --  Arterial Blood Gas:  10-15 @ 01:48  7.34/37/69/20/93.6/-5.3  ABG lactate: --    Venous Blood Gas:  10-13 @ 23:09  7.41/43/39/27/64.7  VBG Lactate: 1.0  Venous Blood Gas:  10-13 @ 09:51  7.42/42/45/27/74.9  VBG Lactate: 1.0  Venous Blood Gas:  10-13 @ 07:25  7.47/41/46/30/79.2  VBG Lactate: 1.9      MICROBIOLOGY:     Culture - Urine (collected 13 Oct 2022 10:48)  Source: Clean Catch Clean Catch (Midstream)  Final Report (14 Oct 2022 13:50):    No growth    Culture - Blood (collected 13 Oct 2022 07:30)  Source: .Blood Blood-Peripheral  Preliminary Report (14 Oct 2022 16:01):    No growth to date.    Culture - Blood (collected 13 Oct 2022 07:25)  Source: .Blood Blood-Peripheral  Preliminary Report (14 Oct 2022 16:01):    No growth to date.

## 2022-10-15 NOTE — PROGRESS NOTE ADULT - PROBLEM SELECTOR PLAN 5
Unclear etiology at this time.  - Check QTC  - If QTC appropriate may use zofran as needed K+ 2.7 on latest BMP; Phos 1.8 as well; likely in s/o recurrent vomiting rpt K 4.0    - Replete lytes as necessary

## 2022-10-15 NOTE — PROGRESS NOTE ADULT - SUBJECTIVE AND OBJECTIVE BOX
Chief Complaint: Neck pain    History: No palpitations or CP. No heat intolerance. Has "a bit" of anterior neck pain. Had 3 episodes of diarrhea last night. No vomiting. Having SOB. Had RRT called last night, now on bipap. CXR showed progression/worsening of pulmonary edema and new, small, bilateral pleural effusions.    MEDICATIONS  (STANDING):  chlorhexidine 4% Liquid 1 Application(s) Topical <User Schedule>  enoxaparin Injectable 40 milliGRAM(s) SubCutaneous every 24 hours  methylPREDNISolone sodium succinate Injectable 30 milliGRAM(s) IV Push every 12 hours  metoprolol tartrate Injectable 5 milliGRAM(s) IV Push every 6 hours  pantoprazole    Tablet 40 milliGRAM(s) Oral before breakfast  PARoxetine 20 milliGRAM(s) Oral daily  piperacillin/tazobactam IVPB.. 3.375 Gram(s) IV Intermittent every 8 hours  piperacillin/tazobactam IVPB.. 3.375 Gram(s) IV Intermittent once  potassium chloride  10 mEq/100 mL IVPB 10 milliEquivalent(s) IV Intermittent every 1 hour    MEDICATIONS  (PRN):  acetaminophen     Tablet .. 650 milliGRAM(s) Oral every 6 hours PRN Temp greater or equal to 38C (100.4F), Mild Pain (1 - 3)  melatonin 3 milliGRAM(s) Oral at bedtime PRN Insomnia      PHYSICAL EXAM:  VITALS: T(C): 37.1 (10-15-22 @ 12:09)  T(F): 98.8 (10-15-22 @ 12:09), Max: 99.9 (10-15-22 @ 01:00)  HR: 104 (10-15-22 @ 12:09) (104 - 134)  BP: 114/67 (10-15-22 @ 12:09) (112/76 - 169/111)  RR:  (20 - 44)  SpO2:  (80% - 98%)  Wt(kg): --  General: Well-developed female, No acute distress, Speaking full sentences.   Eye:  Extraocular movements are intact, No proptosis or lid lag, No scleral icterus.   HEENT: Minimal thyroid tenderness to palpation.  Respiratory:  Respirations are non-labored, Symmetric chest wall expansion.  Cardiovascular:  Borderline tachycardic, Regular rhythm, No edema.  Gastrointestinal:  Soft, Non-tender, Non-distended.   Integumentary:  Skin cool, dry.    POCT Blood Glucose.: 260 mg/dL (10-15-22 @ 01:34)      10-15    140  |  101  |  11  ----------------------------<  111<H>  3.3<L>   |  28  |  0.54    eGFR: 107    Ca    8.9      10-15  Mg     2.0     10-15  Phos  3.1     10-15    TPro  6.4  /  Alb  2.8<L>  /  TBili  0.5  /  DBili  x   /  AST  23  /  ALT  39  /  AlkPhos  140<H>  10-15          Thyroid Function Tests:  10-13 @ 14:39 TSH -- FreeT4 4.8 T3 -- Anti TPO -- Anti Thyroglobulin Ab -- TSI --  10-13 @ 08:17 TSH 0.02 FreeT4 -- T3 286 Anti TPO -- Anti Thyroglobulin Ab -- TSI --

## 2022-10-15 NOTE — PROGRESS NOTE ADULT - PROBLEM SELECTOR PLAN 8
DVT ppx: Lovenox  Diet: Regular with Aspiration precautions  Dispo: Pending clinical course  Directives: Full code

## 2022-10-15 NOTE — PROVIDER CONTACT NOTE (OTHER) - ASSESSMENT
A&Ox4. Tachycardic. 91% on high flow nasal cannula. Denies pain/discomfort/symptoms associated with HTn
Patient is A&Ox4, tachy and tachypneic
A&Ox4. Tachycardic on telemetry. RR  40. O2 83% on high flow NC. Pt pale and reports feeling very SOB

## 2022-10-15 NOTE — PROGRESS NOTE ADULT - ASSESSMENT
55 y/o F w/ thyrotoxicosis likely secondary to acute thyroiditis    Thyrotoxicosis due to acute thyroiditis  Pt with low TSH and elevated Free T4 in the setting of subacute symptoms of hyperthyroidism over the past 2 weeks s/p sore throat and EGD with associated neck pain with CT revealing thyroiditis.  Recommend increase dose of propranolol for goal HR 70-80.  NSAIDs to decrease inflammation  Depending on how she responds can consider course of prednisone.  Check Thyroid US  Check TSH Receptor Ab and TSI    Thyroid nodule  Check Thyroid US, has 0.9 cm LLP nodule on CT    Tereso Shaikh DO, Endocrinology Fellow  For follow-up questions, discharge recommendations, or new consults please call answering service at [phone] (weekdays), [phone] (nights/weekends). For nonurgent matters, please email [email or web address] or [email or web address]. 57 y/o F w/ thyrotoxicosis likely secondary to acute thyroiditis    Thyrotoxicosis due to acute thyroiditis  Pt with low TSH and elevated Free T4 in the setting of subacute symptoms of hyperthyroidism over the past 2 weeks s/p sore throat and EGD with associated neck pain with CT revealing thyroiditis.  Recommend increase dose of propranolol for goal HR 70-80.  NSAIDs to decrease inflammation  Depending on how she responds can consider course of prednisone.  Check Thyroid US  Check TSH Receptor Ab and TSI    Thyroid nodule  Check Thyroid US, has 0.9 cm LLP nodule on CT    Hyperglycemia, likely steroid-induced  Check A1c  FSBG qac and qhs  Low dose Admelog correction scale qac and separate low dose Admelog correction scale qhs if needed  Goal -180    Tereso Shaikh DO, Endocrinology Fellow  For follow-up questions, discharge recommendations, or new consults please call answering service at [phone] (weekdays), [phone] (nights/weekends). For nonurgent matters, please email [email or web address] or [email or web address].

## 2022-10-15 NOTE — PROGRESS NOTE ADULT - ATTENDING COMMENTS
57 Y/O/F with PMHx HTN, RA (on Leflunomide for >1yr), GERD, Anxiety who was initially admitted to MICU with septic shock and hypoxic resp failure and found to have suspected aspiration pneumonia s/p Rx with Levophed and now off.     Acute Hypoxic respirator failure  Continue Zosyn   Patient now requiring bipap due to worsening hypoxia. W  Will start patient on solumedrol and will give a dose of lasix for diuresis given worsening pulmonary infiltrates    Thyrotoxicosis due to acute thyroiditis  Pt with low TSH and elevated Free T4 in the setting of subacute symptoms of hyperthyroidism over the past 2 weeks s/p sore throat and EGD with associated neck pain with CT revealing thyroiditis.  endo eval appreciated -->  Recommend increase dose of propranolol for goal HR 70-80.  NSAIDs to decrease inflammation  Depending on how she responds can consider course of prednisone.  Check Thyroid US  Check TSH Receptor Ab and TSI

## 2022-10-15 NOTE — RAPID RESPONSE TEAM SUMMARY - NSADDTLFINDINGSRRT_GEN_ALL_CORE
ABG showing elevated lactate to 5.8. No hypercarbia, normal CO2.   CXR showing worsening opacities B/L

## 2022-10-15 NOTE — PROGRESS NOTE ADULT - PROBLEM SELECTOR PLAN 4
K+ 2.7 on latest BMP; Phos 1.8 as well; likely in s/o recurrent vomiting rpt K 4.0    - Replete lytes as necessary admit hgb 8.5; downtrended this AM to 6.9/7 on repeat  - Check labs for hemolysis, ELVIN and vitamin deficiencies  - Continue to trend  - no s/s of active bleeding  - Maintain active T&S

## 2022-10-15 NOTE — RAPID RESPONSE TEAM SUMMARY - NSOTHERINTERVENTIONSRRT_GEN_ALL_CORE
Bipap   CXR  ABG, blood cultures, CMP, CBC    Recommendations:  - Continue bipap at current settings  - Repeat ABG with lactate in 1 hour   - Monitor UOP, consider another dose of IV lasix 20mg if inadequate response  - Monitor on telemetry and continuous pulse ox

## 2022-10-15 NOTE — CONSULT NOTE ADULT - SUBJECTIVE AND OBJECTIVE BOX
73yoM with PMH of Crohn's disease, IDDM, neuropathy, HTN, HLD, BPH, CVA on ASA 81mg daily who presented with bloody diarrhea, concerning for CD flare.     Impression:   #IBD flare: Bloody diarrhea, concerning for CD flare -- unclear CD vs. UC as per Dr. Oro note. s/p poor prior response to Entyvio. On remicade for the past one year, last infusion 2 weeks.  On 6/30/22 pts remicade level was <0.4 and antibody was elevated at 308. Remicade dosage was increased since aug 2022. Last colonoscopy in 7/2020 showed inflammation from the anus to the descending colon (left sided colitis) and diverticulosis.   - GI PCR negative, c diff test pending.   - ESR and CRP elevated.   - On IV steroids (9/28 -   - IFX 10mg/kg on 9/30 + MTX    - will proceed with colonoscopy tomorrow.     Recommendations:   - Continue with IV Solumedrol 20mg Q8hrs.   - Monitor symptoms after IFX + MTX on 9/30  - follow up path from colonoscopy  - advance to low residue diet  - obtain CRP and ESR daily.   - f/u fecal protectin  - Place him on DVT PPx as IBD patients are high risk for thrombosis.     GI will continue to follow.     All recommendations are tentative until note is attested by an attending.       Thank you for involving us in the care of this patient, please reach out if any further questions.     Laron Dumas MD  Gastroenterology/Hepatology Fellow, PGY6    Available on [employer] Teams  [phone] ([facility])  [id number] ([facility])  Please contact on call fellow weekdays after 5pm-7am and weekends: [phone]     HPI:  Sanna Allen is a 56F with hx of HTN, RA on leflunomide, anxiety who p/w fever, chills, cough, n/v, decreased po intake. Also was hypoxic at home.  Patient accepted to MICU for airway monitoring and protection. Suspected to have sepsis 2/2 multilobal pneumonia possibly from viral infection. In the ICU, patient was weaned off pressors, remained hemodynamically stable. Zosyn was started for RADHA PNA, CT Neck found diffuse thyroiditis.    Pulmonary consulted for worsening oxygen requirements and increased opacities on cxr. Pt placed on bipap and given diuretics. Respiratory status improved with diuretics.      PAST MEDICAL & SURGICAL HISTORY:  Hypertension      Rheumatoid arthritis          FAMILY HISTORY:      SOCIAL HISTORY:  Smoking: [ ] Never Smoked [ ] Former Smoker (__ packs x ___ years) [ ] Current Smoker  (__ packs x ___ years)  Substance Use: [ ] Never Used [ ] Used ____  EtOH Use:  Marital Status: [ ] Single [ ]  [ ]  [ ]   Sexual History:   Occupation:  Recent Travel:  Country of Birth:  Advance Directives:    Allergies    No Known Allergies    Intolerances        HOME MEDICATIONS:  Home Medications:  amLODIPine 5 mg oral tablet: 1 tab(s) orally once a day (14 Oct 2022 16:56)  leflunomide 20 mg oral tablet: 1 tab(s) orally once a day (14 Oct 2022 16:56)  olmesartan-hydrochlorothiazide 20 mg-12.5 mg oral tablet: 1 tab(s) orally once a day (14 Oct 2022 16:56)  Paxil 20 mg oral tablet: 1 tab(s) orally once a day (14 Oct 2022 16:56)  propranolol 40 mg oral tablet: 1 tab(s) orally 2 times a day (14 Oct 2022 16:56)      REVIEW OF SYSTEMS:  CONSTITUTIONAL: +fever, chills, night sweats,   EYES: No eye pain, visual disturbances, or discharge  ENMT:  No difficulty hearing, tinnitus, vertigo; No sinus or throat pain  NECK: +pain, no stiffness  RESPIRATORY: No cough, wheezing, or hemoptysis; +shortness of breath  CARDIOVASCULAR: No chest pain, palpitations, dizziness, or leg swelling  GASTROINTESTINAL: No abdominal or epigastric pain. +nausea, vomiting, no hematemesis; No diarrhea or constipation. No melena or hematochezia.  GENITOURINARY: No dysuria, frequency, hematuria, or incontinence  NEUROLOGICAL: No headaches, memory loss, loss of strength, numbness, or tremors  SKIN: No itching, burning, rashes, or lesions   LYMPH NODES: No enlarged glands  ENDOCRINE: No heat or cold intolerance; No hair loss  MUSCULOSKELETAL: No joint pain or swelling; No muscle, back, or extremity pain  PSYCHIATRIC: No depression, anxiety, mood swings, or difficulty sleeping  HEME/LYMPH: No easy bruising, or bleeding gums  ALLERGY AND IMMUNOLOGIC: No hives or eczema  [x] All other systems negative  [ ] Unable to assess ROS because ________    OBJECTIVE:  ICU Vital Signs Last 24 Hrs  T(C): 37.1 (15 Oct 2022 12:09), Max: 37.7 (15 Oct 2022 01:00)  T(F): 98.8 (15 Oct 2022 12:09), Max: 99.9 (15 Oct 2022 01:00)  HR: 110 (15 Oct 2022 22:30) (100 - 134)  BP: 114/67 (15 Oct 2022 12:09) (114/67 - 169/111)  BP(mean): --  ABP: --  ABP(mean): --  RR: 20 (15 Oct 2022 12:09) (20 - 24)  SpO2: 96% (15 Oct 2022 22:30) (94% - 96%)    O2 Parameters below as of 15 Oct 2022 12:09  Patient On (Oxygen Delivery Method): nasal cannula, high flow  O2 Flow (L/min): 50  O2 Concentration (%): 100          10-14 @ 07:01  -  10-15 @ 07:00  --------------------------------------------------------  IN: 530 mL / OUT: 900 mL / NET: -370 mL    10-15 @ 07:01  -  10-15 @ 23:13  --------------------------------------------------------  IN: 400 mL / OUT: 400 mL / NET: 0 mL      CAPILLARY BLOOD GLUCOSE      POCT Blood Glucose.: 260 mg/dL (15 Oct 2022 01:34)      PHYSICAL EXAM:  GENERAL: NAD, well-groomed, well-developed  HEAD:  Atraumatic, Normocephalic  EYES: EOMI, conjunctiva and sclera clear  ENMT: Moist mucous membranes  CHEST/LUNG: Clear to auscultation bilaterally; No rales, rhonchi, wheezing, or rubs  HEART: Regular rate and rhythm; No murmurs, rubs, or gallops  ABDOMEN: Nondistended  VASCULAR: No  cyanosis, or edema  SKIN: No rashes or lesions  NERVOUS SYSTEM:  Alert & Oriented X3, Good concentration    HOSPITAL MEDICATIONS:  Standing Meds:  chlorhexidine 4% Liquid 1 Application(s) Topical <User Schedule>  enoxaparin Injectable 40 milliGRAM(s) SubCutaneous every 24 hours  methylPREDNISolone sodium succinate Injectable 30 milliGRAM(s) IV Push every 12 hours  metoprolol tartrate Injectable 5 milliGRAM(s) IV Push every 6 hours  pantoprazole    Tablet 40 milliGRAM(s) Oral before breakfast  PARoxetine 20 milliGRAM(s) Oral daily  piperacillin/tazobactam IVPB.. 3.375 Gram(s) IV Intermittent every 8 hours  piperacillin/tazobactam IVPB.. 3.375 Gram(s) IV Intermittent once      PRN Meds:  acetaminophen     Tablet .. 650 milliGRAM(s) Oral every 6 hours PRN  melatonin 3 milliGRAM(s) Oral at bedtime PRN      LABS:    10-15    140  |  101  |  11  ----------------------------<  111<H>  3.3<L>   |  28  |  0.54  10-15    139  |  101  |  10  ----------------------------<  113<H>  3.9   |  24  |  0.53  10-15    134<L>  |  97  |  10  ----------------------------<  310<H>  4.8   |  20<L>  |  0.72    Ca    8.9      15 Oct 2022 11:25  Ca    8.8      15 Oct 2022 08:25  Ca    9.1      15 Oct 2022 02:04  Phos  3.1     10-15  Mg     2.0     10-15    TPro  6.4  /  Alb  2.8<L>  /  TBili  0.5  /  DBili  x   /  AST  23  /  ALT  39  /  AlkPhos  140<H>  10-15  TPro  6.3  /  Alb  2.3<L>  /  TBili  0.4  /  DBili  x   /  AST  42<H>  /  ALT  42  /  AlkPhos  149<H>  10-15  TPro  6.7  /  Alb  2.9<L>  /  TBili  0.4  /  DBili  x   /  AST  31  /  ALT  44  /  AlkPhos  163<H>  10-15    Magnesium, Serum: 2.0 mg/dL (10-15-22 @ 11:25)  Magnesium, Serum: 2.1 mg/dL (10-15-22 @ 02:04)  Magnesium, Serum: 2.0 mg/dL (10-13-22 @ 23:12)  Magnesium, Serum: 2.2 mg/dL (10-13-22 @ 16:05)    Phosphorus Level, Serum: 3.1 mg/dL (10-15-22 @ 11:25)  Phosphorus Level, Serum: 6.4 mg/dL (10-15-22 @ 02:04)  Phosphorus Level, Serum: 2.8 mg/dL (10-13-22 @ 23:12)  Phosphorus Level, Serum: 1.8 mg/dL (10-13-22 @ 16:05)                      ABG - ( 15 Oct 2022 21:47 )  pH, Arterial: 7.46  pH, Blood: x     /  pCO2: 38    /  pO2: 108   / HCO3: 27    / Base Excess: 3.0   /  SaO2: 97.8                                    8.8    13.95 )-----------( 403      ( 15 Oct 2022 11:25 )             27.4                         9.2    22.88 )-----------( 567      ( 15 Oct 2022 02:04 )             29.8                         7.5    14.01 )-----------( 336      ( 14 Oct 2022 13:16 )             23.7     CAPILLARY BLOOD GLUCOSE      POCT Blood Glucose.: 260 mg/dL (15 Oct 2022 01:34)    Blood Gas Source Venous: Venous (10-15-22 @ 11:00)      MICROBIOLOGY:     Culture - Urine (collected 13 Oct 2022 10:48)  Source: Clean Catch Clean Catch (Midstream)  Final Report (14 Oct 2022 13:50):    No growth    Culture - Blood (collected 13 Oct 2022 07:30)  Source: .Blood Blood-Peripheral  Preliminary Report (14 Oct 2022 16:01):    No growth to date.    Culture - Blood (collected 13 Oct 2022 07:25)  Source: .Blood Blood-Peripheral  Preliminary Report (14 Oct 2022 16:01):    No growth to date.

## 2022-10-15 NOTE — PROGRESS NOTE ADULT - PROBLEM SELECTOR PLAN 2
Thyroiditis as noted in CT, TSH low, T4 elevated  - C/w lopressor 5 q6  - f/u Endo reccs  - Check Auto-Abs for autoimmune thyroiditis Sepsis 2/2 suspected viral syndrome c/b pna vs aspiration event. Complicated by acute hypoxic respiratory failure requiring HFNC now on Bipap    -c/w Zosyn (atypical organisms covered outpatient)  -c/w O2 supplementation, wean as tolerated  -Incentive Spirometer  -F/u Blood and urine cx

## 2022-10-15 NOTE — CONSULT NOTE ADULT - ASSESSMENT
56 year old who presented with pneumosepsis which has improved. Found to have worsening b/l opacities and respiratory status which improved with diuretics. Likely volume overload from initial volume resuscitation.       # Hypoxemia  # likely 2/2 pulmonary edema   - Worsening hypoxemia likely 2/2 volume resuscitation for her initial presentation of septic shock.   - It has improved with bipap and diuretics  - continue to diurese to euvolemia  - Bipap settings lowered to 10/5 with fio2 40% with pt satting 98%   - Likely does not need bipap but pt likes bipap and wants to keep it.   - Continue to wean oxygen and bipap was able.     Pulm to follow  
57 y/o F w/ thyrotoxicosis likely secondary to acute thyroiditis
Patient is a 56F with past medical hx of HTN, RA, and anxiety who p/w fever, chills, cough, n/v, decreased po intake. Patient reports she had an EGD done two weeks ago and developed a sore throat and neck pain shortly after. ENT consulted for the sore throat and neck pain. CT neck soft tissue revealed thyroiditis and mild retropharyngeal edema. Patient does not have fever but has increased WBC elevated to 13.98.

## 2022-10-15 NOTE — PROGRESS NOTE ADULT - PROBLEM SELECTOR PLAN 3
admit hgb 8.5; downtrended this AM to 6.9/7 on repeat  - Check labs for hemolysis, ELVIN and vitamin deficiencies  - Continue to trend  - no s/s of active bleeding  - Maintain active T&S Thyroiditis as noted in CT, TSH low, T4 markedly elevated  - C/w lopressor 5 q6 - per endo titrate to hr of 70-80s  - f/u Endo reccs  - Check Auto-Abs for autoimmune thyroiditis

## 2022-10-15 NOTE — CONSULT NOTE ADULT - ATTENDING COMMENTS
Ms. Allen is a 57 yo F with PMHx HTN, RA (on Leflunomide for >1yr), GERD (uncontrolled), anxiety who presents with complaint of 2 weeks of sore throat, N/V (NB/NB), fevers (Tmax per patient 107, here 101.2) hypotensive on presentation s/p 2.6L IVFs ultimately req Levophed and hypoxemic resp failure on HFNC.  She had recent EGD/Colonscopy as outpatient just prior to symptom onset and was treated as outpatient with course of Azithromycin and Levaquin without improvement.  Admitted to MICU with septic shock and hypoxic resp failure which quickly resolved - stepped down to floor where developed progressive hypoxia.  CXR with pulm vascular congestion. Clinically improved with lasix already.    #Hypoxic respiratory failure d/t pulm edema + multilobar PNA  - doesn't need bipap but wants to remain on for tonight  - can switch to HFNC tomorrow  - cont diuretics    Sil Palacios MD .

## 2022-10-15 NOTE — PROGRESS NOTE ADULT - PROBLEM SELECTOR PLAN 6
On leflunomide as outpatient.  -Hold currently in setting of infection Unclear etiology at this time.  - Check QTC  - If QTC appropriate may use zofran as needed

## 2022-10-16 LAB
A1C WITH ESTIMATED AVERAGE GLUCOSE RESULT: 4.9 % — SIGNIFICANT CHANGE UP (ref 4–5.6)
ALBUMIN SERPL ELPH-MCNC: 2.8 G/DL — LOW (ref 3.3–5)
ALP SERPL-CCNC: 135 U/L — HIGH (ref 40–120)
ALT FLD-CCNC: 35 U/L — SIGNIFICANT CHANGE UP (ref 10–45)
ANION GAP SERPL CALC-SCNC: 16 MMOL/L — SIGNIFICANT CHANGE UP (ref 5–17)
AST SERPL-CCNC: 20 U/L — SIGNIFICANT CHANGE UP (ref 10–40)
BILIRUB SERPL-MCNC: 0.4 MG/DL — SIGNIFICANT CHANGE UP (ref 0.2–1.2)
BUN SERPL-MCNC: 17 MG/DL — SIGNIFICANT CHANGE UP (ref 7–23)
CALCIUM SERPL-MCNC: 9.3 MG/DL — SIGNIFICANT CHANGE UP (ref 8.4–10.5)
CHLORIDE SERPL-SCNC: 100 MMOL/L — SIGNIFICANT CHANGE UP (ref 96–108)
CO2 SERPL-SCNC: 26 MMOL/L — SIGNIFICANT CHANGE UP (ref 22–31)
CREAT SERPL-MCNC: 0.45 MG/DL — LOW (ref 0.5–1.3)
EGFR: 112 ML/MIN/1.73M2 — SIGNIFICANT CHANGE UP
ESTIMATED AVERAGE GLUCOSE: 94 MG/DL — SIGNIFICANT CHANGE UP (ref 68–114)
GLUCOSE SERPL-MCNC: 141 MG/DL — HIGH (ref 70–99)
HCT VFR BLD CALC: 29.9 % — LOW (ref 34.5–45)
HGB BLD-MCNC: 9.3 G/DL — LOW (ref 11.5–15.5)
MAGNESIUM SERPL-MCNC: 2.4 MG/DL — SIGNIFICANT CHANGE UP (ref 1.6–2.6)
MCHC RBC-ENTMCNC: 29.2 PG — SIGNIFICANT CHANGE UP (ref 27–34)
MCHC RBC-ENTMCNC: 31.1 GM/DL — LOW (ref 32–36)
MCV RBC AUTO: 93.7 FL — SIGNIFICANT CHANGE UP (ref 80–100)
MRSA PCR RESULT.: SIGNIFICANT CHANGE UP
NRBC # BLD: 0 /100 WBCS — SIGNIFICANT CHANGE UP (ref 0–0)
PHOSPHATE SERPL-MCNC: 3.7 MG/DL — SIGNIFICANT CHANGE UP (ref 2.5–4.5)
PLATELET # BLD AUTO: 483 K/UL — HIGH (ref 150–400)
POTASSIUM SERPL-MCNC: 3.5 MMOL/L — SIGNIFICANT CHANGE UP (ref 3.5–5.3)
POTASSIUM SERPL-SCNC: 3.5 MMOL/L — SIGNIFICANT CHANGE UP (ref 3.5–5.3)
PROT SERPL-MCNC: 6.5 G/DL — SIGNIFICANT CHANGE UP (ref 6–8.3)
RBC # BLD: 3.19 M/UL — LOW (ref 3.8–5.2)
RBC # FLD: 13.5 % — SIGNIFICANT CHANGE UP (ref 10.3–14.5)
S AUREUS DNA NOSE QL NAA+PROBE: SIGNIFICANT CHANGE UP
SODIUM SERPL-SCNC: 142 MMOL/L — SIGNIFICANT CHANGE UP (ref 135–145)
WBC # BLD: 8.13 K/UL — SIGNIFICANT CHANGE UP (ref 3.8–10.5)
WBC # FLD AUTO: 8.13 K/UL — SIGNIFICANT CHANGE UP (ref 3.8–10.5)

## 2022-10-16 PROCEDURE — 71045 X-RAY EXAM CHEST 1 VIEW: CPT | Mod: 26

## 2022-10-16 PROCEDURE — 99232 SBSQ HOSP IP/OBS MODERATE 35: CPT | Mod: 25,GC

## 2022-10-16 PROCEDURE — 99233 SBSQ HOSP IP/OBS HIGH 50: CPT

## 2022-10-16 RX ORDER — FUROSEMIDE 40 MG
20 TABLET ORAL ONCE
Refills: 0 | Status: COMPLETED | OUTPATIENT
Start: 2022-10-16 | End: 2022-10-16

## 2022-10-16 RX ADMIN — Medication 20 MILLIGRAM(S): at 11:45

## 2022-10-16 RX ADMIN — Medication 30 MILLIGRAM(S): at 06:34

## 2022-10-16 RX ADMIN — ENOXAPARIN SODIUM 40 MILLIGRAM(S): 100 INJECTION SUBCUTANEOUS at 07:08

## 2022-10-16 RX ADMIN — PIPERACILLIN AND TAZOBACTAM 25 GRAM(S): 4; .5 INJECTION, POWDER, LYOPHILIZED, FOR SOLUTION INTRAVENOUS at 23:54

## 2022-10-16 RX ADMIN — Medication 5 MILLIGRAM(S): at 06:34

## 2022-10-16 RX ADMIN — CHLORHEXIDINE GLUCONATE 1 APPLICATION(S): 213 SOLUTION TOPICAL at 07:08

## 2022-10-16 RX ADMIN — Medication 5 MILLIGRAM(S): at 18:19

## 2022-10-16 RX ADMIN — Medication 30 MILLIGRAM(S): at 18:19

## 2022-10-16 RX ADMIN — Medication 5 MILLIGRAM(S): at 23:54

## 2022-10-16 RX ADMIN — Medication 5 MILLIGRAM(S): at 11:45

## 2022-10-16 RX ADMIN — PIPERACILLIN AND TAZOBACTAM 25 GRAM(S): 4; .5 INJECTION, POWDER, LYOPHILIZED, FOR SOLUTION INTRAVENOUS at 15:10

## 2022-10-16 RX ADMIN — PIPERACILLIN AND TAZOBACTAM 25 GRAM(S): 4; .5 INJECTION, POWDER, LYOPHILIZED, FOR SOLUTION INTRAVENOUS at 06:36

## 2022-10-16 RX ADMIN — Medication 20 MILLIGRAM(S): at 11:44

## 2022-10-16 RX ADMIN — Medication 5 MILLIGRAM(S): at 00:27

## 2022-10-16 NOTE — PROGRESS NOTE ADULT - PROBLEM SELECTOR PLAN 5
K+ 2.7 on latest BMP; Phos 1.8 as well; likely in s/o recurrent vomiting rpt K 4.0    - Replete lytes as necessary

## 2022-10-16 NOTE — PROGRESS NOTE ADULT - PROBLEM SELECTOR PLAN 1
Currently on Bipap 14/8 with prominent tachypnea and tachycardia, CTAB, no stridor. RRT 10/15 for RF, likely in s/o initial volume resuscitation.     -c/w Bipap today  - methylprednisolone and lasix started given concern for either auto-immune component to respiratory distress or related to pulmonary oedema   - no role for methimazole and PTU currently, hyperthyroidism induced tachypnea not consistent with hypoxia. Although elevated thyroid hormone can cause an acute pulmonary hypertension, which would not present with oedema on imaging of lungs. Can consider if extreme circumstance  - Diurese PRN and wean BIPAP as able. Currently on Bipap 14/8 with prominent tachypnea and tachycardia, CTAB, no stridor. RRT 10/15 for RF, likely in s/o initial volume resuscitation.     -10/16: BIPAP de-escalated to HFNC.   - methylprednisolone and lasix started given concern for either auto-immune component to respiratory distress or related to pulmonary oedema   - no role for methimazole and PTU currently, hyperthyroidism induced tachypnea not consistent with hypoxia. Although elevated thyroid hormone can cause an acute pulmonary hypertension, which would not present with oedema on imaging of lungs. Can consider if extreme circumstance  - Diurese PRN and wean BIPAP as able.

## 2022-10-16 NOTE — PROGRESS NOTE ADULT - SUBJECTIVE AND OBJECTIVE BOX
CHIEF COMPLAINT:    Interval Events:  Pt seen and examined at bedside.       REVIEW OF SYSTEMS:      OBJECTIVE:  ICU Vital Signs Last 24 Hrs  T(C): 36.8 (16 Oct 2022 15:54), Max: 36.9 (16 Oct 2022 01:15)  T(F): 98.3 (16 Oct 2022 15:54), Max: 98.4 (16 Oct 2022 01:15)  HR: 102 (16 Oct 2022 18:18) (100 - 112)  BP: 118/78 (16 Oct 2022 18:18) (105/69 - 122/73)  BP(mean): 89 (16 Oct 2022 11:42) (89 - 89)  ABP: --  ABP(mean): --  RR: 18 (16 Oct 2022 15:54) (18 - 20)  SpO2: 97% (16 Oct 2022 18:35) (96% - 99%)    O2 Parameters below as of 16 Oct 2022 18:35  Patient On (Oxygen Delivery Method): nasal cannula  O2 Flow (L/min): 6            10-15 @ 07:01  -  10-16 @ 07:00  --------------------------------------------------------  IN: 400 mL / OUT: 800 mL / NET: -400 mL    10-16 @ 07:01  -  10-16 @ 19:47  --------------------------------------------------------  IN: 240 mL / OUT: 0 mL / NET: 240 mL      CAPILLARY BLOOD GLUCOSE      POCT Blood Glucose.: 260 mg/dL (15 Oct 2022 01:34)        PHYSICAL EXAM:  GENERAL: NAD, well-groomed, well-developed  HEAD:  Atraumatic, Normocephalic  EYES: EOMI, conjunctiva and sclera clear  ENMT: Moist mucous membranes  CHEST/LUNG: Clear to auscultation bilaterally; No rales, rhonchi, wheezing, or rubs  HEART: Regular rate and rhythm; No murmurs, rubs, or gallops  ABDOMEN: Nondistended  VASCULAR: No  cyanosis, or edema  SKIN: No rashes or lesions  NERVOUS SYSTEM:  Alert & Oriented X3, Good concentration    HOSPITAL MEDICATIONS:  MEDICATIONS  (STANDING):  chlorhexidine 4% Liquid 1 Application(s) Topical <User Schedule>  enoxaparin Injectable 40 milliGRAM(s) SubCutaneous every 24 hours  methylPREDNISolone sodium succinate Injectable 30 milliGRAM(s) IV Push every 12 hours  metoprolol tartrate Injectable 5 milliGRAM(s) IV Push every 6 hours  pantoprazole    Tablet 40 milliGRAM(s) Oral before breakfast  PARoxetine 20 milliGRAM(s) Oral daily  piperacillin/tazobactam IVPB.. 3.375 Gram(s) IV Intermittent every 8 hours  piperacillin/tazobactam IVPB.. 3.375 Gram(s) IV Intermittent once    MEDICATIONS  (PRN):  acetaminophen     Tablet .. 650 milliGRAM(s) Oral every 6 hours PRN Temp greater or equal to 38C (100.4F), Mild Pain (1 - 3)  melatonin 3 milliGRAM(s) Oral at bedtime PRN Insomnia      LABS:                        9.3    8.13  )-----------( 483      ( 16 Oct 2022 07:29 )             29.9     10-16    142  |  100  |  17  ----------------------------<  141<H>  3.5   |  26  |  0.45<L>    Ca    9.3      16 Oct 2022 07:29  Phos  3.7     10-16  Mg     2.4     10-16    TPro  6.5  /  Alb  2.8<L>  /  TBili  0.4  /  DBili  x   /  AST  20  /  ALT  35  /  AlkPhos  135<H>  10-16        Arterial Blood Gas:  10-15 @ 21:47  7.46/38/108/27/97.8/3.0  ABG lactate: --  Arterial Blood Gas:  10-15 @ 04:02  7.48/38/162/28/98.2/4.5  ABG lactate: --  Arterial Blood Gas:  10-15 @ 01:48  7.34/37/69/20/93.6/-5.3  ABG lactate: --    Venous Blood Gas:  10-15 @ 11:00  7.48/39/96/29/97.4  VBG Lactate: 1.3      MICROBIOLOGY:     RADIOLOGY:  [ ] Reviewed and interpreted by me    Point of Care Ultrasound Findings:    PFT:    EKG:

## 2022-10-16 NOTE — PROGRESS NOTE ADULT - ASSESSMENT
56 year old who presented with pneumosepsis which has improved. Found to have worsening b/l opacities and respiratory status which improved with diuretics. Likely volume overload from initial volume resuscitation.       # Hypoxemia  # likely 2/2 pulmonary edema   - Worsening hypoxemia likely 2/2 volume resuscitation for her initial presentation of septic shock.   - It has improved with bipap and diuretics  - continue to diurese to euvolemia  - Bipap settings lowered to 10/5 with fio2 40% with pt satting 98%   - HFNC currently, wean as tolerated.

## 2022-10-16 NOTE — PROGRESS NOTE ADULT - PROBLEM SELECTOR PLAN 4
admit hgb 8.5; downtrended this AM to 6.9/7 on repeat  - Check labs for hemolysis, ELVIN and vitamin deficiencies  - Continue to trend  - no s/s of active bleeding  - Maintain active T&S

## 2022-10-16 NOTE — PROGRESS NOTE ADULT - PROBLEM SELECTOR PLAN 3
Thyroiditis as noted in CT, TSH low, T4 markedly elevated  - C/w lopressor 5 q6 - per endo titrate to hr of 70-80s  - f/u Endo reccs  - Check Auto-Abs for autoimmune thyroiditis

## 2022-10-16 NOTE — PROGRESS NOTE ADULT - ATTENDING COMMENTS
Ms. Allen is a 55 yo F with PMHx HTN, RA (on Leflunomide for >1yr), GERD (uncontrolled), anxiety who presents with complaint of 2 weeks of sore throat, N/V (NB/NB), fevers (Tmax per patient 107, here 101.2) hypotensive on presentation s/p 2.6L IVFs ultimately req Levophed and hypoxemic resp failure on HFNC.  She had recent EGD/Colonscopy as outpatient just prior to symptom onset and was treated as outpatient with course of Azithromycin and Levaquin without improvement.  Admitted to MICU with septic shock and hypoxic resp failure which quickly resolved - stepped down to floor where developed progressive hypoxia.  CXR with pulm vascular congestion. Clinically improved with lasix already.    #Hypoxic respiratory failure d/t pulm edema + multilobar PNA  - on HFNC at 30%/30Lpm , should wean to NC  - lasix prn  - Complete course of antibiotics, can switch to Augmentin to complete total 10 days.    Will sign off. Please reconsult as needed.    Sil Palacios MD . Ms. Allen is a 57 yo F with PMHx HTN, RA (on Leflunomide for >1yr), GERD (uncontrolled), anxiety who presents with complaint of 2 weeks of sore throat, N/V (NB/NB), fevers (Tmax per patient 107, here 101.2) hypotensive on presentation s/p 2.6L IVFs ultimately req Levophed and hypoxemic resp failure on HFNC.  She had recent EGD/Colonscopy as outpatient just prior to symptom onset and was treated as outpatient with course of Azithromycin and Levaquin without improvement.  Admitted to MICU with septic shock and hypoxic resp failure which quickly resolved - stepped down to floor where developed progressive hypoxia.  CXR with pulm vascular congestion. Clinically improved with lasix already.    #Hypoxic respiratory failure d/t pulm edema + multilobar PNA  - on HFNC at 30%/30Lpm , should wean to NC  - lasix prn  - no need for steroids from pulm perspective  - Complete course of antibiotics, can switch to Augmentin to complete total 10 days.    Will sign off. Please reconsult as needed.    Sil Palacios MD .

## 2022-10-16 NOTE — PROGRESS NOTE ADULT - ATTENDING COMMENTS
55 Y/O/F with PMHx HTN, RA (on Leflunomide for >1yr), GERD, Anxiety who was initially admitted to MICU with septic shock and hypoxic resp failure and found to have suspected aspiration pneumonia s/p Rx with Levophed and now off.     Acute Hypoxic respirator failure  Continue Zosyn   Respiratory status has improved today. Patient has been weaned off of the bipap. Will continue to wean to nasal cannula today  The patient appeared to respond well to lasix so will continue with diuresis    Thyrotoxicosis due to acute thyroiditis  Pt with low TSH and elevated Free T4 in the setting of subacute symptoms of hyperthyroidism over the past 2 weeks s/p sore throat and EGD with associated neck pain with CT revealing thyroiditis.  endo eval appreciated -->  Recommend increase dose of propranolol for goal HR 70-80.  NSAIDs to decrease inflammation  Depending on how she responds can consider course of prednisone.  Check Thyroid US  Check TSH Receptor Ab and TSI

## 2022-10-16 NOTE — PROGRESS NOTE ADULT - SUBJECTIVE AND OBJECTIVE BOX
PROGRESS NOTE:   Authored by Frances South MD  Pager: [facility] [phone]; [phone]    Patient is a 56y old  Female who presents with a chief complaint of airway monitoring and protection (15 Oct 2022 20:30)      SUBJECTIVE / OVERNIGHT EVENTS:  NAEON. On BiPAP  All other review of systems is negative unless indicated above.    MEDICATIONS  (STANDING):  chlorhexidine 4% Liquid 1 Application(s) Topical <User Schedule>  enoxaparin Injectable 40 milliGRAM(s) SubCutaneous every 24 hours  methylPREDNISolone sodium succinate Injectable 30 milliGRAM(s) IV Push every 12 hours  metoprolol tartrate Injectable 5 milliGRAM(s) IV Push every 6 hours  pantoprazole    Tablet 40 milliGRAM(s) Oral before breakfast  PARoxetine 20 milliGRAM(s) Oral daily  piperacillin/tazobactam IVPB.. 3.375 Gram(s) IV Intermittent once  piperacillin/tazobactam IVPB.. 3.375 Gram(s) IV Intermittent every 8 hours    MEDICATIONS  (PRN):  acetaminophen     Tablet .. 650 milliGRAM(s) Oral every 6 hours PRN Temp greater or equal to 38C (100.4F), Mild Pain (1 - 3)  melatonin 3 milliGRAM(s) Oral at bedtime PRN Insomnia      CAPILLARY BLOOD GLUCOSE        I&O's Summary    15 Oct 2022 07:01  -  16 Oct 2022 07:00  --------------------------------------------------------  IN: 400 mL / OUT: 800 mL / NET: -400 mL        PHYSICAL EXAM:  Vital Signs Last 24 Hrs  T(C): 36.6 (16 Oct 2022 04:40), Max: 37.1 (15 Oct 2022 12:09)  T(F): 97.9 (16 Oct 2022 04:40), Max: 98.8 (15 Oct 2022 12:09)  HR: 105 (16 Oct 2022 05:20) (100 - 112)  BP: 105/69 (16 Oct 2022 04:40) (105/69 - 114/67)  BP(mean): --  RR: 20 (16 Oct 2022 04:40) (20 - 20)  SpO2: 96% (16 Oct 2022 05:20) (94% - 98%)    Parameters below as of 16 Oct 2022 04:40  Patient On (Oxygen Delivery Method): BiPAP/CPAP        General: WN/WD NAD  Neurology: A&Ox3, nonfocal, HERNANDEZ x 4  Eyes: PERRLA/ EOMI, Gross vision intact  ENT/Neck: Neck supple, trachea midline, No JVD, Gross hearing intact  Respiratory: CTA B/L, No wheezing, rales, rhonchi  CV: RRR, +S1/S2, -S3/S4, no murmurs, rubs or gallops  Abdominal: Soft, NT, ND +BS, No HSM  MSK: 5/5 strength UE/LE bilaterally  Extremities: No edema, 2+ peripheral pulses  Skin: No Rashes, Hematoma, Ecchymosis    LABS:                        9.3    8.13  )-----------( 483      ( 16 Oct 2022 07:29 )             29.9     10-15    140  |  101  |  11  ----------------------------<  111<H>  3.3<L>   |  28  |  0.54    Ca    8.9      15 Oct 2022 11:25  Phos  3.1     10-15  Mg     2.0     10-15    TPro  6.4  /  Alb  2.8<L>  /  TBili  0.5  /  DBili  x   /  AST  23  /  ALT  39  /  AlkPhos  140<H>  10-15              Culture - Blood (collected 15 Oct 2022 02:20)  Source: .Blood Blood-Peripheral  Preliminary Report (16 Oct 2022 04:02):    No growth to date.    Culture - Urine (collected 13 Oct 2022 10:48)  Source: Clean Catch Clean Catch (Midstream)  Final Report (14 Oct 2022 13:50):    No growth        RADIOLOGY & ADDITIONAL TESTS:  Results Reviewed:   Imaging Personally Reviewed:  Electrocardiogram Personally Reviewed:    COORDINATION OF CARE:  Care Discussed with Consultants/Other Providers [Y/N]:  Prior or Outpatient Records Reviewed [Y/N]:

## 2022-10-16 NOTE — PROGRESS NOTE ADULT - PROBLEM SELECTOR PLAN 2
Sepsis 2/2 suspected viral syndrome c/b pna vs aspiration event. Complicated by acute hypoxic respiratory failure requiring HFNC now on Bipap    -c/w Zosyn (atypical organisms covered outpatient)  -c/w O2 supplementation, wean as tolerated  -Incentive Spirometer  -F/u Blood and urine cx Sepsis 2/2 suspected viral syndrome c/b pna vs aspiration event. Complicated by acute hypoxic respiratory failure now de-escalated to HFNC    -c/w Zosyn (atypical organisms covered outpatient)  -c/w O2 supplementation, wean as tolerated  -Incentive Spirometer  -F/u Blood and urine cx

## 2022-10-17 DIAGNOSIS — E04.1 NONTOXIC SINGLE THYROID NODULE: ICD-10-CM

## 2022-10-17 DIAGNOSIS — R73.9 HYPERGLYCEMIA, UNSPECIFIED: ICD-10-CM

## 2022-10-17 LAB
ALBUMIN SERPL ELPH-MCNC: 3.2 G/DL — LOW (ref 3.3–5)
ALP SERPL-CCNC: 116 U/L — SIGNIFICANT CHANGE UP (ref 40–120)
ALT FLD-CCNC: 40 U/L — SIGNIFICANT CHANGE UP (ref 10–45)
ANION GAP SERPL CALC-SCNC: 10 MMOL/L — SIGNIFICANT CHANGE UP (ref 5–17)
AST SERPL-CCNC: 22 U/L — SIGNIFICANT CHANGE UP (ref 10–40)
BILIRUB SERPL-MCNC: 0.3 MG/DL — SIGNIFICANT CHANGE UP (ref 0.2–1.2)
BUN SERPL-MCNC: 20 MG/DL — SIGNIFICANT CHANGE UP (ref 7–23)
CALCIUM SERPL-MCNC: 9.5 MG/DL — SIGNIFICANT CHANGE UP (ref 8.4–10.5)
CHLORIDE SERPL-SCNC: 104 MMOL/L — SIGNIFICANT CHANGE UP (ref 96–108)
CO2 SERPL-SCNC: 29 MMOL/L — SIGNIFICANT CHANGE UP (ref 22–31)
CREAT SERPL-MCNC: 0.5 MG/DL — SIGNIFICANT CHANGE UP (ref 0.5–1.3)
EGFR: 109 ML/MIN/1.73M2 — SIGNIFICANT CHANGE UP
GLUCOSE BLDC GLUCOMTR-MCNC: 169 MG/DL — HIGH (ref 70–99)
GLUCOSE SERPL-MCNC: 152 MG/DL — HIGH (ref 70–99)
HCT VFR BLD CALC: 29.6 % — LOW (ref 34.5–45)
HGB BLD-MCNC: 9.2 G/DL — LOW (ref 11.5–15.5)
MAGNESIUM SERPL-MCNC: 2.3 MG/DL — SIGNIFICANT CHANGE UP (ref 1.6–2.6)
MCHC RBC-ENTMCNC: 29 PG — SIGNIFICANT CHANGE UP (ref 27–34)
MCHC RBC-ENTMCNC: 31.1 GM/DL — LOW (ref 32–36)
MCV RBC AUTO: 93.4 FL — SIGNIFICANT CHANGE UP (ref 80–100)
NRBC # BLD: 0 /100 WBCS — SIGNIFICANT CHANGE UP (ref 0–0)
PHOSPHATE SERPL-MCNC: 2.9 MG/DL — SIGNIFICANT CHANGE UP (ref 2.5–4.5)
PLATELET # BLD AUTO: 510 K/UL — HIGH (ref 150–400)
POTASSIUM SERPL-MCNC: 3.6 MMOL/L — SIGNIFICANT CHANGE UP (ref 3.5–5.3)
POTASSIUM SERPL-SCNC: 3.6 MMOL/L — SIGNIFICANT CHANGE UP (ref 3.5–5.3)
PROT SERPL-MCNC: 6.6 G/DL — SIGNIFICANT CHANGE UP (ref 6–8.3)
RBC # BLD: 3.17 M/UL — LOW (ref 3.8–5.2)
RBC # FLD: 13.7 % — SIGNIFICANT CHANGE UP (ref 10.3–14.5)
SARS-COV-2 RNA SPEC QL NAA+PROBE: SIGNIFICANT CHANGE UP
SODIUM SERPL-SCNC: 143 MMOL/L — SIGNIFICANT CHANGE UP (ref 135–145)
TSH RECEP AB FLD-ACNC: 1.23 IU/L — SIGNIFICANT CHANGE UP (ref 0–1.75)
WBC # BLD: 11.35 K/UL — HIGH (ref 3.8–10.5)
WBC # FLD AUTO: 11.35 K/UL — HIGH (ref 3.8–10.5)

## 2022-10-17 PROCEDURE — 99232 SBSQ HOSP IP/OBS MODERATE 35: CPT

## 2022-10-17 PROCEDURE — 93010 ELECTROCARDIOGRAM REPORT: CPT

## 2022-10-17 PROCEDURE — 99233 SBSQ HOSP IP/OBS HIGH 50: CPT | Mod: GC

## 2022-10-17 PROCEDURE — 76536 US EXAM OF HEAD AND NECK: CPT | Mod: 26

## 2022-10-17 RX ORDER — TRAZODONE HCL 50 MG
25 TABLET ORAL AT BEDTIME
Refills: 0 | Status: DISCONTINUED | OUTPATIENT
Start: 2022-10-17 | End: 2022-10-19

## 2022-10-17 RX ORDER — SODIUM CHLORIDE 9 MG/ML
1000 INJECTION, SOLUTION INTRAVENOUS
Refills: 0 | Status: DISCONTINUED | OUTPATIENT
Start: 2022-10-17 | End: 2022-10-19

## 2022-10-17 RX ORDER — DEXTROSE 50 % IN WATER 50 %
12.5 SYRINGE (ML) INTRAVENOUS ONCE
Refills: 0 | Status: DISCONTINUED | OUTPATIENT
Start: 2022-10-17 | End: 2022-10-19

## 2022-10-17 RX ORDER — GLUCAGON INJECTION, SOLUTION 0.5 MG/.1ML
1 INJECTION, SOLUTION SUBCUTANEOUS ONCE
Refills: 0 | Status: DISCONTINUED | OUTPATIENT
Start: 2022-10-17 | End: 2022-10-19

## 2022-10-17 RX ORDER — DEXTROSE 50 % IN WATER 50 %
15 SYRINGE (ML) INTRAVENOUS ONCE
Refills: 0 | Status: DISCONTINUED | OUTPATIENT
Start: 2022-10-17 | End: 2022-10-19

## 2022-10-17 RX ORDER — INSULIN LISPRO 100/ML
VIAL (ML) SUBCUTANEOUS AT BEDTIME
Refills: 0 | Status: DISCONTINUED | OUTPATIENT
Start: 2022-10-17 | End: 2022-10-19

## 2022-10-17 RX ORDER — DEXTROSE 50 % IN WATER 50 %
25 SYRINGE (ML) INTRAVENOUS ONCE
Refills: 0 | Status: DISCONTINUED | OUTPATIENT
Start: 2022-10-17 | End: 2022-10-19

## 2022-10-17 RX ORDER — INSULIN LISPRO 100/ML
VIAL (ML) SUBCUTANEOUS
Refills: 0 | Status: DISCONTINUED | OUTPATIENT
Start: 2022-10-17 | End: 2022-10-19

## 2022-10-17 RX ADMIN — ENOXAPARIN SODIUM 40 MILLIGRAM(S): 100 INJECTION SUBCUTANEOUS at 05:49

## 2022-10-17 RX ADMIN — Medication 20 MILLIGRAM(S): at 12:56

## 2022-10-17 RX ADMIN — PANTOPRAZOLE SODIUM 40 MILLIGRAM(S): 20 TABLET, DELAYED RELEASE ORAL at 05:51

## 2022-10-17 RX ADMIN — Medication 5 MILLIGRAM(S): at 05:50

## 2022-10-17 RX ADMIN — Medication 30 MILLIGRAM(S): at 05:49

## 2022-10-17 RX ADMIN — PIPERACILLIN AND TAZOBACTAM 25 GRAM(S): 4; .5 INJECTION, POWDER, LYOPHILIZED, FOR SOLUTION INTRAVENOUS at 17:06

## 2022-10-17 RX ADMIN — CHLORHEXIDINE GLUCONATE 1 APPLICATION(S): 213 SOLUTION TOPICAL at 09:05

## 2022-10-17 RX ADMIN — PIPERACILLIN AND TAZOBACTAM 25 GRAM(S): 4; .5 INJECTION, POWDER, LYOPHILIZED, FOR SOLUTION INTRAVENOUS at 05:49

## 2022-10-17 RX ADMIN — Medication 25 MILLIGRAM(S): at 21:40

## 2022-10-17 RX ADMIN — Medication 30 MILLIGRAM(S): at 17:07

## 2022-10-17 NOTE — PROGRESS NOTE ADULT - PROBLEM SELECTOR PLAN 3
Thyroiditis as noted in CT, TSH low, T4 markedly elevated  - C/w lopressor 5 q6 - per endo titrate to hr of 70-80s  - f/u Endo reccs  - Check Auto-Abs for autoimmune thyroiditis Thyroiditis as noted in CT, TSH low, T4 markedly elevated  - transition to propranolol 20 q6hr po- per endo titrate to hr of 70-80s  - f/u Endo reccs  - Check Auto-Abs for autoimmune thyroiditis Thyroiditis as noted in CT, TSH low, T4 markedly elevated  - transition to propranolol 20 q6hr po- per endo titrate to hr of 70-80s  - Check Auto-Abs for autoimmune thyroiditis  - f/u thyroid u/s  - c/w pred 40  - ISS while on steroids due to steroid induced hyperglycemia

## 2022-10-17 NOTE — PROGRESS NOTE ADULT - PROBLEM SELECTOR PLAN 6
Unclear etiology at this time.  - Check QTC  - If QTC appropriate may use zofran as needed Unclear etiology at this time.  may use zofran as needed

## 2022-10-17 NOTE — PROGRESS NOTE ADULT - ATTENDING COMMENTS
57 Y/O/F with PMHx HTN, RA (on Leflunomide for >1yr), GERD, Anxiety who was initially admitted to MICU with septic shock and hypoxic respiratory  failure and found to have suspected aspiration pneumonia and c/b RRT and volume overload responded to Lasix off BIAAP and on nasal cannula today cehck TTE  Acute thyroiditis- Thyrotoxicosis   endo following continue propranolol for goal HR 70-80.  taper steroid   follow up labs   continue Abx  d/w patient and her

## 2022-10-17 NOTE — PROGRESS NOTE ADULT - ATTENDING COMMENTS
Agree with Dr. Michelle's assessment and plan as outlined above. Reviewed all pertinent labs, and imaging studies. Modifications made as indicated above. 56 F with thyrotoxicosis secondary to acute thyroiditis now clinically improving on Methylpred (can taper to prednisone 40 mg daily, taper down 5-10 mg every 7 days). Continue with Propranolol, titrate per HR goal 60-80. Repeat FT4 and TT3 to assess improvement in thyrotoxicosis. Thyroid ultrasound shows cystic nodule, no need for FNA. Can be followed up outpatient. Rest of the plan as above.

## 2022-10-17 NOTE — PROGRESS NOTE ADULT - SUBJECTIVE AND OBJECTIVE BOX
INCOMPLETE NOTE    Iván Vizcarra | PGY1| Pager: [phone]  Interval Events:    REVIEW OF SYSTEMS:  CONSTITUTIONAL: No weakness, fevers or chills  EYES/ENT: No visual changes;  No vertigo or throat pain   NECK: No pain or stiffness  RESPIRATORY: No cough, wheezing, hemoptysis; No shortness of breath  CARDIOVASCULAR: No chest pain or palpitations  GASTROINTESTINAL: No abdominal or epigastric pain. No nausea, vomiting, or hematemesis; No diarrhea or constipation. No melena or hematochezia.  GENITOURINARY: No dysuria, frequency or hematuria  NEUROLOGICAL: No numbness or weakness  SKIN: No itching, burning, rashes, or lesions   All other review of systems is negative unless indicated above.    OBJECTIVE:  ICU Vital Signs Last 24 Hrs  T(C): 36.8 (17 Oct 2022 05:00), Max: 37.2 (16 Oct 2022 22:48)  T(F): 98.3 (17 Oct 2022 05:00), Max: 98.9 (16 Oct 2022 22:48)  HR: 85 (17 Oct 2022 05:00) (81 - 104)  BP: 125/76 (17 Oct 2022 05:00) (112/66 - 128/84)  BP(mean): 89 (16 Oct 2022 11:42) (89 - 89)  ABP: --  ABP(mean): --  RR: 18 (17 Oct 2022 05:00) (18 - 20)  SpO2: 99% (17 Oct 2022 05:00) (97% - 99%)    O2 Parameters below as of 17 Oct 2022 05:00  Patient On (Oxygen Delivery Method): nasal cannula  O2 Flow (L/min): 6            10-16 @ 07:01  -  10-17 @ 07:00  --------------------------------------------------------  IN: 240 mL / OUT: 200 mL / NET: 40 mL      CAPILLARY BLOOD GLUCOSE          PHYSICAL EXAM:  General: WN/WD NAD  Neurology: A&Ox3, nonfocal, HERNANDEZ x 4  Eyes: PERRLA/ EOMI, Gross vision intact  ENT/Neck: Neck supple, trachea midline, No JVD, Gross hearing intact  Respiratory: CTA B/L, No wheezing, rales, rhonchi  CV: RRR, +S1/S2, -S3/S4, no murmurs, rubs or gallops  Abdominal: Soft, NT, ND +BS, No HSM  MSK: 5/5 strength UE/LE bilaterally  Extremities: No edema, 2+ peripheral pulses  Skin: No Rashes, Hematoma, Ecchymosis  Incisions:   Tubes:    HOSPITAL MEDICATIONS:  MEDICATIONS  (STANDING):  chlorhexidine 4% Liquid 1 Application(s) Topical <User Schedule>  enoxaparin Injectable 40 milliGRAM(s) SubCutaneous every 24 hours  methylPREDNISolone sodium succinate Injectable 30 milliGRAM(s) IV Push every 12 hours  metoprolol tartrate Injectable 5 milliGRAM(s) IV Push every 6 hours  pantoprazole    Tablet 40 milliGRAM(s) Oral before breakfast  PARoxetine 20 milliGRAM(s) Oral daily  piperacillin/tazobactam IVPB.. 3.375 Gram(s) IV Intermittent every 8 hours  piperacillin/tazobactam IVPB.. 3.375 Gram(s) IV Intermittent once    MEDICATIONS  (PRN):  acetaminophen     Tablet .. 650 milliGRAM(s) Oral every 6 hours PRN Temp greater or equal to 38C (100.4F), Mild Pain (1 - 3)  melatonin 3 milliGRAM(s) Oral at bedtime PRN Insomnia      LABS:                        9.3    8.13  )-----------( 483      ( 16 Oct 2022 07:29 )             29.9     Hgb Trend: 9.3<--, 8.8<--, 9.2<--, 7.5<--, 7.0<--  10-16    142  |  100  |  17  ----------------------------<  141<H>  3.5   |  26  |  0.45<L>    Ca    9.3      16 Oct 2022 07:29  Phos  3.7     10-16  Mg     2.4     10-16    TPro  6.5  /  Alb  2.8<L>  /  TBili  0.4  /  DBili  x   /  AST  20  /  ALT  35  /  AlkPhos  135<H>  10-16    Creatinine Trend: 0.45<--, 0.54<--, 0.53<--, 0.72<--, 0.53<--, 0.54<--      Arterial Blood Gas:  10-15 @ 21:47  7.46/38/108/27/97.8/3.0  ABG lactate: --    Venous Blood Gas:  10-15 @ 11:00  7.48/39/96/29/97.4  VBG Lactate: 1.3      MICROBIOLOGY:     Culture - Blood (collected 15 Oct 2022 02:20)  Source: .Blood Blood-Peripheral  Preliminary Report (16 Oct 2022 04:02):    No growth to date.       Iván Burroughscheryl | PGY1| Pager: [phone]  Interval Events: tolerating weaning to 2LNC. patient endorsesfeeling well. Has had 4 rounds of soft stool overnight, partially liquid, remains brown. Breathing much more comfortably     OBJECTIVE:  ICU Vital Signs Last 24 Hrs  T(C): 36.8 (17 Oct 2022 05:00), Max: 37.2 (16 Oct 2022 22:48)  T(F): 98.3 (17 Oct 2022 05:00), Max: 98.9 (16 Oct 2022 22:48)  HR: 85 (17 Oct 2022 05:00) (81 - 104)  BP: 125/76 (17 Oct 2022 05:00) (112/66 - 128/84)  BP(mean): 89 (16 Oct 2022 11:42) (89 - 89)  ABP: --  ABP(mean): --  RR: 18 (17 Oct 2022 05:00) (18 - 20)  SpO2: 99% (17 Oct 2022 05:00) (97% - 99%)    O2 Parameters below as of 17 Oct 2022 05:00  Patient On (Oxygen Delivery Method): nasal cannula  O2 Flow (L/min): 6            10-16 @ 07:01  -  10-17 @ 07:00  --------------------------------------------------------  IN: 240 mL / OUT: 200 mL / NET: 40 mL      CAPILLARY BLOOD GLUCOSE          PHYSICAL EXAM:  General: WN/WD NAD on 2lnc  Neurology: A&Ox3, nonfocal, HERNANDEZ x 4  Eyes: PERRLA/ EOMI, Gross vision intact  ENT/Neck: Neck supple, trachea midline, No JVD, Gross hearing intact  Respiratory: CTA B/L, No wheezing, rales, rhonchi  CV: RRR, +S1/S2, -S3/S4, no murmurs, rubs or gallops  Abdominal: Soft, NT, ND +BS, No HSM  MSK: 5/5 strength UE/LE bilaterally  Extremities: No edema, 2+ peripheral pulses  Skin: No Rashes, Hematoma, Ecchymosis  Incisions:   Tubes:    HOSPITAL MEDICATIONS:  MEDICATIONS  (STANDING):  chlorhexidine 4% Liquid 1 Application(s) Topical <User Schedule>  enoxaparin Injectable 40 milliGRAM(s) SubCutaneous every 24 hours  methylPREDNISolone sodium succinate Injectable 30 milliGRAM(s) IV Push every 12 hours  metoprolol tartrate Injectable 5 milliGRAM(s) IV Push every 6 hours  pantoprazole    Tablet 40 milliGRAM(s) Oral before breakfast  PARoxetine 20 milliGRAM(s) Oral daily  piperacillin/tazobactam IVPB.. 3.375 Gram(s) IV Intermittent every 8 hours  piperacillin/tazobactam IVPB.. 3.375 Gram(s) IV Intermittent once    MEDICATIONS  (PRN):  acetaminophen     Tablet .. 650 milliGRAM(s) Oral every 6 hours PRN Temp greater or equal to 38C (100.4F), Mild Pain (1 - 3)  melatonin 3 milliGRAM(s) Oral at bedtime PRN Insomnia      LABS:                        9.3    8.13  )-----------( 483      ( 16 Oct 2022 07:29 )             29.9     Hgb Trend: 9.3<--, 8.8<--, 9.2<--, 7.5<--, 7.0<--  10-16    142  |  100  |  17  ----------------------------<  141<H>  3.5   |  26  |  0.45<L>    Ca    9.3      16 Oct 2022 07:29  Phos  3.7     10-16  Mg     2.4     10-16    TPro  6.5  /  Alb  2.8<L>  /  TBili  0.4  /  DBili  x   /  AST  20  /  ALT  35  /  AlkPhos  135<H>  10-16    Creatinine Trend: 0.45<--, 0.54<--, 0.53<--, 0.72<--, 0.53<--, 0.54<--      Arterial Blood Gas:  10-15 @ 21:47  7.46/38/108/27/97.8/3.0  ABG lactate: --    Venous Blood Gas:  10-15 @ 11:00  7.48/39/96/29/97.4  VBG Lactate: 1.3      MICROBIOLOGY:     Culture - Blood (collected 15 Oct 2022 02:20)  Source: .Blood Blood-Peripheral  Preliminary Report (16 Oct 2022 04:02):    No growth to date.       Iván Vizcarra | PGY1| Pager: [phone]    Interval Events: Tolerating weaning to 2LNC. Patient endorses feeling well. Has had 4 rounds of soft stool overnight, partially liquid, remains brown. Breathing much more comfortably     OBJECTIVE:  ICU Vital Signs Last 24 Hrs  T(C): 36.8 (17 Oct 2022 05:00), Max: 37.2 (16 Oct 2022 22:48)  T(F): 98.3 (17 Oct 2022 05:00), Max: 98.9 (16 Oct 2022 22:48)  HR: 85 (17 Oct 2022 05:00) (81 - 104)  BP: 125/76 (17 Oct 2022 05:00) (112/66 - 128/84)  BP(mean): 89 (16 Oct 2022 11:42) (89 - 89)  ABP: --  ABP(mean): --  RR: 18 (17 Oct 2022 05:00) (18 - 20)  SpO2: 99% (17 Oct 2022 05:00) (97% - 99%)    O2 Parameters below as of 17 Oct 2022 05:00  Patient On (Oxygen Delivery Method): nasal cannula  O2 Flow (L/min): 6            10-16 @ 07:01  -  10-17 @ 07:00  --------------------------------------------------------  IN: 240 mL / OUT: 200 mL / NET: 40 mL      CAPILLARY BLOOD GLUCOSE          PHYSICAL EXAM:  General: WN/WD NAD on 2lnc  Neurology: A&Ox3, nonfocal, HERNANDEZ x 4  Eyes: PERRLA/ EOMI, Gross vision intact  ENT/Neck: Neck supple, trachea midline, No JVD, Gross hearing intact  Respiratory: CTA B/L, No wheezing, rales, Rhonchi  CV: RRR, +S1/S2, -S3/S4, no murmurs, rubs or gallops.  Abdominal: Soft, NT, ND +BS, No HSM.  MSK: 5/5 strength UE/LE bilaterally.  Extremities: No edema, 2+ peripheral pulses.  Skin: No Rashes, Hematoma, Ecchymosis.  Incisions:   Tubes:    HOSPITAL MEDICATIONS:  MEDICATIONS  (STANDING):  chlorhexidine 4% Liquid 1 Application(s) Topical <User Schedule>  enoxaparin Injectable 40 milliGRAM(s) SubCutaneous every 24 hours  methylPREDNISolone sodium succinate Injectable 30 milliGRAM(s) IV Push every 12 hours  metoprolol tartrate Injectable 5 milliGRAM(s) IV Push every 6 hours  pantoprazole    Tablet 40 milliGRAM(s) Oral before breakfast  PARoxetine 20 milliGRAM(s) Oral daily  piperacillin/tazobactam IVPB.. 3.375 Gram(s) IV Intermittent every 8 hours  piperacillin/tazobactam IVPB.. 3.375 Gram(s) IV Intermittent once    MEDICATIONS  (PRN):  acetaminophen     Tablet .. 650 milliGRAM(s) Oral every 6 hours PRN Temp greater or equal to 38C (100.4F), Mild Pain (1 - 3)  melatonin 3 milliGRAM(s) Oral at bedtime PRN Insomnia      LABS:                        9.3    8.13  )-----------( 483      ( 16 Oct 2022 07:29 )             29.9     Hgb Trend: 9.3<--, 8.8<--, 9.2<--, 7.5<--, 7.0<--  10-16    142  |  100  |  17  ----------------------------<  141<H>  3.5   |  26  |  0.45<L>    Ca    9.3      16 Oct 2022 07:29  Phos  3.7     10-16  Mg     2.4     10-16    TPro  6.5  /  Alb  2.8<L>  /  TBili  0.4  /  DBili  x   /  AST  20  /  ALT  35  /  AlkPhos  135<H>  10-16    Creatinine Trend: 0.45<--, 0.54<--, 0.53<--, 0.72<--, 0.53<--, 0.54<--      Arterial Blood Gas:  10-15 @ 21:47  7.46/38/108/27/97.8/3.0  ABG lactate: --    Venous Blood Gas:  10-15 @ 11:00  7.48/39/96/29/97.4  VBG Lactate: 1.3      MICROBIOLOGY:     Culture - Blood (collected 15 Oct 2022 02:20)  Source: .Blood Blood-Peripheral  Preliminary Report (16 Oct 2022 04:02):    No growth to date.

## 2022-10-17 NOTE — PROGRESS NOTE ADULT - PROBLEM SELECTOR PLAN 8
DVT ppx: Lovenox  Diet: Regular with Aspiration precautions  Dispo: Pending clinical course  Directives: Full code DVT ppx: Lovenox  Diet: Regular  Dispo: pending PT eval  Directives: Full code

## 2022-10-17 NOTE — PROGRESS NOTE ADULT - ASSESSMENT
57 y/o F w/ thyrotoxicosis likely secondary to acute thyroiditis    Thyrotoxicosis due to acute thyroiditis  Pt with low TSH and elevated Free T4 in the setting of subacute symptoms of hyperthyroidism over the past 2 weeks s/p sore throat and EGD with associated neck pain with CT revealing thyroiditis.  Currently on Solumedrol 30mg IV q12h   Recommend increase dose of propranolol for goal HR 70-80 - dose increased to Propanolol 20mg q6h earlier today   NSAIDs to decrease inflammation  Check Thyroid US - pending   Check TSH Receptor Ab and TSI - specimen received   Follow up Repeat TFTs from 10/17 - specimen received     Thyroid nodule  Check Thyroid US, has 0.9 cm LLP nodule on CT - pending     Hyperglycemia, likely steroid-induced  Check A1c  FSBG qac and qhs while on high dose steroids   Low dose Admelog correction scale qac and separate low dose Admelog correction scale qhs if needed  Goal -180     57 y/o F w/ thyrotoxicosis likely secondary to acute thyroiditis    Thyrotoxicosis due to acute thyroiditis  Pt with low TSH and elevated Free T4 in the setting of subacute symptoms of hyperthyroidism over the past 2 weeks s/p sore throat and EGD with associated neck pain with CT revealing thyroiditis.  Currently on Solumedrol 30mg IV q12h started for hypoxic respiratory failure - titrate per pulm/primary team - will also help to decrease peripheral conversion of thyroid hormone   Titrate Beta blocker to goal HR 70-80 - dose increased to Propanolol 20mg q6h earlier today - hold if SBP < 100 or HR < 60   NSAIDs to decrease inflammation  Check Thyroid US - pending   Check TSH Receptor Ab and TSI - specimen received   Follow up Repeat TFTs from 10/17 - specimen received     Thyroid nodule  Check Thyroid US, has 0.9 cm LLP nodule on CT - pending       Hyperglycemia, likely steroid-induced  A1c 4.9%   FSBG qac and qhs while on high dose steroids   Low dose Admelog correction scale qac and separate low dose Admelog correction scale qhs if needed  Goal -180      Discussed with Dr. Sergio Michelle, DO   Endocrine Fellow  For follow up questions, discharge recommendations, or new consults please call answering service at [phone] (weekdays), [phone] (nights/weekends). For nonurgent matters, please email [email or web address] or [email or web address]

## 2022-10-17 NOTE — PROGRESS NOTE ADULT - PROBLEM SELECTOR PLAN 1
Currently on Bipap 14/8 with prominent tachypnea and tachycardia, CTAB, no stridor. RRT 10/15 for RF, likely in s/o initial volume resuscitation.     -10/16: BIPAP de-escalated to HFNC --> Nasal cannula 6LNC  - c/w methylprednisolone  - Diurese PRN Currently on Bipap 14/8 with prominent tachypnea and tachycardia, CTAB, no stridor. RRT 10/15 for RF, likely in s/o initial volume resuscitation.     -10/16: BIPAP de-escalated to HFNC --> Nasal cannula 6LNC 10/17: 2lnc   - c/w prednisone  - Diurese PRN

## 2022-10-17 NOTE — PROGRESS NOTE ADULT - SUBJECTIVE AND OBJECTIVE BOX
ENDOCRINE FOLLOW UP     Chief Complaint: hyperthyroidism    History: Remains on solumedrol 30mg q12h & on propanolol 20mg po q6h. HR 80s-90s, BP stable. Repeat tfts pending. Abs & US thyroid pending.     MEDICATIONS  (STANDING):  chlorhexidine 4% Liquid 1 Application(s) Topical <User Schedule>  enoxaparin Injectable 40 milliGRAM(s) SubCutaneous every 24 hours  methylPREDNISolone sodium succinate Injectable 30 milliGRAM(s) IV Push every 12 hours  pantoprazole    Tablet 40 milliGRAM(s) Oral before breakfast  PARoxetine 20 milliGRAM(s) Oral daily  piperacillin/tazobactam IVPB.. 3.375 Gram(s) IV Intermittent every 8 hours  piperacillin/tazobactam IVPB.. 3.375 Gram(s) IV Intermittent once  propranolol 20 milliGRAM(s) Oral every 6 hours  traZODone 25 milliGRAM(s) Oral at bedtime    MEDICATIONS  (PRN):  acetaminophen     Tablet .. 650 milliGRAM(s) Oral every 6 hours PRN Temp greater or equal to 38C (100.4F), Mild Pain (1 - 3)  melatonin 3 milliGRAM(s) Oral at bedtime PRN Insomnia      Allergies    No Known Allergies    Intolerances        ROS: All other systems reviewed and negative    PHYSICAL EXAM:  VITALS: T(C): 37.1 (10-17-22 @ 10:05)  T(F): 98.8 (10-17-22 @ 10:05), Max: 98.9 (10-16-22 @ 22:48)  HR: 90 (10-17-22 @ 10:05) (81 - 104)  BP: 126/75 (10-17-22 @ 10:05) (112/66 - 128/84)  RR:  (18 - 20)  SpO2:  (97% - 99%)  Wt(kg): --  GENERAL: NAD, resting comfortably   EYES: No proptosis,  anicteric  HEENT:  Atraumatic, Normocephalic, moist mucous membranes  RESPIRATORY: Nonlabored respirations on room air, normal rate/effort   CARDIOVASCULAR: Regular rate and rhythm  GI: Soft, nontender, non distended  NEURO: AOx3, moves all extremities spontaneously   PSYCH:  reactive affect, euthymic mood    POCT Blood Glucose.: 260 mg/dL (10-15-22 @ 01:34)      10-17    143  |  104  |  20  ----------------------------<  152<H>  3.6   |  29  |  0.50    eGFR: 109    Ca    9.5      10-17  Mg     2.3     10-17  Phos  2.9     10-17    TPro  6.6  /  Alb  3.2<L>  /  TBili  0.3  /  DBili  x   /  AST  22  /  ALT  40  /  AlkPhos  116  10-17      A1C with Estimated Average Glucose Result: 4.9 % (10-16-22 @ 07:29)      Thyroid Stimulating Hormone, Serum: 0.02 uIU/mL (10-13-22 @ 08:17)   ENDOCRINE FOLLOW UP     Chief Complaint: hyperthyroidism    History: Remains on solumedrol 30mg q12h & on propanolol 20mg po q6h. HR 80s-90s, BP stable. Repeat tfts pending. Abs & US thyroid pending.   Reports that she was still having palpitations & difficulty sleeping last night. worried about subcm thyroid nodule.     MEDICATIONS  (STANDING):  chlorhexidine 4% Liquid 1 Application(s) Topical <User Schedule>  enoxaparin Injectable 40 milliGRAM(s) SubCutaneous every 24 hours  methylPREDNISolone sodium succinate Injectable 30 milliGRAM(s) IV Push every 12 hours  pantoprazole    Tablet 40 milliGRAM(s) Oral before breakfast  PARoxetine 20 milliGRAM(s) Oral daily  piperacillin/tazobactam IVPB.. 3.375 Gram(s) IV Intermittent every 8 hours  piperacillin/tazobactam IVPB.. 3.375 Gram(s) IV Intermittent once  propranolol 20 milliGRAM(s) Oral every 6 hours  traZODone 25 milliGRAM(s) Oral at bedtime    MEDICATIONS  (PRN):  acetaminophen     Tablet .. 650 milliGRAM(s) Oral every 6 hours PRN Temp greater or equal to 38C (100.4F), Mild Pain (1 - 3)  melatonin 3 milliGRAM(s) Oral at bedtime PRN Insomnia      Allergies    No Known Allergies    Intolerances        ROS: All other systems reviewed and negative    PHYSICAL EXAM:  VITALS: T(C): 37.1 (10-17-22 @ 10:05)  T(F): 98.8 (10-17-22 @ 10:05), Max: 98.9 (10-16-22 @ 22:48)  HR: 90 (10-17-22 @ 10:05) (81 - 104)  BP: 126/75 (10-17-22 @ 10:05) (112/66 - 128/84)  RR:  (18 - 20)  SpO2:  (97% - 99%)  Wt(kg): --  GENERAL: NAD, resting comfortably   EYES: No proptosis,  anicteric  THYROID: nontender, no palpable nodules appreciated on my exam   HEENT:  Atraumatic, Normocephalic, moist mucous membranes  RESPIRATORY: Nonlabored respirations on room air, normal rate/effort   CARDIOVASCULAR: Regular rate and rhythm  GI: Soft, nontender, non distended  NEURO: AOx3, moves all extremities spontaneously   PSYCH:  reactive affect, euthymic mood    POCT Blood Glucose.: 260 mg/dL (10-15-22 @ 01:34)      10-17    143  |  104  |  20  ----------------------------<  152<H>  3.6   |  29  |  0.50    eGFR: 109    Ca    9.5      10-17  Mg     2.3     10-17  Phos  2.9     10-17    TPro  6.6  /  Alb  3.2<L>  /  TBili  0.3  /  DBili  x   /  AST  22  /  ALT  40  /  AlkPhos  116  10-17      A1C with Estimated Average Glucose Result: 4.9 % (10-16-22 @ 07:29)      Thyroid Stimulating Hormone, Serum: 0.02 uIU/mL (10-13-22 @ 08:17)

## 2022-10-17 NOTE — PROGRESS NOTE ADULT - PROBLEM SELECTOR PLAN 2
Sepsis 2/2 suspected viral syndrome c/b pna vs aspiration event. Complicated by acute hypoxic respiratory failure now de-escalated to HFNC    -c/w Zosyn (10/13-  -c/w O2 supplementation, wean as tolerated  -Incentive Spirometer  -Blood and urine cx negative Sepsis 2/2 suspected viral syndrome c/b pna vs aspiration event. Complicated by acute hypoxic respiratory failure now de-escalated to HFNC  -c/w Zosyn (10/13-  -c/w O2 supplementation, wean as tolerated  -Incentive Spirometer  -Blood and urine cx negative Sepsis 2/2 suspected viral syndrome c/b pna vs aspiration event. Complicated by acute hypoxic respiratory failure now de-escalated to HFNC  -c/w Zosyn (10/13-   - per pulm can transition to amoxicillin for a total of 10d course.   -c/w O2 supplementation, wean as tolerated  -Incentive Spirometer  -Blood and urine cx negative

## 2022-10-18 ENCOUNTER — TRANSCRIPTION ENCOUNTER (OUTPATIENT)
Age: 57
End: 2022-10-18

## 2022-10-18 LAB
ALBUMIN SERPL ELPH-MCNC: 3.2 G/DL — LOW (ref 3.3–5)
ALP SERPL-CCNC: 113 U/L — SIGNIFICANT CHANGE UP (ref 40–120)
ALT FLD-CCNC: 39 U/L — SIGNIFICANT CHANGE UP (ref 10–45)
ANION GAP SERPL CALC-SCNC: 12 MMOL/L — SIGNIFICANT CHANGE UP (ref 5–17)
AST SERPL-CCNC: 20 U/L — SIGNIFICANT CHANGE UP (ref 10–40)
BASOPHILS # BLD AUTO: 0 K/UL — SIGNIFICANT CHANGE UP (ref 0–0.2)
BASOPHILS NFR BLD AUTO: 0 % — SIGNIFICANT CHANGE UP (ref 0–2)
BILIRUB SERPL-MCNC: 0.3 MG/DL — SIGNIFICANT CHANGE UP (ref 0.2–1.2)
BUN SERPL-MCNC: 19 MG/DL — SIGNIFICANT CHANGE UP (ref 7–23)
CALCIUM SERPL-MCNC: 9.6 MG/DL — SIGNIFICANT CHANGE UP (ref 8.4–10.5)
CHLORIDE SERPL-SCNC: 105 MMOL/L — SIGNIFICANT CHANGE UP (ref 96–108)
CO2 SERPL-SCNC: 25 MMOL/L — SIGNIFICANT CHANGE UP (ref 22–31)
CREAT SERPL-MCNC: 0.8 MG/DL — SIGNIFICANT CHANGE UP (ref 0.5–1.3)
CRP SERPL-MCNC: 36 MG/L — HIGH (ref 0–4)
CULTURE RESULTS: SIGNIFICANT CHANGE UP
CULTURE RESULTS: SIGNIFICANT CHANGE UP
DACRYOCYTES BLD QL SMEAR: SLIGHT — SIGNIFICANT CHANGE UP
EGFR: 86 ML/MIN/1.73M2 — SIGNIFICANT CHANGE UP
EOSINOPHIL # BLD AUTO: 0 K/UL — SIGNIFICANT CHANGE UP (ref 0–0.5)
EOSINOPHIL NFR BLD AUTO: 0 % — SIGNIFICANT CHANGE UP (ref 0–6)
FUNGITELL: <31 PG/ML — SIGNIFICANT CHANGE UP
GLUCOSE BLDC GLUCOMTR-MCNC: 127 MG/DL — HIGH (ref 70–99)
GLUCOSE BLDC GLUCOMTR-MCNC: 127 MG/DL — HIGH (ref 70–99)
GLUCOSE BLDC GLUCOMTR-MCNC: 159 MG/DL — HIGH (ref 70–99)
GLUCOSE BLDC GLUCOMTR-MCNC: 162 MG/DL — HIGH (ref 70–99)
GLUCOSE SERPL-MCNC: 167 MG/DL — HIGH (ref 70–99)
HCT VFR BLD CALC: 31.3 % — LOW (ref 34.5–45)
HGB BLD-MCNC: 9.6 G/DL — LOW (ref 11.5–15.5)
LYMPHOCYTES # BLD AUTO: 0.82 K/UL — LOW (ref 1–3.3)
LYMPHOCYTES # BLD AUTO: 7.6 % — LOW (ref 13–44)
MAGNESIUM SERPL-MCNC: 2.2 MG/DL — SIGNIFICANT CHANGE UP (ref 1.6–2.6)
MANUAL SMEAR VERIFICATION: SIGNIFICANT CHANGE UP
MCHC RBC-ENTMCNC: 29.3 PG — SIGNIFICANT CHANGE UP (ref 27–34)
MCHC RBC-ENTMCNC: 30.7 GM/DL — LOW (ref 32–36)
MCV RBC AUTO: 95.4 FL — SIGNIFICANT CHANGE UP (ref 80–100)
METAMYELOCYTES # FLD: 1.7 % — HIGH (ref 0–0)
MONOCYTES # BLD AUTO: 0.27 K/UL — SIGNIFICANT CHANGE UP (ref 0–0.9)
MONOCYTES NFR BLD AUTO: 2.5 % — SIGNIFICANT CHANGE UP (ref 2–14)
MYELOCYTES NFR BLD: 2.5 % — HIGH (ref 0–0)
NEUTROPHILS # BLD AUTO: 8.87 K/UL — HIGH (ref 1.8–7.4)
NEUTROPHILS NFR BLD AUTO: 81.4 % — HIGH (ref 43–77)
NEUTS BAND # BLD: 0.9 % — SIGNIFICANT CHANGE UP (ref 0–8)
NRBC # BLD: 2 /100 — HIGH (ref 0–0)
PHOSPHATE SERPL-MCNC: 2.6 MG/DL — SIGNIFICANT CHANGE UP (ref 2.5–4.5)
PLAT MORPH BLD: NORMAL — SIGNIFICANT CHANGE UP
PLATELET # BLD AUTO: 564 K/UL — HIGH (ref 150–400)
POTASSIUM SERPL-MCNC: 4 MMOL/L — SIGNIFICANT CHANGE UP (ref 3.5–5.3)
POTASSIUM SERPL-SCNC: 4 MMOL/L — SIGNIFICANT CHANGE UP (ref 3.5–5.3)
PROT SERPL-MCNC: 6.7 G/DL — SIGNIFICANT CHANGE UP (ref 6–8.3)
RBC # BLD: 3.28 M/UL — LOW (ref 3.8–5.2)
RBC # FLD: 13.9 % — SIGNIFICANT CHANGE UP (ref 10.3–14.5)
RBC BLD AUTO: SIGNIFICANT CHANGE UP
SODIUM SERPL-SCNC: 142 MMOL/L — SIGNIFICANT CHANGE UP (ref 135–145)
SPECIMEN SOURCE: SIGNIFICANT CHANGE UP
SPECIMEN SOURCE: SIGNIFICANT CHANGE UP
T3 SERPL-MCNC: 159 NG/DL — SIGNIFICANT CHANGE UP (ref 80–200)
T4 FREE SERPL-MCNC: 4.1 NG/DL — HIGH (ref 0.9–1.8)
TSH SERPL-MCNC: <0.01 UIU/ML — LOW (ref 0.27–4.2)
TSI ACT/NOR SER: <0.1 IU/L — SIGNIFICANT CHANGE UP (ref 0–0.55)
VARIANT LYMPHS # BLD: 3.4 % — SIGNIFICANT CHANGE UP (ref 0–6)
WBC # BLD: 10.78 K/UL — HIGH (ref 3.8–10.5)
WBC # FLD AUTO: 10.78 K/UL — HIGH (ref 3.8–10.5)

## 2022-10-18 PROCEDURE — 93306 TTE W/DOPPLER COMPLETE: CPT | Mod: 26

## 2022-10-18 PROCEDURE — 71046 X-RAY EXAM CHEST 2 VIEWS: CPT | Mod: 26

## 2022-10-18 PROCEDURE — 99232 SBSQ HOSP IP/OBS MODERATE 35: CPT | Mod: GC

## 2022-10-18 PROCEDURE — 99232 SBSQ HOSP IP/OBS MODERATE 35: CPT

## 2022-10-18 RX ORDER — FUROSEMIDE 40 MG
20 TABLET ORAL ONCE
Refills: 0 | Status: DISCONTINUED | OUTPATIENT
Start: 2022-10-18 | End: 2022-10-19

## 2022-10-18 RX ADMIN — PIPERACILLIN AND TAZOBACTAM 25 GRAM(S): 4; .5 INJECTION, POWDER, LYOPHILIZED, FOR SOLUTION INTRAVENOUS at 11:47

## 2022-10-18 RX ADMIN — Medication 20 MILLIGRAM(S): at 13:21

## 2022-10-18 RX ADMIN — PANTOPRAZOLE SODIUM 40 MILLIGRAM(S): 20 TABLET, DELAYED RELEASE ORAL at 05:55

## 2022-10-18 RX ADMIN — CHLORHEXIDINE GLUCONATE 1 APPLICATION(S): 213 SOLUTION TOPICAL at 13:24

## 2022-10-18 RX ADMIN — PIPERACILLIN AND TAZOBACTAM 25 GRAM(S): 4; .5 INJECTION, POWDER, LYOPHILIZED, FOR SOLUTION INTRAVENOUS at 17:43

## 2022-10-18 RX ADMIN — PIPERACILLIN AND TAZOBACTAM 25 GRAM(S): 4; .5 INJECTION, POWDER, LYOPHILIZED, FOR SOLUTION INTRAVENOUS at 00:16

## 2022-10-18 RX ADMIN — Medication 40 MILLIGRAM(S): at 05:59

## 2022-10-18 RX ADMIN — Medication 25 MILLIGRAM(S): at 21:21

## 2022-10-18 RX ADMIN — ENOXAPARIN SODIUM 40 MILLIGRAM(S): 100 INJECTION SUBCUTANEOUS at 05:55

## 2022-10-18 NOTE — DISCHARGE NOTE PROVIDER - CARE PROVIDER_API CALL
Eulalio Woodruff (MD)  Internal Medicine  [address] [place] [address]  Phone: [phone]  Fax: [phone]  Follow Up Time:    Eulalio Woodruff (MD)  Internal Medicine  [address] [place] [address]  Phone: [phone]  Fax: [phone]  Follow Up Time:     Breanna Houston  CARDIOVASCULAR DISEASE  [address] [place] [address], [place] [address]  Phone: [phone]  Fax: [phone]  Follow Up Time:     Margareth Armendariz)  Internal Medicine  [address] [place] [address]  Phone: [phone]  Fax: [phone]  Follow Up Time:

## 2022-10-18 NOTE — PROGRESS NOTE ADULT - SUBJECTIVE AND OBJECTIVE BOX
ENDOCRINE FOLLOW UP     Chief Complaint: hyperthyroidism    Interval history:   Patient seen with her  at the bedside.   Switched to Prednisone 40 mg daily propanolol 20mg po q6h. HR 80s-90s, BP stable. Autoimmune Ab negative.   TSH remains suppressed <0.001  FT4 4.1  TT3 286-->159  Thyroid ultrasound reviewed which showed cystic nodule, does not warrant FNA at this time.      MEDICATIONS  (STANDING):  chlorhexidine 4% Liquid 1 Application(s) Topical <User Schedule>  dextrose 5%. 1000 milliLiter(s) (50 mL/Hr) IV Continuous <Continuous>  dextrose 5%. 1000 milliLiter(s) (100 mL/Hr) IV Continuous <Continuous>  dextrose 50% Injectable 25 Gram(s) IV Push once  dextrose 50% Injectable 12.5 Gram(s) IV Push once  dextrose 50% Injectable 25 Gram(s) IV Push once  enoxaparin Injectable 40 milliGRAM(s) SubCutaneous every 24 hours  furosemide   Injectable 20 milliGRAM(s) IV Push once  glucagon  Injectable 1 milliGRAM(s) IntraMuscular once  insulin lispro (ADMELOG) corrective regimen sliding scale   SubCutaneous three times a day before meals  insulin lispro (ADMELOG) corrective regimen sliding scale   SubCutaneous at bedtime  pantoprazole    Tablet 40 milliGRAM(s) Oral before breakfast  PARoxetine 20 milliGRAM(s) Oral daily  piperacillin/tazobactam IVPB.. 3.375 Gram(s) IV Intermittent every 8 hours  piperacillin/tazobactam IVPB.. 3.375 Gram(s) IV Intermittent once  predniSONE   Tablet 40 milliGRAM(s) Oral daily  propranolol 20 milliGRAM(s) Oral every 6 hours  traZODone 25 milliGRAM(s) Oral at bedtime    MEDICATIONS  (PRN):  acetaminophen     Tablet .. 650 milliGRAM(s) Oral every 6 hours PRN Temp greater or equal to 38C (100.4F), Mild Pain (1 - 3)  dextrose Oral Gel 15 Gram(s) Oral once PRN Blood Glucose LESS THAN 70 milliGRAM(s)/deciliter  melatonin 3 milliGRAM(s) Oral at bedtime PRN Insomnia        Allergies    No Known Allergies    Intolerances        ROS: All other systems reviewed and negative    PHYSICAL EXAM:   Vital Signs Last 24 Hrs  T(C): 36.8 (18 Oct 2022 15:37), Max: 36.8 (18 Oct 2022 05:58)  T(F): 98.3 (18 Oct 2022 15:37), Max: 98.3 (18 Oct 2022 15:37)  HR: 99 (18 Oct 2022 15:37) (78 - 99)  BP: 117/70 (18 Oct 2022 15:37) (117/70 - 138/76)  BP(mean): --  RR: 18 (18 Oct 2022 15:37) (18 - 18)  SpO2: 96% (18 Oct 2022 15:37) (95% - 97%)    Parameters below as of 18 Oct 2022 15:37  Patient On (Oxygen Delivery Method): room air      GENERAL: NAD, resting comfortably   EYES: No proptosis,  anicteric  THYROID: nontender, no palpable nodules appreciated on my exam   HEENT:  Atraumatic, Normocephalic, moist mucous membranes  RESPIRATORY: Nonlabored respirations on room air, normal rate/effort   CARDIOVASCULAR: Regular rate and rhythm  GI: Soft, nontender, non distended  NEURO: AOx3, moves all extremities spontaneously   PSYCH:  reactive affect, euthymic mood        10-18    142  |  105  |  19  ----------------------------<  167<H>  4.0   |  25  |  0.80    Ca    9.6      18 Oct 2022 15:54  Phos  2.6     10-18  Mg     2.2     10-18    TPro  6.7  /  Alb  3.2<L>  /  TBili  0.3  /  DBili  x   /  AST  20  /  ALT  39  /  AlkPhos  113  10-18        A1C with Estimated Average Glucose Result: 4.9 % (10-16-22 @ 07:29)      Thyroid Stimulating Hormone, Serum: 0.02 uIU/mL (10-13-22 @ 08:17)

## 2022-10-18 NOTE — DISCHARGE NOTE PROVIDER - PROVIDER TOKENS
PROVIDER:[TOKEN:[[id number]]] PROVIDER:[TOKEN:[[id number]]],PROVIDER:[TOKEN:[[id number]]],PROVIDER:[TOKEN:[[id number]]]

## 2022-10-18 NOTE — DISCHARGE NOTE PROVIDER - NSDCCAREPROVSEEN_GEN_ALL_CORE_FT
[facility] Internal Medicine Team 8  [facility] Endocrinology  [facility]  Cardiology, Dr. Pepe

## 2022-10-18 NOTE — DISCHARGE NOTE PROVIDER - NSDCCPTREATMENT_GEN_ALL_CORE_FT
PRINCIPAL PROCEDURE  Procedure: CT chest w con  Findings and Treatment:   < end of copied text >  FINDINGS:  LUNGS AND AIRWAYS: Mild bilateral interlobular septal thickening. Patchy   and groundglass opacities as well as a patchy and groundglass in the   parahilar left upper lobe as well as more geographic areas in the   posterior left upper and left lower lobes. Bibasilar atelectasis. Two 0.3   cm pulmonary nodules in the right upper lobe (2, 54)  PLEURA: No pleural effusion bilaterally.  MEDIASTINUM AND SALMA: No lymphadenopathy.  VESSELS: No pulmonary embolism.  HEART: Heart is mildly enlarged. No pericardial effusion.  VISUALIZED UPPER ABDOMEN: Within normal limits.  BONES: Small sclerotic focus in the posterior aspect of the left fifth   rib likely and bone island.  IMPRESSION:  No pulmonary embolism.  Mild interlobular septal thickening with patchy and groundglass opacities   in the left upper lobe likely representing mild edema. There may be a   concurrent pneumonia in the left upper and lower lobes. 8 week follow-up   is recommended to assess for improvement.< from: CT Angio Chest PE Protocol w/ IV Cont (10.13.22 @ 11:33) >        SECONDARY PROCEDURE  Procedure: CT neck soft tissue  Findings and Treatment:   < end of copied text >  FINDINGS:  Aerodigestive structures: No evidence of mass or abnormal enhancement.   Mild retropharyngeal edema is noted.  Lymph nodes: No pathologic adenopathy by imaging size criteria.  Parotid and submandibular glands:  Normal.  Thyroid gland: Enlargement and hyperenhancement of the thyroid with   diffuse surrounding fat stranding. 0.9 cm nodule in the left lower pole.  Vascular structures: Unremarkable.  Osseous structures: No fracture, dislocation or destructive lesion.  Dentition: Streak artifact from dental hardware noted.  Paranasal sinuses: Clear.  Mastoid air cells:  Clear.  Partially visualized orbits: Normal  Partially visualized intracranial structures: Normal.  Partially visualized lung apices: Please see concurrent dedicated CT   chest.  IMPRESSION:  Diffuse enlargement of the thyroid with fat surrounding fat stranding,   compatible with thyroiditis. Mild retropharyngeal edema. No enhancing   collection to suggest abscess.< from: CT Neck Soft Tissue w/ IV Cont (10.13.22 @ 11:34) >       PRINCIPAL PROCEDURE  Procedure: CT chest w con  Findings and Treatment:   < end of copied text >  FINDINGS:  LUNGS AND AIRWAYS: Mild bilateral interlobular septal thickening. Patchy   and groundglass opacities as well as a patchy and groundglass in the   parahilar left upper lobe as well as more geographic areas in the   posterior left upper and left lower lobes. Bibasilar atelectasis. Two 0.3   cm pulmonary nodules in the right upper lobe (2, 54)  PLEURA: No pleural effusion bilaterally.  MEDIASTINUM AND SALMA: No lymphadenopathy.  VESSELS: No pulmonary embolism.  HEART: Heart is mildly enlarged. No pericardial effusion.  VISUALIZED UPPER ABDOMEN: Within normal limits.  BONES: Small sclerotic focus in the posterior aspect of the left fifth   rib likely and bone island.  IMPRESSION:  No pulmonary embolism.  Mild interlobular septal thickening with patchy and groundglass opacities   in the left upper lobe likely representing mild edema. There may be a   concurrent pneumonia in the left upper and lower lobes. 8 week follow-up   is recommended to assess for improvement.< from: CT Angio Chest PE Protocol w/ IV Cont (10.13.22 @ 11:33) >        SECONDARY PROCEDURE  Procedure: CT neck soft tissue  Findings and Treatment:   < end of copied text >  FINDINGS:  Aerodigestive structures: No evidence of mass or abnormal enhancement.   Mild retropharyngeal edema is noted.  Lymph nodes: No pathologic adenopathy by imaging size criteria.  Parotid and submandibular glands:  Normal.  Thyroid gland: Enlargement and hyperenhancement of the thyroid with   diffuse surrounding fat stranding. 0.9 cm nodule in the left lower pole.  Vascular structures: Unremarkable.  Osseous structures: No fracture, dislocation or destructive lesion.  Dentition: Streak artifact from dental hardware noted.  Paranasal sinuses: Clear.  Mastoid air cells:  Clear.  Partially visualized orbits: Normal  Partially visualized intracranial structures: Normal.  Partially visualized lung apices: Please see concurrent dedicated CT   chest.  IMPRESSION:  Diffuse enlargement of the thyroid with fat surrounding fat stranding,   compatible with thyroiditis. Mild retropharyngeal edema. No enhancing   collection to suggest abscess.< from: CT Neck Soft Tissue w/ IV Cont (10.13.22 @ 11:34) >      Procedure: Transthoracic echocardiogram  Findings and Treatment:   Conclusions:  1. Normal mitral valve. Mild mitral regurgitation.  2.Mildly dilated left atrium.  LA volume index = 36 cc/m2.  Normal left ventricular internal dimensions and wall  thicknesses.Normal left ventricular systolic function. LVEF  calculated using biplane Paul's method is 65%. There is  inferior and septal hypokinesis.  Moderate diastolic  dysfunction (Stage II).  3. Normal right atrium.Normal right ventricular size and  function.  4. Normal tricuspid valve. Mild tricuspid regurgitation.  Estimated pulmonary artery systolic pressure equals 54  mm  Hg, assuming right atrial pressure equals 8 mm Hg,  consistent with moderate pulmonary pressures.  5. No pericardial effusion.  *** No previous Echo exam.  < from: Transthoracic Echocardiogram (10.18.22 @ 09:29) >

## 2022-10-18 NOTE — PROGRESS NOTE ADULT - ATTENDING COMMENTS
Patient seen and examined   Labs and vitals are reviewed  still on oxygen but she feels better   55 Y/O/F with PMHx HTN, RA (on Leflunomide for >1yr), GERD, Anxiety who was initially admitted to MICU with septic shock and hypoxic respiratory  failure and found to have suspected aspiration pneumonia and c/b RRT and volume overload responded to Lasix off BIAAP and on nasal cannula 2 L   Acute thyroiditis- Thyrotoxicosis continue propanolol f/u TFTS as an out patient   endo following   taper steroid   continue Abx can change to PO upon DC  lasix x1 today  puls ox on room air after ambulation  TTE  d/w patient and her  at bedside

## 2022-10-18 NOTE — PROGRESS NOTE ADULT - ASSESSMENT
Patient is a 56 F with history of HTN, RA, GERD presented with 2 weeks of fever refractory to Azithromycin and Levaquin, admitted for septic shock and acute hypoxic respiratory failure secondary to pneumonia now resolved. Endocrine consulted for thyroiditis.     # Thyrotoxicosis due to subacute  thyroiditis  - Pt with low TSH and elevated Free T4 in the setting of subacute symptoms of hyperthyroidism over the past 2 weeks s/p sore throat and EGD with associated neck pain with CT revealing thyroiditis.  - Pain is now improving   - Can continue with Prednisone 40 mg daily for 5 days (10/18-10/24) and taper down 10 mg every 7 days until outpatient follow up with endocrinology   - Typically, 4-8 week course of prednisone is required  - Will need to repeat TFT as outpatient   - Titrate Beta blocker to goal HR 70-80 - dose increased to Propanolol 20mg q6h earlier today - hold if SBP < 100 or HR < 60 (note that once per day Atenolol dosing may be better for compliance purpose can start with Atenolol 50 mg daily)     # Thyroid nodule  - Thyroid ultrasound shows a cystic nodule, does not need FNA or follow up     # Hyperglycemia, likely steroid-induced  A1c 4.9%   - Continue with Low dose Admelog correction scale qac and separate low dose Admelog correction scale qhs if needed  Goal -180    Coty Hill MD  Department of Endocrinology, diabetes and metabolism   Pager [phone]    For follow up questions, discharge recommendations, or new consults please call answering service at [phone] (weekdays), [phone] (nights/weekends). For nonurgent matters, please email [email or web address] or [email or web address]. Patient should follow up with endocrinology at [address] [place] [address] on 11/10 at 11 am.

## 2022-10-18 NOTE — DISCHARGE NOTE PROVIDER - NSDCCPCAREPLAN_GEN_ALL_CORE_FT
PRINCIPAL DISCHARGE DIAGNOSIS  Diagnosis: Septic shock  Assessment and Plan of Treatment: You presented to the hospital with high fevers and low blood pressure. You were monitored in the medical intensive care unit for septic shock or sepsis requiring medications to prop up your blood pressure. The believed source of your infection was a lung infection or pneumonia. You received IV antibiotics and your symptoms improved. Please finish your antibiotic course and follow-up with your PCP upon discharge.      SECONDARY DISCHARGE DIAGNOSES  Diagnosis: Thyroiditis, acute  Assessment and Plan of Treatment:      PRINCIPAL DISCHARGE DIAGNOSIS  Diagnosis: Septic shock  Assessment and Plan of Treatment: You presented to the hospital with high fevers and low blood pressure. You were monitored in the medical intensive care unit for septic shock or sepsis requiring medications to prop up your blood pressure. The believed source of your infection was a lung infection or pneumonia. You received IV antibiotics and your symptoms improved. Please finish your antibiotic course and follow-up with your PCP upon discharge.      SECONDARY DISCHARGE DIAGNOSES  Diagnosis: Thyroiditis, acute  Assessment and Plan of Treatment: Hyperthyroidism is when the thyroid gland is too active (overactive). The thyroid gland is a small gland located in the lower front part of the neck, just in front of the windpipe (trachea). This gland makes hormones that help control how the body uses food for energy (metabolism) as well as how the heart and brain function. These hormones also play a role in keeping your bones strong. When the thyroid is overactive, it produces too much of a hormone called thyroxine.  This condition may be caused by:  •Inflammation of the thyroid gland.  Treatment depends on the cause and severity of the condition. Treatment may include:  •Medicines to reduce the amount of thyroid hormone your body makes.  We also started you on steroid        PRINCIPAL DISCHARGE DIAGNOSIS  Diagnosis: Septic shock  Assessment and Plan of Treatment: You presented to the hospital with high fevers and low blood pressure. You were monitored in the medical intensive care unit for septic shock or sepsis requiring medications to prop up your blood pressure. The believed source of your infection was a lung infection or pneumonia. You received IV antibiotics and your symptoms improved. Please finish your antibiotic course and follow-up with your PCP upon discharge.      SECONDARY DISCHARGE DIAGNOSES  Diagnosis: Thyroiditis, acute  Assessment and Plan of Treatment: Hyperthyroidism is when the thyroid gland is too active (overactive). The thyroid gland is a small gland located in the lower front part of the neck, just in front of the windpipe (trachea). This gland makes hormones that help control how the body uses food for energy (metabolism) as well as how the heart and brain function. These hormones also play a role in keeping your bones strong. When the thyroid is overactive, it produces too much of a hormone called thyroxine. You were seen by the thyroid doctors and were started on a medication that helps prevent conversion of thyroid hormone to its active form and your symptoms improved. It is very important you follow-up with your PCP and the Endocrinologist upon discharge to have your numbers rechecked and medications adjusted as needed. You will be on propanalol 20mg four times a day as well as a steroid taper. Prednisone 40mg once daily for 4 days, then prednisone 30mg once daily for 5 days then prednisone 20mg once daily for 5 days then prednisone 10mg once daily for 5 days then stop.       PRINCIPAL DISCHARGE DIAGNOSIS  Diagnosis: Septic shock  Assessment and Plan of Treatment: You presented to the hospital with high fevers and low blood pressure. You were monitored in the medical intensive care unit for septic shock or sepsis requiring medications to prop up your blood pressure. The believed source of your infection was a lung infection or pneumonia. You received IV antibiotics and your symptoms improved. Please finish your antibiotic course and follow-up with your PCP upon discharge.      SECONDARY DISCHARGE DIAGNOSES  Diagnosis: Thyroiditis, acute  Assessment and Plan of Treatment: Hyperthyroidism is when the thyroid gland is too active (overactive). The thyroid gland is a small gland located in the lower front part of the neck, just in front of the windpipe (trachea). This gland makes hormones that help control how the body uses food for energy (metabolism) as well as how the heart and brain function. These hormones also play a role in keeping your bones strong. When the thyroid is overactive, it produces too much of a hormone called thyroxine. You were seen by the thyroid doctors and were started on a medication that helps prevent conversion of thyroid hormone to its active form and your symptoms improved. It is very important you follow-up with your PCP and the Endocrinologist upon discharge to have your numbers rechecked and medications adjusted as needed. You will be on propanalol 20mg four times a day as well as a steroid taper. Prednisone 40mg once daily for 4 days, then prednisone 30mg once daily for 5 days then prednisone 20mg once daily for 5 days then prednisone 10mg once daily for 5 days then stop.      Diagnosis: Regional wall motion abnormality of heart  Assessment and Plan of Treatment: You had an ultrasound of your heart while you were here. The ultrasound showed there were two areas of your heart that were not moving as well as they should be. You were seen by the heart team inpatient who recommended since you already have such close follow-up with your Cardiologist, Dr. Houston that you continue to follow with him. You medications were adjusted. Please stop taking olmesartan-HCTZ. Please instead take losartan 25mg daily, lasix 20mg every other day, and a baby aspirin once daily. You may continue with the propanalol as prescribed above.

## 2022-10-18 NOTE — PROGRESS NOTE ADULT - SUBJECTIVE AND OBJECTIVE BOX
Iván Vizcarra | PGY1| Pager: [phone]  Interval Events: Reports liking 2.5LNC, satting well on 1LNC. will attempt to titrate down today. Patient does not like finger sticks, reports feeling significantly better.    OBJECTIVE:  ICU Vital Signs Last 24 Hrs  T(C): 36.8 (18 Oct 2022 05:58), Max: 37.4 (17 Oct 2022 16:04)  T(F): 98.2 (18 Oct 2022 05:58), Max: 99.4 (17 Oct 2022 16:04)  HR: 78 (18 Oct 2022 05:58) (78 - 100)  BP: 138/75 (18 Oct 2022 05:58) (117/80 - 138/75)  BP(mean): --  ABP: --  ABP(mean): --  RR: 18 (18 Oct 2022 05:58) (18 - 18)  SpO2: 97% (18 Oct 2022 05:58) (97% - 99%)    O2 Parameters below as of 18 Oct 2022 05:58  Patient On (Oxygen Delivery Method): nasal cannula  O2 Flow (L/min): 2            10-17 @ 07:01  -  10-18 @ 07:00  --------------------------------------------------------  IN: 380 mL / OUT: 1100 mL / NET: -720 mL      CAPILLARY BLOOD GLUCOSE      POCT Blood Glucose.: 127 mg/dL (18 Oct 2022 07:52)      PHYSICAL EXAM:  General: WN/WD NAD, seen comfortably laying in bed  Neurology: A&Ox3, nonfocal, HERNANDEZ x 4  Eyes: PERRLA/ EOMI, Gross vision intact  ENT/Neck: Neck supple, trachea midline, No JVD, Gross hearing intact  Respiratory: CTA B/L, No wheezing, rales, rhonchi  CV: RRR, +S1/S2, -S3/S4, no murmurs, rubs or gallops  Abdominal: Soft, NT, ND +BS, No HSM  MSK: 5/5 strength UE/LE bilaterally  Extremities: No edema, 2+ peripheral pulses  Skin: No Rashes, Hematoma, Ecchymosis  Incisions:   Tubes:    HOSPITAL MEDICATIONS:  MEDICATIONS  (STANDING):  chlorhexidine 4% Liquid 1 Application(s) Topical <User Schedule>  dextrose 5%. 1000 milliLiter(s) (50 mL/Hr) IV Continuous <Continuous>  dextrose 5%. 1000 milliLiter(s) (100 mL/Hr) IV Continuous <Continuous>  dextrose 50% Injectable 25 Gram(s) IV Push once  dextrose 50% Injectable 12.5 Gram(s) IV Push once  dextrose 50% Injectable 25 Gram(s) IV Push once  enoxaparin Injectable 40 milliGRAM(s) SubCutaneous every 24 hours  glucagon  Injectable 1 milliGRAM(s) IntraMuscular once  insulin lispro (ADMELOG) corrective regimen sliding scale   SubCutaneous three times a day before meals  insulin lispro (ADMELOG) corrective regimen sliding scale   SubCutaneous at bedtime  pantoprazole    Tablet 40 milliGRAM(s) Oral before breakfast  PARoxetine 20 milliGRAM(s) Oral daily  piperacillin/tazobactam IVPB.. 3.375 Gram(s) IV Intermittent every 8 hours  piperacillin/tazobactam IVPB.. 3.375 Gram(s) IV Intermittent once  predniSONE   Tablet 40 milliGRAM(s) Oral daily  propranolol 20 milliGRAM(s) Oral every 6 hours  traZODone 25 milliGRAM(s) Oral at bedtime    MEDICATIONS  (PRN):  acetaminophen     Tablet .. 650 milliGRAM(s) Oral every 6 hours PRN Temp greater or equal to 38C (100.4F), Mild Pain (1 - 3)  dextrose Oral Gel 15 Gram(s) Oral once PRN Blood Glucose LESS THAN 70 milliGRAM(s)/deciliter  melatonin 3 milliGRAM(s) Oral at bedtime PRN Insomnia      LABS:                        9.2    11.35 )-----------( 510      ( 17 Oct 2022 07:24 )             29.6     Hgb Trend: 9.2<--, 9.3<--, 8.8<--, 9.2<--, 7.5<--  10-17    143  |  104  |  20  ----------------------------<  152<H>  3.6   |  29  |  0.50    Ca    9.5      17 Oct 2022 07:27  Phos  2.9     10-17  Mg     2.3     10-17    TPro  6.6  /  Alb  3.2<L>  /  TBili  0.3  /  DBili  x   /  AST  22  /  ALT  40  /  AlkPhos  116  10-17    Creatinine Trend: 0.50<--, 0.45<--, 0.54<--, 0.53<--, 0.72<--, 0.53<--            MICROBIOLOGY:      Iván Vizcarra | PGY1| Pager: [phone]  Interval Events: Reports liking 2.5LNC, satting well on 1LNC. will attempt to titrate down today. Patient does not like finger sticks, reports feeling significantly better.    OBJECTIVE:  ICU Vital Signs Last 24 Hrs  T(C): 36.8 (18 Oct 2022 05:58), Max: 37.4 (17 Oct 2022 16:04)  T(F): 98.2 (18 Oct 2022 05:58), Max: 99.4 (17 Oct 2022 16:04)  HR: 78 (18 Oct 2022 05:58) (78 - 100)  BP: 138/75 (18 Oct 2022 05:58) (117/80 - 138/75)  BP(mean): --  ABP: --  ABP(mean): --  RR: 18 (18 Oct 2022 05:58) (18 - 18)  SpO2: 97% (18 Oct 2022 05:58) (97% - 99%)    O2 Parameters below as of 18 Oct 2022 05:58  Patient On (Oxygen Delivery Method): nasal cannula  O2 Flow (L/min): 2            10-17 @ 07:01  -  10-18 @ 07:00  --------------------------------------------------------  IN: 380 mL / OUT: 1100 mL / NET: -720 mL      CAPILLARY BLOOD GLUCOSE      POCT Blood Glucose.: 127 mg/dL (18 Oct 2022 07:52)      PHYSICAL EXAM:  General: WN/WD NAD, seen comfortably laying in bed  Neurology: A&Ox3, nonfocal, HERNANDEZ x 4  Eyes: PERRLA/ EOMI, Gross vision intact  ENT/Neck: Neck supple, trachea midline, No JVD, Gross hearing intact  Respiratory: CTA B/L, No wheezing, rales, rhonchi  CV: RRR, +S1/S2, -S3/S4, no murmurs, rubs or gallops  Abdominal: Soft, NT, ND +BS, No HSM  MSK: 5/5 strength UE/LE bilaterally  Extremities: No edema, 2+ peripheral pulses  Skin: No Rashes, Hematoma, Ecchymosis.  Incisions:   Tubes:    HOSPITAL MEDICATIONS:  MEDICATIONS  (STANDING):  chlorhexidine 4% Liquid 1 Application(s) Topical <User Schedule>  dextrose 5%. 1000 milliLiter(s) (50 mL/Hr) IV Continuous <Continuous>  dextrose 5%. 1000 milliLiter(s) (100 mL/Hr) IV Continuous <Continuous>  dextrose 50% Injectable 25 Gram(s) IV Push once  dextrose 50% Injectable 12.5 Gram(s) IV Push once  dextrose 50% Injectable 25 Gram(s) IV Push once  enoxaparin Injectable 40 milliGRAM(s) SubCutaneous every 24 hours  glucagon  Injectable 1 milliGRAM(s) IntraMuscular once  insulin lispro (ADMELOG) corrective regimen sliding scale   SubCutaneous three times a day before meals  insulin lispro (ADMELOG) corrective regimen sliding scale   SubCutaneous at bedtime  pantoprazole    Tablet 40 milliGRAM(s) Oral before breakfast  PARoxetine 20 milliGRAM(s) Oral daily  piperacillin/tazobactam IVPB.. 3.375 Gram(s) IV Intermittent every 8 hours  piperacillin/tazobactam IVPB.. 3.375 Gram(s) IV Intermittent once  predniSONE   Tablet 40 milliGRAM(s) Oral daily  propranolol 20 milliGRAM(s) Oral every 6 hours  traZODone 25 milliGRAM(s) Oral at bedtime    MEDICATIONS  (PRN):  acetaminophen     Tablet .. 650 milliGRAM(s) Oral every 6 hours PRN Temp greater or equal to 38C (100.4F), Mild Pain (1 - 3)  dextrose Oral Gel 15 Gram(s) Oral once PRN Blood Glucose LESS THAN 70 milliGRAM(s)/deciliter  melatonin 3 milliGRAM(s) Oral at bedtime PRN Insomnia      LABS:                        9.2    11.35 )-----------( 510      ( 17 Oct 2022 07:24 )             29.6     Hgb Trend: 9.2<--, 9.3<--, 8.8<--, 9.2<--, 7.5<--  10-17    143  |  104  |  20  ----------------------------<  152<H>  3.6   |  29  |  0.50    Ca    9.5      17 Oct 2022 07:27  Phos  2.9     10-17  Mg     2.3     10-17    TPro  6.6  /  Alb  3.2<L>  /  TBili  0.3  /  DBili  x   /  AST  22  /  ALT  40  /  AlkPhos  116  10-17    Creatinine Trend: 0.50<--, 0.45<--, 0.54<--, 0.53<--, 0.72<--, 0.53<--            MICROBIOLOGY:

## 2022-10-18 NOTE — PHYSICAL THERAPY INITIAL EVALUATION ADULT - PLANNED THERAPY INTERVENTIONS, PT EVAL
GOAL: Pt will ascend/descend 10 steps (I) with U HR and step over step pattern in 4 weeks./balance training/gait training/strengthening

## 2022-10-18 NOTE — PROGRESS NOTE ADULT - PROBLEM SELECTOR PLAN 2
Sepsis 2/2 suspected viral syndrome c/b pna vs aspiration event. Complicated by acute hypoxic respiratory failure now de-escalated to HFNC  -c/w Zosyn (10/13-   - per pulm can transition to amoxicillin for a total of 10d course.   -c/w O2 supplementation, wean as tolerated  -Incentive Spirometer  -Blood and urine cx negative

## 2022-10-18 NOTE — PHYSICAL THERAPY INITIAL EVALUATION ADULT - ADDITIONAL COMMENTS
Pt lives with spouse in a PH +8STE +additional flight to bed/bath with handrail. Pt was amb (I) without an AD and (I) with all ADLs PTA. Pt reports  will be able to assist as needed.

## 2022-10-18 NOTE — PHYSICAL THERAPY INITIAL EVALUATION ADULT - PERTINENT HX OF CURRENT PROBLEM, REHAB EVAL
Pt is a 55 y/o  F with PMH: HTN, RA on leflunomide, anxiety who p/w fever, chills, cough, n/v, dec'd PO intake. Pt reports that she received an EGD two weeks ago and developed a sore throat shortly thereafter. Since then has had worsening neck pain. Around 9 days ago started developing fevers T max 107, chills, diaphoresis that did not resolve. Went to PCP who prescribed medicine, completed course, symptoms did not resolve, and +N/V few days ago with dec'd PO. Pt found to have low SPO2 mid 80s% and came to the ED for further evaluation. Pt is p/w thyrotoxicosis likely 2/2 acute thyroiditis. Hospital Course: pt is admitted to MICU with septic shock and hypoxic respiratory failure requiring HFNC and 1day of vasopressors, downgraded to floors on 10/13. Pt is s/p RRT 10/15. CT NECK (10/13):  Diffuse enlargement of the thyroid with fat surrounding fat stranding, compatible with thyroiditis. Mild retropharyngeal edema. No enhancing collection to suggest abscess. CT ANGIO CHEST PE (10/13): No PE. Mild interlobular septal thickening with patchy and groundglass opacities in the left upper lobe likely representing mild edema. ?concurrent PNAin the left upper and lower lobes. XRAY CHEST (10/16): Probable PNA in the right upper lobe medially, not significantly changed.

## 2022-10-18 NOTE — DISCHARGE NOTE PROVIDER - CARE PROVIDERS DIRECT ADDRESSES
[email or web address]

## 2022-10-18 NOTE — PROGRESS NOTE ADULT - PROBLEM SELECTOR PLAN 1
Currently on Bipap 14/8 with prominent tachypnea and tachycardia, CTAB, no stridor. RRT 10/15 for RF, likely in s/o initial volume resuscitation.     - 10/16: BIPAP de-escalated to HFNC --> Nasal cannula 6LNC 10/17: 2lnc 10/18 2LNC   - c/w prednisone  - Diurese PRN Currently on Bipap 14/8 with prominent tachypnea and tachycardia, CTAB, no stridor. RRT 10/15 for RF, likely in s/o initial volume resuscitation.   - 10/16: BIPAP de-escalated to HFNC --> Nasal cannula 6LNC 10/17: 2lnc 10/18 2LNC   - c/w prednisone  - Diurese PRN

## 2022-10-18 NOTE — PROGRESS NOTE ADULT - PROBLEM SELECTOR PLAN 3
Thyroiditis as noted in CT, TSH low, T4 markedly elevated  - transition to propranolol 20 q6hr po- per endo titrate to hr of 70-80s  - Check Auto-Abs for autoimmune thyroiditis  - f/u thyroid u/s  - c/w pred 40  - ISS while on steroids due to steroid induced hyperglycemia

## 2022-10-18 NOTE — DISCHARGE NOTE PROVIDER - HOSPITAL COURSE
HPI:  Sanna Allen is a 56F with hx of HTN, RA on leflunomide, anxiety who p/w fever, chills, cough, n/v, decreased po intake. Patient reports that she received an EGD two weeks ago and developed a sore throat shortly thereafter. Since then has had worsening neck pain. Around 9 days ago started developing fevers T max 107, chills, diaphoresis that did not resolve despite taking tylenol. Went to PCP who prescribed azithromycin and levofloxacin, completed course. however continued to have fevers, chills. Became nauseous a few days ago and started vomiting soon after she starts eating. NBNB, just food colored. Subsequent decreased pO intake, only able to take rice pudding. Reports that her neck and jaw continue to hurt. No dysphagia, no abdominal pain, no cough, chest pain. This morning reports that she took her pulse ox and found to have low oxygen sat mid80s% and came to the ED for further evaluation.    Last steroids was in August 2022, x2 weeks, completed. In the ED, temp 101.5, , 87/56, RR 20, 87% RA. Started on HFNC, given 2.6 L LR, vanc/zosyn, ofirmev, solucortef 100 mg (13 Oct 2022 10:33)    Hospital Course:  Patient accepted to MICU for septic shock and airway monitoring. Required brief period of vasopressors, now titrated off. She was downgraded to the floor on nasal cannula. After downgrade there was RRT for hypoxia, she was excalated to HFNC and then Bipap. CXR revealing flash pulmonary edema. She received lasix and after 1 day of bipap was transitioned back to nasal canula. Corticosteroids were initiated. Repeat TFTs showed small improvement in thyroid function testing. Thyroid US revealed XXXXX. Echocardiogram reveealed XXXXX. PT evaluated patient and XXXXXX>   HPI:  Sanna Allen is a 56F with hx of HTN, RA on leflunomide, anxiety who p/w fever, chills, cough, n/v, decreased po intake. Patient reports that she received an EGD two weeks ago and developed a sore throat shortly thereafter. Since then has had worsening neck pain. Around 9 days ago started developing fevers T max 107, chills, diaphoresis that did not resolve despite taking tylenol. Went to PCP who prescribed azithromycin and levofloxacin, completed course. however continued to have fevers, chills. Became nauseous a few days ago and started vomiting soon after she starts eating. NBNB, just food colored. Subsequent decreased pO intake, only able to take rice pudding. Reports that her neck and jaw continue to hurt. No dysphagia, no abdominal pain, no cough, chest pain. This morning reports that she took her pulse ox and found to have low oxygen sat mid80s% and came to the ED for further evaluation.    Last steroids was in August 2022, x2 weeks, completed. In the ED, temp 101.5, , 87/56, RR 20, 87% RA. Started on HFNC, given 2.6 L LR, vanc/zosyn, ofirmev, solucortef 100 mg (13 Oct 2022 10:33)    Hospital Course:  Patient accepted to MICU for septic shock and airway monitoring. Required brief period of vasopressors, now titrated off. She was downgraded to the floor on nasal cannula. After downgrade there was RRT for hypoxia, she was excalated to HFNC and then Bipap. CXR revealing flash pulmonary edema. She received lasix and after 1 day of bipap was transitioned back to nasal canula. Corticosteroids were initiated. Repeat TFTs showed small improvement in thyroid function testing. Thyroid US revealed normal thyroid with Left LP showing colloid cyst with hemorrhage with a TI-rads1. Echocardiogram reveealed XXXXX. PT evaluated patient and XXXXXX>   HPI:  Sanna Allen is a 56F with hx of HTN, RA on leflunomide, anxiety who p/w fever, chills, cough, n/v, decreased po intake. Patient reports that she received an EGD two weeks ago and developed a sore throat shortly thereafter. Since then has had worsening neck pain. Around 9 days ago started developing fevers T max 107, chills, diaphoresis that did not resolve despite taking tylenol. Went to PCP who prescribed azithromycin and levofloxacin, completed course. however continued to have fevers, chills. Became nauseous a few days ago and started vomiting soon after she starts eating. NBNB, just food colored. Subsequent decreased pO intake, only able to take rice pudding. Reports that her neck and jaw continue to hurt. No dysphagia, no abdominal pain, no cough, chest pain. This morning reports that she took her pulse ox and found to have low oxygen sat mid80s% and came to the ED for further evaluation.    Last steroids was in August 2022, x2 weeks, completed. In the ED, temp 101.5, , 87/56, RR 20, 87% RA. Started on HFNC, given 2.6 L LR, vanc/zosyn, ofirmev, solucortef 100 mg (13 Oct 2022 10:33)    Hospital Course:  Patient accepted to MICU for septic shock and airway monitoring. Required brief period of vasopressors, now titrated off. She was downgraded to the floor on nasal cannula. After downgrade there was RRT for hypoxia, she was excalated to HFNC and then Bipap. CXR revealing flash pulmonary edema. She received lasix and after 1 day of bipap was transitioned back to nasal canula. Corticosteroids were initiated. Repeat TFTs showed small improvement in thyroid function testing. Thyroid US revealed normal thyroid with Left LP showing colloid cyst with hemorrhage with a TI-rads1. Echocardiogram revealed EF of 65%, moderate diastolic dysfunction. PT evaluated patient and recommended D.c to outpatient PT when medically stabilized.        HPI:  Sanna Allen is a 56F with hx of HTN, RA on leflunomide, anxiety who p/w fever, chills, cough, n/v, decreased po intake. Patient reports that she received an EGD two weeks ago and developed a sore throat shortly thereafter. Since then has had worsening neck pain. Around 9 days ago started developing fevers T max 107, chills, diaphoresis that did not resolve despite taking tylenol. Went to PCP who prescribed azithromycin and levofloxacin, completed course. however continued to have fevers, chills. Became nauseous a few days ago and started vomiting soon after she starts eating. NBNB, just food colored. Subsequent decreased pO intake, only able to take rice pudding. Reports that her neck and jaw continue to hurt. No dysphagia, no abdominal pain, no cough, chest pain. This morning reports that she took her pulse ox and found to have low oxygen sat mid80s% and came to the ED for further evaluation.    Last steroids was in August 2022, x2 weeks, completed. In the ED, temp 101.5, , 87/56, RR 20, 87% RA. Started on HFNC, given 2.6 L LR, vanc/zosyn, ofirmev, solucortef 100 mg (13 Oct 2022 10:33)    Hospital Course:  Patient accepted to MICU for septic shock and airway monitoring. Required brief period of vasopressors, now titrated off. She was downgraded to the floor on nasal cannula. After downgrade there was RRT for hypoxia, she was excalated to HFNC and then Bipap. CXR revealing flash pulmonary edema. She received lasix and after 1 day of bipap was transitioned back to nasal canula. Corticosteroids were initiated. Repeat TFTs showed small improvement in thyroid function testing. Thyroid US revealed normal thyroid with Left LP showing colloid cyst with hemorrhage with a TI-rads1. Echocardiogram revealed EF of 65%, moderate diastolic dysfunction with inferior and septal hypokinesis. Outpatient echocardiogram was obtained from outpatient Cardiologist's office showing no history of wall motion abnormalities. Cardiology, Dr. Pepe was consulted for recommendations. PT evaluated patient and recommended D.c to outpatient PT when medically stabilized.     Patient hemodynamically stable and ready for discharge with close PCP and Endocrinology follow-up.       HPI:  Sanna Allen is a 56F with hx of HTN, RA on leflunomide, anxiety who p/w fever, chills, cough, n/v, decreased po intake. Patient reports that she received an EGD two weeks ago and developed a sore throat shortly thereafter. Since then has had worsening neck pain. Around 9 days ago started developing fevers T max 107, chills, diaphoresis that did not resolve despite taking tylenol. Went to PCP who prescribed azithromycin and levofloxacin, completed course. however continued to have fevers, chills. Became nauseous a few days ago and started vomiting soon after she starts eating. NBNB, just food colored. Subsequent decreased pO intake, only able to take rice pudding. Reports that her neck and jaw continue to hurt. No dysphagia, no abdominal pain, no cough, chest pain. This morning reports that she took her pulse ox and found to have low oxygen sat mid80s% and came to the ED for further evaluation.    Last steroids was in August 2022, x2 weeks, completed. In the ED, temp 101.5, , 87/56, RR 20, 87% RA. Started on HFNC, given 2.6 L LR, vanc/zosyn, ofirmev, solucortef 100 mg (13 Oct 2022 10:33)    Hospital Course:  Patient accepted to MICU for septic shock and airway monitoring. Required brief period of vasopressors, now titrated off. She was downgraded to the floor on nasal cannula. After downgrade there was RRT for hypoxia, she was excalated to HFNC and then Bipap. CXR revealing flash pulmonary edema. She received lasix and after 1 day of bipap was transitioned back to nasal canula. Corticosteroids were initiated. Repeat TFTs showed small improvement in thyroid function testing. Thyroid US revealed normal thyroid with Left LP showing colloid cyst with hemorrhage with a TI-rads1. Echocardiogram revealed EF of 65%, moderate diastolic dysfunction with inferior and septal hypokinesis. Outpatient echocardiogram was obtained from outpatient Cardiologist's office showing no history of wall motion abnormalities. Cardiology, Dr. Pepe was consulted for recommendations. Given close follow-up and preserved EF it was deemed safe for patient to follow-up with her primary Cardiologist, Dr. Houston upon discharge. PT evaluated patient and recommended dc to outpatient PT when medically stabilized.     Patient hemodynamically stable and ready for discharge with close PCP, Cardiology and Endocrinology follow-up.

## 2022-10-18 NOTE — DISCHARGE NOTE PROVIDER - NSDCFUSCHEDAPPT_GEN_ALL_CORE_FT
[place]  [facility]  [address]  Scheduled Appointment: 11/18/2022

## 2022-10-18 NOTE — DISCHARGE NOTE PROVIDER - NSDCMRMEDTOKEN_GEN_ALL_CORE_FT
amLODIPine 5 mg oral tablet: 1 tab(s) orally once a day  leflunomide 20 mg oral tablet: 1 tab(s) orally once a day  olmesartan-hydrochlorothiazide 20 mg-12.5 mg oral tablet: 1 tab(s) orally once a day  Paxil 20 mg oral tablet: 1 tab(s) orally once a day  propranolol 40 mg oral tablet: 1 tab(s) orally 2 times a day   amLODIPine 5 mg oral tablet: 1 tab(s) orally once a day  leflunomide 20 mg oral tablet: 1 tab(s) orally once a day  olmesartan-hydrochlorothiazide 20 mg-12.5 mg oral tablet: 1 tab(s) orally once a day  Outpatient Physical Therapy: Outpatient Physical Therapy Prescription    ICD 10: R26.81, Unsteadiness on feet  Paxil 20 mg oral tablet: 1 tab(s) orally once a day  propranolol 40 mg oral tablet: 1 tab(s) orally 2 times a day   acetaminophen 325 mg oral tablet: 2 tab(s) orally every 6 hours, As needed, Temp greater or equal to 38C (100.4F), Mild Pain (1 - 3)  amLODIPine 5 mg oral tablet: 1 tab(s) orally once a day  amoxicillin-clavulanate 875 mg-125 mg oral tablet: 1 tab two times a day for 2 days  Lasix 20 mg oral tablet: 1 tab(s) orally every other day   losartan 25 mg oral tablet: 1 tab(s) orally once a day   Outpatient Physical Therapy: Outpatient Physical Therapy Prescription    ICD 10: R26.81, Unsteadiness on feet  pantoprazole 40 mg oral delayed release tablet: 1 tab(s) orally once a day (before a meal)  Paxil 20 mg oral tablet: 1 tab(s) orally once a day  predniSONE 20 mg oral tablet: take 4 tabs once a day for 4 days then 3 tabs once a day for 5 days then 2 tabs once a day for 5 days then 1 tab once a day for 5 days then stop  propranolol 20 mg oral tablet: 1 tab(s) orally every 6 hours  traZODone: 25 milligram(s) orally once a day (at bedtime), As Needed   acetaminophen 325 mg oral tablet: 2 tab(s) orally every 6 hours, As needed, Temp greater or equal to 38C (100.4F), Mild Pain (1 - 3)  amLODIPine 5 mg oral tablet: 1 tab(s) orally once a day  amoxicillin-clavulanate 875 mg-125 mg oral tablet: 1 tab two times a day for 2 days  Aspirin Enteric Coated 81 mg oral delayed release tablet: 1 tab(s) orally once a day   Lasix 20 mg oral tablet: 1 tab(s) orally every other day   losartan 25 mg oral tablet: 1 tab(s) orally once a day   Outpatient Physical Therapy: Outpatient Physical Therapy Prescription    ICD 10: R26.81, Unsteadiness on feet  pantoprazole 40 mg oral delayed release tablet: 1 tab(s) orally once a day (before a meal)  Paxil 20 mg oral tablet: 1 tab(s) orally once a day  predniSONE 20 mg oral tablet: take 4 tabs once a day for 4 days then 3 tabs once a day for 5 days then 2 tabs once a day for 5 days then 1 tab once a day for 5 days then stop  propranolol 20 mg oral tablet: 1 tab(s) orally every 6 hours  traZODone: 25 milligram(s) orally once a day (at bedtime), As Needed

## 2022-10-19 ENCOUNTER — TRANSCRIPTION ENCOUNTER (OUTPATIENT)
Age: 57
End: 2022-10-19

## 2022-10-19 VITALS
SYSTOLIC BLOOD PRESSURE: 122 MMHG | RESPIRATION RATE: 18 BRPM | TEMPERATURE: 98 F | DIASTOLIC BLOOD PRESSURE: 66 MMHG | HEART RATE: 83 BPM | OXYGEN SATURATION: 95 %

## 2022-10-19 PROBLEM — M06.9 RHEUMATOID ARTHRITIS, UNSPECIFIED: Chronic | Status: ACTIVE | Noted: 2022-10-13

## 2022-10-19 PROBLEM — I10 ESSENTIAL (PRIMARY) HYPERTENSION: Chronic | Status: ACTIVE | Noted: 2022-10-13

## 2022-10-19 LAB
ALBUMIN SERPL ELPH-MCNC: 3.5 G/DL — SIGNIFICANT CHANGE UP (ref 3.3–5)
ALP SERPL-CCNC: 105 U/L — SIGNIFICANT CHANGE UP (ref 40–120)
ALT FLD-CCNC: 41 U/L — SIGNIFICANT CHANGE UP (ref 10–45)
ANION GAP SERPL CALC-SCNC: 11 MMOL/L — SIGNIFICANT CHANGE UP (ref 5–17)
AST SERPL-CCNC: 29 U/L — SIGNIFICANT CHANGE UP (ref 10–40)
BASOPHILS # BLD AUTO: 0.13 K/UL — SIGNIFICANT CHANGE UP (ref 0–0.2)
BASOPHILS NFR BLD AUTO: 1.2 % — SIGNIFICANT CHANGE UP (ref 0–2)
BILIRUB SERPL-MCNC: 0.3 MG/DL — SIGNIFICANT CHANGE UP (ref 0.2–1.2)
BUN SERPL-MCNC: 15 MG/DL — SIGNIFICANT CHANGE UP (ref 7–23)
CALCIUM SERPL-MCNC: 9.5 MG/DL — SIGNIFICANT CHANGE UP (ref 8.4–10.5)
CHLORIDE SERPL-SCNC: 105 MMOL/L — SIGNIFICANT CHANGE UP (ref 96–108)
CO2 SERPL-SCNC: 27 MMOL/L — SIGNIFICANT CHANGE UP (ref 22–31)
CREAT SERPL-MCNC: 0.53 MG/DL — SIGNIFICANT CHANGE UP (ref 0.5–1.3)
EGFR: 108 ML/MIN/1.73M2 — SIGNIFICANT CHANGE UP
EOSINOPHIL # BLD AUTO: 0.05 K/UL — SIGNIFICANT CHANGE UP (ref 0–0.5)
EOSINOPHIL NFR BLD AUTO: 0.5 % — SIGNIFICANT CHANGE UP (ref 0–6)
ERYTHROCYTE [SEDIMENTATION RATE] IN BLOOD: 57 MM/HR — HIGH (ref 0–20)
GLUCOSE BLDC GLUCOMTR-MCNC: 113 MG/DL — HIGH (ref 70–99)
GLUCOSE SERPL-MCNC: 149 MG/DL — HIGH (ref 70–99)
HCT VFR BLD CALC: 34.5 % — SIGNIFICANT CHANGE UP (ref 34.5–45)
HGB BLD-MCNC: 10.5 G/DL — LOW (ref 11.5–15.5)
IMM GRANULOCYTES NFR BLD AUTO: 6.8 % — HIGH (ref 0–0.9)
LYMPHOCYTES # BLD AUTO: 19 % — SIGNIFICANT CHANGE UP (ref 13–44)
LYMPHOCYTES # BLD AUTO: 2.08 K/UL — SIGNIFICANT CHANGE UP (ref 1–3.3)
MAGNESIUM SERPL-MCNC: 2.3 MG/DL — SIGNIFICANT CHANGE UP (ref 1.6–2.6)
MCHC RBC-ENTMCNC: 28.8 PG — SIGNIFICANT CHANGE UP (ref 27–34)
MCHC RBC-ENTMCNC: 30.4 GM/DL — LOW (ref 32–36)
MCV RBC AUTO: 94.8 FL — SIGNIFICANT CHANGE UP (ref 80–100)
MONOCYTES # BLD AUTO: 0.39 K/UL — SIGNIFICANT CHANGE UP (ref 0–0.9)
MONOCYTES NFR BLD AUTO: 3.6 % — SIGNIFICANT CHANGE UP (ref 2–14)
NEUTROPHILS # BLD AUTO: 7.56 K/UL — HIGH (ref 1.8–7.4)
NEUTROPHILS NFR BLD AUTO: 68.9 % — SIGNIFICANT CHANGE UP (ref 43–77)
NRBC # BLD: 1 /100 WBCS — HIGH (ref 0–0)
PHOSPHATE SERPL-MCNC: 2.7 MG/DL — SIGNIFICANT CHANGE UP (ref 2.5–4.5)
PLATELET # BLD AUTO: 547 K/UL — HIGH (ref 150–400)
POTASSIUM SERPL-MCNC: 3.7 MMOL/L — SIGNIFICANT CHANGE UP (ref 3.5–5.3)
POTASSIUM SERPL-SCNC: 3.7 MMOL/L — SIGNIFICANT CHANGE UP (ref 3.5–5.3)
PROT SERPL-MCNC: 7 G/DL — SIGNIFICANT CHANGE UP (ref 6–8.3)
RBC # BLD: 3.64 M/UL — LOW (ref 3.8–5.2)
RBC # FLD: 13.8 % — SIGNIFICANT CHANGE UP (ref 10.3–14.5)
SODIUM SERPL-SCNC: 143 MMOL/L — SIGNIFICANT CHANGE UP (ref 135–145)
WBC # BLD: 10.96 K/UL — HIGH (ref 3.8–10.5)
WBC # FLD AUTO: 10.96 K/UL — HIGH (ref 3.8–10.5)

## 2022-10-19 PROCEDURE — 87449 NOS EACH ORGANISM AG IA: CPT

## 2022-10-19 PROCEDURE — 93306 TTE W/DOPPLER COMPLETE: CPT

## 2022-10-19 PROCEDURE — 84436 ASSAY OF TOTAL THYROXINE: CPT

## 2022-10-19 PROCEDURE — 85610 PROTHROMBIN TIME: CPT

## 2022-10-19 PROCEDURE — 84480 ASSAY TRIIODOTHYRONINE (T3): CPT

## 2022-10-19 PROCEDURE — 83036 HEMOGLOBIN GLYCOSYLATED A1C: CPT

## 2022-10-19 PROCEDURE — 70491 CT SOFT TISSUE NECK W/DYE: CPT | Mod: MA

## 2022-10-19 PROCEDURE — 76536 US EXAM OF HEAD AND NECK: CPT

## 2022-10-19 PROCEDURE — 87086 URINE CULTURE/COLONY COUNT: CPT

## 2022-10-19 PROCEDURE — 83605 ASSAY OF LACTIC ACID: CPT

## 2022-10-19 PROCEDURE — 99238 HOSP IP/OBS DSCHRG MGMT 30/<: CPT | Mod: GC

## 2022-10-19 PROCEDURE — 85027 COMPLETE CBC AUTOMATED: CPT

## 2022-10-19 PROCEDURE — 81003 URINALYSIS AUTO W/O SCOPE: CPT

## 2022-10-19 PROCEDURE — 83520 IMMUNOASSAY QUANT NOS NONAB: CPT

## 2022-10-19 PROCEDURE — 83010 ASSAY OF HAPTOGLOBIN QUANT: CPT

## 2022-10-19 PROCEDURE — 83550 IRON BINDING TEST: CPT

## 2022-10-19 PROCEDURE — 0225U NFCT DS DNA&RNA 21 SARSCOV2: CPT

## 2022-10-19 PROCEDURE — 87040 BLOOD CULTURE FOR BACTERIA: CPT

## 2022-10-19 PROCEDURE — 82746 ASSAY OF FOLIC ACID SERUM: CPT

## 2022-10-19 PROCEDURE — 93308 TTE F-UP OR LMTD: CPT

## 2022-10-19 PROCEDURE — 84439 ASSAY OF FREE THYROXINE: CPT

## 2022-10-19 PROCEDURE — 86850 RBC ANTIBODY SCREEN: CPT

## 2022-10-19 PROCEDURE — 82607 VITAMIN B-12: CPT

## 2022-10-19 PROCEDURE — 85018 HEMOGLOBIN: CPT

## 2022-10-19 PROCEDURE — 82728 ASSAY OF FERRITIN: CPT

## 2022-10-19 PROCEDURE — 84295 ASSAY OF SERUM SODIUM: CPT

## 2022-10-19 PROCEDURE — 85014 HEMATOCRIT: CPT

## 2022-10-19 PROCEDURE — 86800 THYROGLOBULIN ANTIBODY: CPT

## 2022-10-19 PROCEDURE — 82803 BLOOD GASES ANY COMBINATION: CPT

## 2022-10-19 PROCEDURE — 85652 RBC SED RATE AUTOMATED: CPT

## 2022-10-19 PROCEDURE — 87641 MR-STAPH DNA AMP PROBE: CPT

## 2022-10-19 PROCEDURE — 84132 ASSAY OF SERUM POTASSIUM: CPT

## 2022-10-19 PROCEDURE — 71045 X-RAY EXAM CHEST 1 VIEW: CPT

## 2022-10-19 PROCEDURE — 86140 C-REACTIVE PROTEIN: CPT

## 2022-10-19 PROCEDURE — 96375 TX/PRO/DX INJ NEW DRUG ADDON: CPT

## 2022-10-19 PROCEDURE — 71275 CT ANGIOGRAPHY CHEST: CPT | Mod: MA

## 2022-10-19 PROCEDURE — U0003: CPT

## 2022-10-19 PROCEDURE — 83540 ASSAY OF IRON: CPT

## 2022-10-19 PROCEDURE — 82330 ASSAY OF CALCIUM: CPT

## 2022-10-19 PROCEDURE — 84484 ASSAY OF TROPONIN QUANT: CPT

## 2022-10-19 PROCEDURE — 87637 SARSCOV2&INF A&B&RSV AMP PRB: CPT

## 2022-10-19 PROCEDURE — 36415 COLL VENOUS BLD VENIPUNCTURE: CPT

## 2022-10-19 PROCEDURE — 82962 GLUCOSE BLOOD TEST: CPT

## 2022-10-19 PROCEDURE — 82435 ASSAY OF BLOOD CHLORIDE: CPT

## 2022-10-19 PROCEDURE — 99232 SBSQ HOSP IP/OBS MODERATE 35: CPT

## 2022-10-19 PROCEDURE — 83735 ASSAY OF MAGNESIUM: CPT

## 2022-10-19 PROCEDURE — 86901 BLOOD TYPING SEROLOGIC RH(D): CPT

## 2022-10-19 PROCEDURE — 96367 TX/PROPH/DG ADDL SEQ IV INF: CPT

## 2022-10-19 PROCEDURE — 84432 ASSAY OF THYROGLOBULIN: CPT

## 2022-10-19 PROCEDURE — 99291 CRITICAL CARE FIRST HOUR: CPT | Mod: 25

## 2022-10-19 PROCEDURE — 84145 PROCALCITONIN (PCT): CPT

## 2022-10-19 PROCEDURE — 80053 COMPREHEN METABOLIC PANEL: CPT

## 2022-10-19 PROCEDURE — 97161 PT EVAL LOW COMPLEX 20 MIN: CPT

## 2022-10-19 PROCEDURE — 83615 LACTATE (LD) (LDH) ENZYME: CPT

## 2022-10-19 PROCEDURE — 93005 ELECTROCARDIOGRAM TRACING: CPT

## 2022-10-19 PROCEDURE — 83880 ASSAY OF NATRIURETIC PEPTIDE: CPT

## 2022-10-19 PROCEDURE — 86900 BLOOD TYPING SEROLOGIC ABO: CPT

## 2022-10-19 PROCEDURE — 85025 COMPLETE CBC W/AUTO DIFF WBC: CPT

## 2022-10-19 PROCEDURE — U0005: CPT

## 2022-10-19 PROCEDURE — 87640 STAPH A DNA AMP PROBE: CPT

## 2022-10-19 PROCEDURE — 85045 AUTOMATED RETICULOCYTE COUNT: CPT

## 2022-10-19 PROCEDURE — 94660 CPAP INITIATION&MGMT: CPT

## 2022-10-19 PROCEDURE — 84100 ASSAY OF PHOSPHORUS: CPT

## 2022-10-19 PROCEDURE — 82947 ASSAY GLUCOSE BLOOD QUANT: CPT

## 2022-10-19 PROCEDURE — 96368 THER/DIAG CONCURRENT INF: CPT

## 2022-10-19 PROCEDURE — 84445 ASSAY OF TSI GLOBULIN: CPT

## 2022-10-19 PROCEDURE — 80048 BASIC METABOLIC PNL TOTAL CA: CPT

## 2022-10-19 PROCEDURE — 71046 X-RAY EXAM CHEST 2 VIEWS: CPT

## 2022-10-19 PROCEDURE — 96365 THER/PROPH/DIAG IV INF INIT: CPT

## 2022-10-19 PROCEDURE — 85730 THROMBOPLASTIN TIME PARTIAL: CPT

## 2022-10-19 PROCEDURE — 83690 ASSAY OF LIPASE: CPT

## 2022-10-19 PROCEDURE — 84443 ASSAY THYROID STIM HORMONE: CPT

## 2022-10-19 RX ORDER — PROPRANOLOL HCL 160 MG
1 CAPSULE, EXTENDED RELEASE 24HR ORAL
Qty: 0 | Refills: 0 | DISCHARGE

## 2022-10-19 RX ORDER — ASPIRIN/CALCIUM CARB/MAGNESIUM 324 MG
1 TABLET ORAL
Qty: 30 | Refills: 0
Start: 2022-10-19 | End: 2022-11-17

## 2022-10-19 RX ORDER — OLMESARTAN MEDOXOMIL-HYDROCHLOROTHIAZIDE 25; 40 MG/1; MG/1
1 TABLET, FILM COATED ORAL
Qty: 0 | Refills: 0 | DISCHARGE

## 2022-10-19 RX ORDER — PANTOPRAZOLE SODIUM 20 MG/1
1 TABLET, DELAYED RELEASE ORAL
Qty: 30 | Refills: 0
Start: 2022-10-19 | End: 2022-11-17

## 2022-10-19 RX ORDER — TRAZODONE HCL 50 MG
25 TABLET ORAL
Qty: 0 | Refills: 0 | DISCHARGE
Start: 2022-10-19

## 2022-10-19 RX ORDER — LEFLUNOMIDE 10 MG/1
1 TABLET ORAL
Qty: 0 | Refills: 0 | DISCHARGE

## 2022-10-19 RX ORDER — PROPRANOLOL HCL 160 MG
1 CAPSULE, EXTENDED RELEASE 24HR ORAL
Qty: 0 | Refills: 0 | DISCHARGE
Start: 2022-10-19

## 2022-10-19 RX ORDER — ACETAMINOPHEN 500 MG
2 TABLET ORAL
Qty: 0 | Refills: 0 | DISCHARGE
Start: 2022-10-19

## 2022-10-19 RX ORDER — FUROSEMIDE 40 MG
1 TABLET ORAL
Qty: 15 | Refills: 0
Start: 2022-10-19 | End: 2022-11-17

## 2022-10-19 RX ORDER — LOSARTAN POTASSIUM 100 MG/1
1 TABLET, FILM COATED ORAL
Qty: 30 | Refills: 0
Start: 2022-10-19 | End: 2022-11-17

## 2022-10-19 RX ADMIN — ENOXAPARIN SODIUM 40 MILLIGRAM(S): 100 INJECTION SUBCUTANEOUS at 05:45

## 2022-10-19 RX ADMIN — PANTOPRAZOLE SODIUM 40 MILLIGRAM(S): 20 TABLET, DELAYED RELEASE ORAL at 05:45

## 2022-10-19 RX ADMIN — Medication 40 MILLIGRAM(S): at 05:45

## 2022-10-19 RX ADMIN — PIPERACILLIN AND TAZOBACTAM 25 GRAM(S): 4; .5 INJECTION, POWDER, LYOPHILIZED, FOR SOLUTION INTRAVENOUS at 00:29

## 2022-10-19 RX ADMIN — PIPERACILLIN AND TAZOBACTAM 25 GRAM(S): 4; .5 INJECTION, POWDER, LYOPHILIZED, FOR SOLUTION INTRAVENOUS at 17:40

## 2022-10-19 RX ADMIN — Medication 20 MILLIGRAM(S): at 13:00

## 2022-10-19 RX ADMIN — PIPERACILLIN AND TAZOBACTAM 25 GRAM(S): 4; .5 INJECTION, POWDER, LYOPHILIZED, FOR SOLUTION INTRAVENOUS at 08:28

## 2022-10-19 NOTE — CONSULT NOTE ADULT - NS ATTEND AMEND GEN_ALL_CORE FT
Patient care and plan discussed and reviewed with Advanced Care Provider. Plan as outlined above edited by me to reflect our discussion.
ENT Consulted for the sore throat and neck pain. Patient reported she had an EGD done two weeks ago and developed a sore throat and neck pain shortly after. Laryngoscopy shows no erythema, edema, pooling of secretions, masses or lesions. Airway patent. Vocal Folds moving bilaterally. CT neck soft tissue revealed thyroiditis. Consider Endocrine consult. Please contact ENT as needed

## 2022-10-19 NOTE — CONSULT NOTE ADULT - SUBJECTIVE AND OBJECTIVE BOX
DATE OF SERVICE: 10-19-22 @ 16:47    CHIEF COMPLAINT:Patient is a 56y old  Female who presents with a chief complaint of airway monitoring and protection (19 Oct 2022 13:25)      HISTORY OF PRESENT ILLNESS:  Sanna Allen is a 56F with hx of HTN, RA on leflunomide, anxiety who p/w fever, chills, cough, n/v, decreased po intake. Patient reports that she received an EGD two weeks ago and developed a sore throat shortly thereafter. Since then has had worsening neck pain. Around 9 days ago started developing fevers T max 107, chills, diaphoresis that did not resolve despite taking tylenol. Went to PCP who prescribed azithromycin and levofloxacin, completed course. however continued to have fevers, chills. Became nauseous a few days ago and started vomiting soon after she starts eating. NBNB, just food colored. Subsequent decreased pO intake, only able to take rice pudding. Reports that her neck and jaw continue to hurt. No dysphagia, no abdominal pain, no cough, chest pain. This morning reports that she took her pulse ox and found to have low oxygen sat mid80s% and came to the ED for further evaluation.    Last steroids was in August 2022, x2 weeks, completed. In the ED, temp 101.5, , 87/56, RR 20, 87% RA. Started on HFNC, given 2.6 L LR, vanc/zosyn, ofirmev, solucortef 100 mg (13 Oct 2022 10:33)      PAST MEDICAL & SURGICAL HISTORY:  Hypertension      Rheumatoid arthritis              MEDICATIONS:  enoxaparin Injectable 40 milliGRAM(s) SubCutaneous every 24 hours  furosemide   Injectable 20 milliGRAM(s) IV Push once  propranolol 20 milliGRAM(s) Oral every 6 hours    piperacillin/tazobactam IVPB.. 3.375 Gram(s) IV Intermittent once  piperacillin/tazobactam IVPB.. 3.375 Gram(s) IV Intermittent every 8 hours      acetaminophen     Tablet .. 650 milliGRAM(s) Oral every 6 hours PRN  melatonin 3 milliGRAM(s) Oral at bedtime PRN  PARoxetine 20 milliGRAM(s) Oral daily  traZODone 25 milliGRAM(s) Oral at bedtime    pantoprazole    Tablet 40 milliGRAM(s) Oral before breakfast    dextrose 50% Injectable 25 Gram(s) IV Push once  dextrose 50% Injectable 12.5 Gram(s) IV Push once  dextrose Oral Gel 15 Gram(s) Oral once PRN  predniSONE   Tablet 40 milliGRAM(s) Oral daily    chlorhexidine 4% Liquid 1 Application(s) Topical <User Schedule>  dextrose 5%. 1000 milliLiter(s) IV Continuous <Continuous>      FAMILY HISTORY:      Non-contributory    SOCIAL HISTORY:    [- ] Tobacco  [- ] Drugs  [- ] Alcohol    Allergies    No Known Allergies    Intolerances    	    REVIEW OF SYSTEMS:  CONSTITUTIONAL: No fever  EYES: No eye pain, visual disturbances, or discharge  ENMT:  No difficulty hearing, tinnitus  NECK: No pain or stiffness  RESPIRATORY: No cough, wheezing,  CARDIOVASCULAR: No chest pain, palpitations, passing out, dizziness, or leg swelling  GASTROINTESTINAL:  No nausea, vomiting, diarrhea or constipation. No melena.  GENITOURINARY: No dysuria, hematuria  NEUROLOGICAL: No stroke like symptoms  SKIN: No burning or lesions   ENDOCRINE: No heat or cold intolerance  MUSCULOSKELETAL: No joint pain or swelling  PSYCHIATRIC: No  anxiety, mood swings  HEME/LYMPH: No bleeding gums  ALLERGY AND IMMUNOLOGIC: No hives or eczema	    All other ROS negative    PHYSICAL EXAM:  T(C): 36.6 (10-19-22 @ 15:28), Max: 37.1 (10-19-22 @ 01:03)  HR: 83 (10-19-22 @ 15:28) (83 - 109)  BP: 122/66 (10-19-22 @ 15:28) (119/76 - 147/93)  RR: 18 (10-19-22 @ 15:28) (18 - 18)  SpO2: 95% (10-19-22 @ 15:28) (95% - 97%)  Wt(kg): --  I&O's Summary    18 Oct 2022 07:01  -  19 Oct 2022 07:00  --------------------------------------------------------  IN: 1040 mL / OUT: 310 mL / NET: 730 mL    19 Oct 2022 07:01  -  19 Oct 2022 16:47  --------------------------------------------------------  IN: 480 mL / OUT: 0 mL / NET: 480 mL        Appearance: Normal	  HEENT:   Normal oral mucosa, EOMI	  Cardiovascular:  S1 S2, No JVD,    Respiratory: Lungs clear to auscultation	  Psychiatry: Alert  Gastrointestinal:  Soft, Non-tender, + BS	  Skin: No rashes   Neurologic: Non-focal  Extremities:  No edema  Vascular: Peripheral pulses palpable    	    	  	  CARDIAC MARKERS:  Labs personally reviewed by me                                  10.5   10.96 )-----------( 547      ( 19 Oct 2022 10:04 )             34.5     10-19    143  |  105  |  15  ----------------------------<  149<H>  3.7   |  27  |  0.53    Ca    9.5      19 Oct 2022 10:04  Phos  2.7     10-19  Mg     2.3     10-19    TPro  7.0  /  Alb  3.5  /  TBili  0.3  /  DBili  x   /  AST  29  /  ALT  41  /  AlkPhos  105  10-19          EKG: Personally reviewed by me - ST  Radiology: Personally reviewed by me -     < from: Xray Chest 1 View- PORTABLE-Urgent (Xray Chest 1 View- PORTABLE-Urgent .) (10.16.22 @ 11:12) >    IMPRESSION: Probable pneumonia in the right upper lobe medially, not   significantly changed.    < end of copied text >  < from: Transthoracic Echocardiogram (10.18.22 @ 09:29) >  Conclusions:  1. Normal mitral valve. Mild mitral regurgitation.  2.Mildly dilated left atrium.  LA volume index = 36 cc/m2.  Normal left ventricular internal dimensions and wall  thicknesses.Normal left ventricular systolic function. LVEF  calculated using biplane Paul's method is 65%. There is  inferior and septal hypokinesis.  Moderate diastolic  dysfunction (Stage II).  3. Normal right atrium.Normal right ventricular size and  function.  4. Normal tricuspid valve. Mild tricuspid regurgitation.  Estimated pulmonary artery systolic pressure equals 54  mm  Hg, assuming right atrial pressure equals 8 mm Hg,  consistent with moderate pulmonary pressures.  5. No pericardial effusion.  *** No previous Echo exam.    < end of copied text >  < from: CT Angio Chest PE Protocol w/ IV Cont (10.13.22 @ 11:33) >  IMPRESSION:  No pulmonary embolism.    Mild interlobular septal thickening with patchy and groundglass opacities   in the left upper lobe likely representing mild edema. There may be a   concurrent pneumonia in the left upper and lower lobes. 8 week follow-up   is recommended to assess for improvement.    < end of copied text >  < from: US Thyroid (10.17.22 @ 16:39) >  Heterogeneous thyroid gland, consistent with history of thyroiditis.    Left thyroid gland lower pole 1.2 cm complex cystic nodule likely   represents a colloid cyst containing hemorrhage. TI-RAD1.    < end of copied text >      Assessment /Plan:     56F PMHx of HTN, RA (on leflunomide for >1yr), GERD, anxiety, recent EGD/Colonoscopy just prior to symptom onset presenting with 2 weeks of fevers refractory to outpatient Azithromycin and levaquin initially admitted to MICU for management of septic shock initially requiring HFNC and 1 day of vasopressors currently being treated for pneumonia and thyroiditis.     Problem/Plan -1  Problem: Abnormal TTE Findings  - Follows with outpatient Cardiologist Dr. Te Houston  - ECG shows Sinus Tachycardia with no ischemia noted  - TTE shows preserved EF, inferior and septal hypokinesis, moderate diastolic dysfunction (Stage II)  - Overall asymptomatic. Has excellent functional capacity. Walks ~4000 steps per day with spouse.   - Reports occasional shortness of breath a/w anxiety for which Dr. Houston was worked her up for in the past.  - Had normal NST within the last 24 months which was reportedly unremarkable.  - OK for outpatient follow up for ischemic screening with Dr. Houston.      Problem/Plan -2  Problem: Hypertension  - BP currently well controlled.   - Home meds: olmesartan/HCTZ 20/12.5mg daily, amlodipine 5mg PO daily, propanolol 40mg PO BID  - D/t diastolic dysfunction recommend DC HCTZ and start Lasix 20mg PO daily  - HR 70-80bpm as per Endocrine. Agree with possibly changing BB for less frequent dose.       Problem/Plan -3  Problem: Thyroiditis  - c/w Prednisone taper  - c/w Metoprolol  - Will need outpatient follow up for TFT      Differential diagnosis and plan of care discussed with patient after the evaluation. Counseling on diet, nutritional counseling, weight management, exercise and medication compliance was done.   Advanced care planning/advanced directives discussed with patient/family. DNR status including forceful chest compressions to attempt to restart the heart, ventilator support/artificial breathing, electric shock, artificial nutrition, health care proxy, Molst form all discussed with pt. Pt wishes to consider. More than fifteen minutes spent on discussing advanced directives.     Abigail Adams AGN-BC  Jonny Pepe DO [facility]  Cardiovascular Medicine  [address] [place] [address] Dr, [address]  Available for call or text via [employer]  Office [phone]   DATE OF SERVICE: 10-19-22 @ 16:47    CHIEF COMPLAINT:Patient is a 56y old  Female who presents with a chief complaint of airway monitoring and protection (19 Oct 2022 13:25)      HISTORY OF PRESENT ILLNESS:  Sanna Allen is a 56F with hx of HTN, RA on leflunomide, anxiety who p/w fever, chills, cough, n/v, decreased po intake. Patient reports that she received an EGD two weeks ago and developed a sore throat shortly thereafter. Since then has had worsening neck pain. Around 9 days ago started developing fevers T max 107, chills, diaphoresis that did not resolve despite taking tylenol. Went to PCP who prescribed azithromycin and levofloxacin, completed course. however continued to have fevers, chills. Became nauseous a few days ago and started vomiting soon after she starts eating. NBNB, just food colored. Subsequent decreased pO intake, only able to take rice pudding. Reports that her neck and jaw continue to hurt. No dysphagia, no abdominal pain, no cough, chest pain. This morning reports that she took her pulse ox and found to have low oxygen sat mid80s% and came to the ED for further evaluation.    Last steroids was in August 2022, x2 weeks, completed. In the ED, temp 101.5, , 87/56, RR 20, 87% RA. Started on HFNC, given 2.6 L LR, vanc/zosyn, ofirmev, solucortef 100 mg (13 Oct 2022 10:33)      PAST MEDICAL & SURGICAL HISTORY:  Hypertension      Rheumatoid arthritis              MEDICATIONS:  enoxaparin Injectable 40 milliGRAM(s) SubCutaneous every 24 hours  furosemide   Injectable 20 milliGRAM(s) IV Push once  propranolol 20 milliGRAM(s) Oral every 6 hours    piperacillin/tazobactam IVPB.. 3.375 Gram(s) IV Intermittent once  piperacillin/tazobactam IVPB.. 3.375 Gram(s) IV Intermittent every 8 hours      acetaminophen     Tablet .. 650 milliGRAM(s) Oral every 6 hours PRN  melatonin 3 milliGRAM(s) Oral at bedtime PRN  PARoxetine 20 milliGRAM(s) Oral daily  traZODone 25 milliGRAM(s) Oral at bedtime    pantoprazole    Tablet 40 milliGRAM(s) Oral before breakfast    dextrose 50% Injectable 25 Gram(s) IV Push once  dextrose 50% Injectable 12.5 Gram(s) IV Push once  dextrose Oral Gel 15 Gram(s) Oral once PRN  predniSONE   Tablet 40 milliGRAM(s) Oral daily    chlorhexidine 4% Liquid 1 Application(s) Topical <User Schedule>  dextrose 5%. 1000 milliLiter(s) IV Continuous <Continuous>      FAMILY HISTORY:      Non-contributory    SOCIAL HISTORY:    [- ] Tobacco  [- ] Drugs  [- ] Alcohol    Allergies    No Known Allergies    Intolerances    	    REVIEW OF SYSTEMS:  CONSTITUTIONAL: No fever  EYES: No eye pain, visual disturbances, or discharge  ENMT:  No difficulty hearing, tinnitus  NECK: No pain or stiffness  RESPIRATORY: No cough, wheezing,  CARDIOVASCULAR: No chest pain, palpitations, passing out, dizziness, or leg swelling  GASTROINTESTINAL:  No nausea, vomiting, diarrhea or constipation. No melena.  GENITOURINARY: No dysuria, hematuria  NEUROLOGICAL: No stroke like symptoms  SKIN: No burning or lesions   ENDOCRINE: No heat or cold intolerance  MUSCULOSKELETAL: No joint pain or swelling  PSYCHIATRIC: No  anxiety, mood swings  HEME/LYMPH: No bleeding gums  ALLERGY AND IMMUNOLOGIC: No hives or eczema	    All other ROS negative    PHYSICAL EXAM:  T(C): 36.6 (10-19-22 @ 15:28), Max: 37.1 (10-19-22 @ 01:03)  HR: 83 (10-19-22 @ 15:28) (83 - 109)  BP: 122/66 (10-19-22 @ 15:28) (119/76 - 147/93)  RR: 18 (10-19-22 @ 15:28) (18 - 18)  SpO2: 95% (10-19-22 @ 15:28) (95% - 97%)  Wt(kg): --  I&O's Summary    18 Oct 2022 07:01  -  19 Oct 2022 07:00  --------------------------------------------------------  IN: 1040 mL / OUT: 310 mL / NET: 730 mL    19 Oct 2022 07:01  -  19 Oct 2022 16:47  --------------------------------------------------------  IN: 480 mL / OUT: 0 mL / NET: 480 mL        Appearance: Normal	  HEENT:   Normal oral mucosa, EOMI	  Cardiovascular:  S1 S2, No JVD,    Respiratory: Lungs clear to auscultation	  Psychiatry: Alert  Gastrointestinal:  Soft, Non-tender, + BS	  Skin: No rashes   Neurologic: Non-focal  Extremities:  No edema  Vascular: Peripheral pulses palpable    	    	  	  CARDIAC MARKERS:  Labs personally reviewed by me                                  10.5   10.96 )-----------( 547      ( 19 Oct 2022 10:04 )             34.5     10-19    143  |  105  |  15  ----------------------------<  149<H>  3.7   |  27  |  0.53    Ca    9.5      19 Oct 2022 10:04  Phos  2.7     10-19  Mg     2.3     10-19    TPro  7.0  /  Alb  3.5  /  TBili  0.3  /  DBili  x   /  AST  29  /  ALT  41  /  AlkPhos  105  10-19          EKG: Personally reviewed by me - ST  Radiology: Personally reviewed by me -     < from: Xray Chest 1 View- PORTABLE-Urgent (Xray Chest 1 View- PORTABLE-Urgent .) (10.16.22 @ 11:12) >    IMPRESSION: Probable pneumonia in the right upper lobe medially, not   significantly changed.    < end of copied text >  < from: Transthoracic Echocardiogram (10.18.22 @ 09:29) >  Conclusions:  1. Normal mitral valve. Mild mitral regurgitation.  2.Mildly dilated left atrium.  LA volume index = 36 cc/m2.  Normal left ventricular internal dimensions and wall  thicknesses.Normal left ventricular systolic function. LVEF  calculated using biplane Paul's method is 65%. There is  inferior and septal hypokinesis.  Moderate diastolic  dysfunction (Stage II).  3. Normal right atrium.Normal right ventricular size and  function.  4. Normal tricuspid valve. Mild tricuspid regurgitation.  Estimated pulmonary artery systolic pressure equals 54  mm  Hg, assuming right atrial pressure equals 8 mm Hg,  consistent with moderate pulmonary pressures.  5. No pericardial effusion.  *** No previous Echo exam.    < end of copied text >  < from: CT Angio Chest PE Protocol w/ IV Cont (10.13.22 @ 11:33) >  IMPRESSION:  No pulmonary embolism.    Mild interlobular septal thickening with patchy and groundglass opacities   in the left upper lobe likely representing mild edema. There may be a   concurrent pneumonia in the left upper and lower lobes. 8 week follow-up   is recommended to assess for improvement.    < end of copied text >  < from: US Thyroid (10.17.22 @ 16:39) >  Heterogeneous thyroid gland, consistent with history of thyroiditis.    Left thyroid gland lower pole 1.2 cm complex cystic nodule likely   represents a colloid cyst containing hemorrhage. TI-RAD1.    < end of copied text >      Assessment /Plan:     56F PMHx of HTN, RA (on leflunomide for >1yr), GERD, anxiety, recent EGD/Colonoscopy just prior to symptom onset presenting with 2 weeks of fevers refractory to outpatient Azithromycin and levaquin initially admitted to MICU for management of septic shock initially requiring HFNC and 1 day of vasopressors currently being treated for pneumonia and thyroiditis.     Problem/Plan -1  Problem: Abnormal TTE Findings  - Follows with outpatient Cardiologist Dr. Te Houston  - ECG shows Sinus Tachycardia with no ischemia noted  - TTE shows preserved EF, inferior and septal hypokinesis, moderate diastolic dysfunction (Stage II)  - Overall asymptomatic. Has excellent functional capacity. Walks ~4000 steps per day with spouse.   - Reports occasional shortness of breath a/w anxiety for which Dr. Houston was worked her up for in the past.  - Had normal NST within the last 24 months which was reportedly unremarkable.  - OK for outpatient follow up for ischemic screening with Dr. Houston.  - Start ASA 81mg PO daily      Problem/Plan -2  Problem: Hypertension  - BP currently well controlled.   - Home meds: olmesartan/HCTZ 20/12.5mg daily, amlodipine 5mg PO daily, propanolol 40mg PO BID  - D/t diastolic dysfunction recommend DC HCTZ and start Lasix 20mg PO daily  - HR 70-80bpm as per Endocrine. Agree with possibly changing BB for less frequent dose.       Problem/Plan -3  Problem: Thyroiditis  - c/w Prednisone taper  - c/w Metoprolol  - Will need outpatient follow up for TFT      Differential diagnosis and plan of care discussed with patient after the evaluation. Counseling on diet, nutritional counseling, weight management, exercise and medication compliance was done.   Advanced care planning/advanced directives discussed with patient/family. DNR status including forceful chest compressions to attempt to restart the heart, ventilator support/artificial breathing, electric shock, artificial nutrition, health care proxy, Molst form all discussed with pt. Pt wishes to consider. More than fifteen minutes spent on discussing advanced directives.     Abigail Adams [facility]  Jonny Pepe DO [facility]  Cardiovascular Medicine  [address] [place] [address] Dr, [address]  Available for call or text via [employer]  Office [phone]   DATE OF SERVICE: 10-19-22 @ 16:47    CHIEF COMPLAINT:Patient is a 56y old  Female who presents with a chief complaint of airway monitoring and protection (19 Oct 2022 13:25)      HISTORY OF PRESENT ILLNESS:  Sanna Allen is a 56F with hx of HTN, RA on leflunomide, anxiety who p/w fever, chills, cough, n/v, decreased po intake. Patient reports that she received an EGD two weeks ago and developed a sore throat shortly thereafter. Since then has had worsening neck pain. Around 9 days ago started developing fevers T max 107, chills, diaphoresis that did not resolve despite taking tylenol. Went to PCP who prescribed azithromycin and levofloxacin, completed course. however continued to have fevers, chills. Became nauseous a few days ago and started vomiting soon after she starts eating. NBNB, just food colored. Subsequent decreased pO intake, only able to take rice pudding. Reports that her neck and jaw continue to hurt. No dysphagia, no abdominal pain, no cough, chest pain. This morning reports that she took her pulse ox and found to have low oxygen sat mid80s% and came to the ED for further evaluation.    Last steroids was in August 2022, x2 weeks, completed. In the ED, temp 101.5, , 87/56, RR 20, 87% RA. Started on HFNC, given 2.6 L LR, vanc/zosyn, ofirmev, solucortef 100 mg (13 Oct 2022 10:33)      PAST MEDICAL & SURGICAL HISTORY:  Hypertension      Rheumatoid arthritis              MEDICATIONS:  enoxaparin Injectable 40 milliGRAM(s) SubCutaneous every 24 hours  furosemide   Injectable 20 milliGRAM(s) IV Push once  propranolol 20 milliGRAM(s) Oral every 6 hours    piperacillin/tazobactam IVPB.. 3.375 Gram(s) IV Intermittent once  piperacillin/tazobactam IVPB.. 3.375 Gram(s) IV Intermittent every 8 hours      acetaminophen     Tablet .. 650 milliGRAM(s) Oral every 6 hours PRN  melatonin 3 milliGRAM(s) Oral at bedtime PRN  PARoxetine 20 milliGRAM(s) Oral daily  traZODone 25 milliGRAM(s) Oral at bedtime    pantoprazole    Tablet 40 milliGRAM(s) Oral before breakfast    dextrose 50% Injectable 25 Gram(s) IV Push once  dextrose 50% Injectable 12.5 Gram(s) IV Push once  dextrose Oral Gel 15 Gram(s) Oral once PRN  predniSONE   Tablet 40 milliGRAM(s) Oral daily    chlorhexidine 4% Liquid 1 Application(s) Topical <User Schedule>  dextrose 5%. 1000 milliLiter(s) IV Continuous <Continuous>      FAMILY HISTORY:      Non-contributory    SOCIAL HISTORY:    [- ] Tobacco  [- ] Drugs  [- ] Alcohol    Allergies    No Known Allergies    Intolerances    	    REVIEW OF SYSTEMS:  CONSTITUTIONAL: No fever  EYES: No eye pain, visual disturbances, or discharge  ENMT:  No difficulty hearing, tinnitus  NECK: No pain or stiffness  RESPIRATORY: No cough, wheezing,  CARDIOVASCULAR: No chest pain, palpitations, passing out, dizziness, or leg swelling  GASTROINTESTINAL:  No nausea, vomiting, diarrhea or constipation. No melena.  GENITOURINARY: No dysuria, hematuria  NEUROLOGICAL: No stroke like symptoms  SKIN: No burning or lesions   ENDOCRINE: No heat or cold intolerance  MUSCULOSKELETAL: No joint pain or swelling  PSYCHIATRIC: No  anxiety, mood swings  HEME/LYMPH: No bleeding gums  ALLERGY AND IMMUNOLOGIC: No hives or eczema	    All other ROS negative    PHYSICAL EXAM:  T(C): 36.6 (10-19-22 @ 15:28), Max: 37.1 (10-19-22 @ 01:03)  HR: 83 (10-19-22 @ 15:28) (83 - 109)  BP: 122/66 (10-19-22 @ 15:28) (119/76 - 147/93)  RR: 18 (10-19-22 @ 15:28) (18 - 18)  SpO2: 95% (10-19-22 @ 15:28) (95% - 97%)  Wt(kg): --  I&O's Summary    18 Oct 2022 07:01  -  19 Oct 2022 07:00  --------------------------------------------------------  IN: 1040 mL / OUT: 310 mL / NET: 730 mL    19 Oct 2022 07:01  -  19 Oct 2022 16:47  --------------------------------------------------------  IN: 480 mL / OUT: 0 mL / NET: 480 mL        Appearance: Normal	  HEENT:   Normal oral mucosa, EOMI	  Cardiovascular:  S1 S2, No JVD,    Respiratory: Lungs clear to auscultation	  Psychiatry: Alert  Gastrointestinal:  Soft, Non-tender, + BS	  Skin: No rashes   Neurologic: Non-focal  Extremities:  No edema  Vascular: Peripheral pulses palpable    	    	  	  CARDIAC MARKERS:  Labs personally reviewed by me                                  10.5   10.96 )-----------( 547      ( 19 Oct 2022 10:04 )             34.5     10-19    143  |  105  |  15  ----------------------------<  149<H>  3.7   |  27  |  0.53    Ca    9.5      19 Oct 2022 10:04  Phos  2.7     10-19  Mg     2.3     10-19    TPro  7.0  /  Alb  3.5  /  TBili  0.3  /  DBili  x   /  AST  29  /  ALT  41  /  AlkPhos  105  10-19          EKG: Personally reviewed by me - ST  Radiology: Personally reviewed by me -     < from: Xray Chest 1 View- PORTABLE-Urgent (Xray Chest 1 View- PORTABLE-Urgent .) (10.16.22 @ 11:12) >    IMPRESSION: Probable pneumonia in the right upper lobe medially, not   significantly changed.    < end of copied text >  < from: Transthoracic Echocardiogram (10.18.22 @ 09:29) >  Conclusions:  1. Normal mitral valve. Mild mitral regurgitation.  2.Mildly dilated left atrium.  LA volume index = 36 cc/m2.  Normal left ventricular internal dimensions and wall  thicknesses.Normal left ventricular systolic function. LVEF  calculated using biplane Paul's method is 65%. There is  inferior and septal hypokinesis.  Moderate diastolic  dysfunction (Stage II).  3. Normal right atrium.Normal right ventricular size and  function.  4. Normal tricuspid valve. Mild tricuspid regurgitation.  Estimated pulmonary artery systolic pressure equals 54  mm  Hg, assuming right atrial pressure equals 8 mm Hg,  consistent with moderate pulmonary pressures.  5. No pericardial effusion.  *** No previous Echo exam.    < end of copied text >  < from: CT Angio Chest PE Protocol w/ IV Cont (10.13.22 @ 11:33) >  IMPRESSION:  No pulmonary embolism.    Mild interlobular septal thickening with patchy and groundglass opacities   in the left upper lobe likely representing mild edema. There may be a   concurrent pneumonia in the left upper and lower lobes. 8 week follow-up   is recommended to assess for improvement.    < end of copied text >  < from: US Thyroid (10.17.22 @ 16:39) >  Heterogeneous thyroid gland, consistent with history of thyroiditis.    Left thyroid gland lower pole 1.2 cm complex cystic nodule likely   represents a colloid cyst containing hemorrhage. TI-RAD1.    < end of copied text >      Assessment /Plan:     56F PMHx of HTN, RA (on leflunomide for >1yr), GERD, anxiety, recent EGD/Colonoscopy just prior to symptom onset presenting with 2 weeks of fevers refractory to outpatient Azithromycin and levaquin initially admitted to MICU for management of septic shock initially requiring HFNC and 1 day of vasopressors currently being treated for pneumonia and thyroiditis.     Problem/Plan -1  Problem: Abnormal TTE Findings  - Follows with outpatient Cardiologist Dr. Te Houston  - ECG shows Sinus Tachycardia with no ischemia noted  - TTE shows preserved EF, inferior and septal hypokinesis, moderate diastolic dysfunction (Stage II)  - Overall asymptomatic. Has excellent functional capacity. Walks ~4000 steps per day with spouse.   - Reports occasional shortness of breath a/w anxiety for which Dr. Houston was worked her up for in the past.  - Had normal NST within the last 24 months which was reportedly unremarkable.  - OK for outpatient follow up for ischemic screening with Dr. Houston. Has FU with her cardio on Friday in 2 days  - Start ASA 81mg PO daily      Problem/Plan -2  Problem: Hypertension  - BP currently well controlled.   - Home meds: olmesartan/HCTZ 20/12.5mg daily, amlodipine 5mg PO daily, propanolol 40mg PO BID  - D/t diastolic dysfunction recommend DC HCTZ and start Lasix 20mg PO daily  - HR 70-80bpm as per Endocrine. Agree with possibly changing BB for less frequent dose.       Problem/Plan -3  Problem: Thyroiditis  - c/w Prednisone taper  - c/w Metoprolol  - Will need outpatient follow up for TFT          Differential diagnosis and plan of care discussed with patient after the evaluation. Counseling on diet, nutritional counseling, weight management, exercise and medication compliance was done.   Advanced care planning/advanced directives discussed with patient/family. DNR status including forceful chest compressions to attempt to restart the heart, ventilator support/artificial breathing, electric shock, artificial nutrition, health care proxy, Molst form all discussed with pt. Pt wishes to consider. More than fifteen minutes spent on discussing advanced directives.         Abigail Adams [facility]-BC  Jonny Pepe DO [facility]  Cardiovascular Medicine  [address] [place] [address] Dr, [address]  Available for call or text via [employer]  Office [phone]

## 2022-10-19 NOTE — PROGRESS NOTE ADULT - PROBLEM SELECTOR PLAN 1
Currently on Bipap 14/8 with prominent tachypnea and tachycardia, CTAB, no stridor. RRT 10/15 for RF, likely in s/o initial volume resuscitation.   - 10/16: BIPAP de-escalated to HFNC --> Nasal cannula 6LNC 10/17: 2lnc 10/18 2LNC   - c/w prednisone  - Diurese PRN

## 2022-10-19 NOTE — PROGRESS NOTE ADULT - PROVIDER SPECIALTY LIST ADULT
Internal Medicine
Pulmonology
Endocrinology
Internal Medicine
MICU
Endocrinology
Internal Medicine

## 2022-10-19 NOTE — DISCHARGE NOTE NURSING/CASE MANAGEMENT/SOCIAL WORK - PATIENT PORTAL LINK FT
You can access the FollowMyHealth Patient Portal offered by [facility] by registering at the following website: [email or web address]. By joining [employer]’s FollowMyHealth portal, you will also be able to view your health information using other applications (apps) compatible with our system.

## 2022-10-19 NOTE — PROGRESS NOTE ADULT - ASSESSMENT
Patient is a 56 F with history of HTN, RA, GERD presented with 2 weeks of fever refractory to Azithromycin and Levaquin, admitted for septic shock and acute hypoxic respiratory failure secondary to pneumonia now resolved. Endocrine consulted for thyroiditis.     # Thyrotoxicosis due to subacute  thyroiditis  - Pt with low TSH and elevated Free T4 in the setting of subacute symptoms of hyperthyroidism over the past 2 weeks s/p sore throat and EGD with associated neck pain with CT revealing thyroiditis.  - Pain is now improving   - Can continue with Prednisone 40 mg daily for 5 days (10/18-10/24) and taper down 10 mg every 7 days until outpatient follow up with endocrinology   - Typically, 4-8 week course of prednisone is required  - Will need to repeat TFT as outpatient   - Titrate Beta blocker to goal HR 70-80 - dose increased to Propanolol 20mg q6h earlier today - hold if SBP < 100 or HR < 60 (note that once per day Atenolol dosing may be better for compliance purpose can start with Atenolol 50 mg daily)     # Thyroid nodule  - Thyroid ultrasound shows a cystic nodule, does not need FNA or follow up     # Hyperglycemia, likely steroid-induced  A1c 4.9%   - Continue with Low dose Admelog correction scale qac and separate low dose Admelog correction scale qhs if needed  Goal -180    Coty Hill MD  Department of Endocrinology, diabetes and metabolism   Pager [phone]    For follow up questions, discharge recommendations, or new consults please call answering service at [phone] (weekdays), [phone] (nights/weekends). For nonurgent matters, please email [email or web address] or [email or web address]. Patient should follow up with endocrinology at [address] [place] [address]. Emailed  to set up appt.

## 2022-10-19 NOTE — CONSULT NOTE ADULT - REASON FOR ADMISSION
airway monitoring and protection

## 2022-10-19 NOTE — PROGRESS NOTE ADULT - PROBLEM SELECTOR PLAN 2
Sepsis 2/2 suspected viral syndrome c/b pna vs aspiration event. Complicated by acute hypoxic respiratory failure now de-escalated to HFNC  -c/w Zosyn (10/13-   - per pulm can transition to amoxicillin for a total of 10d course.   -c/w O2 supplementation, wean as tolerated  -Incentive Spirometer  -Blood and urine cx negative Sepsis 2/2 suspected viral syndrome c/b pna vs aspiration event. Complicated by acute hypoxic respiratory failure now de-escalated to HFNC  -c/w Zosyn (10/13-   - per pulm can transition to amoxicillin for a total of 10d course.   -c/w O2 supplementation, wean as tolerated  -Incentive Spirometer  -Blood and urine cx negative.

## 2022-10-19 NOTE — CONSULT NOTE ADULT - NS ATTEND OPT1 GEN_ALL_CORE
I attest my time as attending is greater than 50% of the total combined time spent on qualifying patient care activities by the PA/NP and attending.
I attest my time as attending is greater than 50% of the total combined time spent on qualifying patient care activities by the PA/NP and attending.
oral

## 2022-10-19 NOTE — DISCHARGE NOTE NURSING/CASE MANAGEMENT/SOCIAL WORK - NSDCPEFALRISK_GEN_ALL_CORE
For information on Fall & Injury Prevention, visit: [email or web address] OR  [email or web address] OR  https://www.cdc.gov/steadi/patient.html

## 2022-10-19 NOTE — PROGRESS NOTE ADULT - SUBJECTIVE AND OBJECTIVE BOX
ENDOCRINE FOLLOW UP     Chief Complaint: hyperthyroidism    Interval history:   Patient seen with her  at the bedside. Feeling good, potentially go home today.   Switched to Prednisone 40 mg daily propanolol 20mg po q6h. HR 80s-90s, BP stable. Autoimmune Ab negative.   TSH remains suppressed <0.001  FT4 4.1  TT3 286-->159  Thyroid ultrasound reviewed which showed cystic nodule, does not warrant FNA at this time.      MEDICATIONS  (STANDING):  chlorhexidine 4% Liquid 1 Application(s) Topical <User Schedule>  dextrose 5%. 1000 milliLiter(s) (50 mL/Hr) IV Continuous <Continuous>  dextrose 5%. 1000 milliLiter(s) (100 mL/Hr) IV Continuous <Continuous>  dextrose 50% Injectable 25 Gram(s) IV Push once  dextrose 50% Injectable 12.5 Gram(s) IV Push once  dextrose 50% Injectable 25 Gram(s) IV Push once  enoxaparin Injectable 40 milliGRAM(s) SubCutaneous every 24 hours  furosemide   Injectable 20 milliGRAM(s) IV Push once  glucagon  Injectable 1 milliGRAM(s) IntraMuscular once  insulin lispro (ADMELOG) corrective regimen sliding scale   SubCutaneous three times a day before meals  insulin lispro (ADMELOG) corrective regimen sliding scale   SubCutaneous at bedtime  pantoprazole    Tablet 40 milliGRAM(s) Oral before breakfast  PARoxetine 20 milliGRAM(s) Oral daily  piperacillin/tazobactam IVPB.. 3.375 Gram(s) IV Intermittent every 8 hours  piperacillin/tazobactam IVPB.. 3.375 Gram(s) IV Intermittent once  predniSONE   Tablet 40 milliGRAM(s) Oral daily  propranolol 20 milliGRAM(s) Oral every 6 hours  traZODone 25 milliGRAM(s) Oral at bedtime    MEDICATIONS  (PRN):  acetaminophen     Tablet .. 650 milliGRAM(s) Oral every 6 hours PRN Temp greater or equal to 38C (100.4F), Mild Pain (1 - 3)  dextrose Oral Gel 15 Gram(s) Oral once PRN Blood Glucose LESS THAN 70 milliGRAM(s)/deciliter  melatonin 3 milliGRAM(s) Oral at bedtime PRN Insomnia        Allergies    No Known Allergies    Intolerances        ROS: All other systems reviewed and negative    PHYSICAL EXAM:   Vital Signs Last 24 Hrs  T(C): 36.6 (19 Oct 2022 10:15), Max: 37.1 (19 Oct 2022 01:03)  T(F): 97.9 (19 Oct 2022 10:15), Max: 98.7 (19 Oct 2022 01:03)  HR: 88 (19 Oct 2022 10:15) (84 - 109)  BP: 127/71 (19 Oct 2022 10:15) (117/70 - 147/93)  BP(mean): --  RR: 18 (19 Oct 2022 10:15) (18 - 18)  SpO2: 97% (19 Oct 2022 10:15) (95% - 97%)    Parameters below as of 19 Oct 2022 10:15  Patient On (Oxygen Delivery Method): room air        GENERAL: NAD, resting comfortably   EYES: No proptosis,  anicteric  THYROID: nontender, no palpable nodules appreciated on my exam   HEENT:  Atraumatic, Normocephalic, moist mucous membranes  RESPIRATORY: Nonlabored respirations on room air, normal rate/effort   CARDIOVASCULAR: Regular rate and rhythm  GI: Soft, nontender, non distended  NEURO: AOx3, moves all extremities spontaneously   PSYCH:  reactive affect, euthymic mood     10-19    143  |  105  |  15  ----------------------------<  149<H>  3.7   |  27  |  0.53    Ca    9.5      19 Oct 2022 10:04  Phos  2.7     10-19  Mg     2.3     10-19    TPro  7.0  /  Alb  3.5  /  TBili  0.3  /  DBili  x   /  AST  29  /  ALT  41  /  AlkPhos  105  10-19        A1C with Estimated Average Glucose Result: 4.9 % (10-16-22 @ 07:29)      Thyroid Stimulating Hormone, Serum: 0.02 uIU/mL (10-13-22 @ 08:17)

## 2022-10-19 NOTE — PROGRESS NOTE ADULT - ATTENDING COMMENTS
Patient seen and examined   Labs and vitals are reviewed  feels much improved and looking forward to go home   on room air and saturating well. no chest pain   TTE noted   inferior wall hypokinesia   compared with old TTE and its new   A/P- 55 Y/O/F with PMHx HTN, RA (on Leflunomide for >1yr), GERD, Anxiety who was initially admitted to MICU with septic shock and hypoxic respiratory  failure and found to have suspected aspiration pneumonia and c/b RRT and volume overload responded to Lasix off BIAAP and now on room air saturating well   TTE noted with good EF 65% but mild hypokinesis but no chest pain   may consider cardiology eval but she has good cardiology follow up as an out patient with Dr Houston and further work up can be done as an out patient.   will continue propanolol. ARB and will d/w card regarding HCTZ vs lasix   Acute thyroiditis- Thyrotoxicosis continue propanolol f/u TFTS as an out patient   out patient endo follow up.- taper steroid   can change IV  Abx  to PO   Leukocytosis - mild on steroid   d/w patient and her  at bedside  DC planning in next 24

## 2022-10-20 LAB
CULTURE RESULTS: SIGNIFICANT CHANGE UP
SPECIMEN SOURCE: SIGNIFICANT CHANGE UP
THYROGLOBULIN REFLEX TO MS OR IA RESULT: <1.8 IU/ML — SIGNIFICANT CHANGE UP

## 2022-11-18 ENCOUNTER — APPOINTMENT (OUTPATIENT)
Dept: ENDOCRINOLOGY | Facility: CLINIC | Age: 57
End: 2022-11-18

## 2022-11-18 VITALS
WEIGHT: 114 LBS | TEMPERATURE: 97.5 F | HEART RATE: 82 BPM | HEIGHT: 60 IN | DIASTOLIC BLOOD PRESSURE: 80 MMHG | BODY MASS INDEX: 22.38 KG/M2 | SYSTOLIC BLOOD PRESSURE: 130 MMHG | OXYGEN SATURATION: 95 %

## 2022-11-18 PROCEDURE — 99215 OFFICE O/P EST HI 40 MIN: CPT

## 2022-11-18 NOTE — HISTORY OF PRESENT ILLNESS
[FreeTextEntry1] : Patient is 57 F with history of HTN, RA on Leflunomide, anxiety here for follow up after recent admission. \par \par FH: no family history of thyroid disorders\par SH: denies smoking, alcohol \par Went through menopause at age 52 \par \par # Hyperthyroidism/Thyroiditis \par She presented with fever, chills, cough, n/v, decreased po intake, admitted for sepsis secondary to pneumonia refractory to outpatient azithromycin and levaquin, initally had septic shock requiring HFNC and vasopressors, eventually stabilized on room air and improved with IV antibiotics. Endocrine consulted for thyrotoxicosis secondary to thyroiditis. No prior history of thyroid disease. No family history of thyroid disorders. Received IV contrast in the ER. No radiation to the head or neck. No use of lithium or amiodarone. No immunotherapy, but on lefluonmide for RA. No dysphagia or dysphonia. Had pain in the anterior neck. Patient had symptoms of 2 weeks of palpitations, anxiety, tremors, and weight loss. No diarrhea. Patient was initially started on Solumedrol 30 mg, eventually tapered to Prednisone 80 mg daily, being tapered down by 20 mg eventually to 20 mg stopped on 11/7. \par \par Her blood work on 11/4 done at outside lab shows TSH 0.37 (just below normal) and normal FT4 \par \par # Thyroid nodule \par Thyroid ultrasound shows heterogenous thyroid gland consistent with history of thyroiditis and left thyroid gland lower pole 1.2 cm complex cystic nodule represents a colloid cyst. TSH receptor Ab and TSI negative. \par \par # HTN\par On Losartan 25 mg daily and Amlodipine 5 mg daily \par \par 11/18 visit: Then she started develop low grade fever and recurrent of pain in her neck, and called her family doctor and resumed Prednisone 10 mg daily started on 11/11 and stopped on 11/17. She is still on Propranolol 40mg TID for heart rate control, denies palpitations, diaphoresis. Currently denies neck pain or dysphagia or hoarseness. \par \par

## 2022-11-18 NOTE — ASSESSMENT
[FreeTextEntry1] : Patient is 57 F with history of HTN, RA on Leflunomide, anxiety here for hyperthyroidism/thyroiditis. \par \par # Hyperthyroidism/Thyroiditis \par - Developed subacute thyroiditis in the setting of pneumonia \par - No family history of thyroid disease, no history of radiation to neck , priot TSI, Trab negative \par - Now s/p prednisone taper but had recurrence of neck pain after stopping the steroid\par - Currently appears euthyroid on Propranolol 40 mg TID\par - We discussed to hold off on further prednisone for now, until we establish the status of her thyroid function. \par - Recheck TSH, FT4, TT3, Thyrotropin receptor Ab, TSI, TPO \par - Can slowly taper off Propranolol if TFT showing euthyroid\par \par # Thyroid nodule \par - Thyroid ultrasound shows heterogenous thyroid gland consistent with history of thyroiditis and left thyroid gland lower pole 1.2 cm complex cystic nodule represents a colloid cyst \par - Had recurrence of neck pain after stopping the steroid, repeat thyroid ultrasound with Dr. Sommer on 11/28 (to evaluate for heterogeneity of thyroid gland and to evaluate for low flow which is consistent with thyroiditis)\par \par # HTN\par - /80\par - Continue with Losartan 25 mg daily and Amlodipine 5 mg daily \par \par RTC in 3 months\par \par Coty Hill MD\par Endocrinology, Diabetes and Metabolism\par [address] [facility]\[place] [address]\par Tel [phone]\par Fax [phone]\par \par  \par

## 2022-11-21 LAB
ALBUMIN SERPL ELPH-MCNC: 4.1 G/DL
ALP BLD-CCNC: 77 U/L
ALT SERPL-CCNC: 29 U/L
ANION GAP SERPL CALC-SCNC: 10 MMOL/L
AST SERPL-CCNC: 19 U/L
BASOPHILS # BLD AUTO: 0.08 K/UL
BASOPHILS NFR BLD AUTO: 0.7 %
BILIRUB SERPL-MCNC: 0.2 MG/DL
BUN SERPL-MCNC: 14 MG/DL
CALCIUM SERPL-MCNC: 9.8 MG/DL
CHLORIDE SERPL-SCNC: 100 MMOL/L
CO2 SERPL-SCNC: 29 MMOL/L
CREAT SERPL-MCNC: 0.64 MG/DL
EGFR: 102 ML/MIN/1.73M2
EOSINOPHIL # BLD AUTO: 0.21 K/UL
EOSINOPHIL NFR BLD AUTO: 1.9 %
GLUCOSE SERPL-MCNC: 84 MG/DL
HCT VFR BLD CALC: 37.6 %
HGB BLD-MCNC: 11.6 G/DL
IMM GRANULOCYTES NFR BLD AUTO: 1.9 %
LYMPHOCYTES # BLD AUTO: 3.92 K/UL
LYMPHOCYTES NFR BLD AUTO: 34.9 %
MAN DIFF?: NORMAL
MCHC RBC-ENTMCNC: 29 PG
MCHC RBC-ENTMCNC: 30.9 GM/DL
MCV RBC AUTO: 94 FL
MONOCYTES # BLD AUTO: 0.59 K/UL
MONOCYTES NFR BLD AUTO: 5.3 %
NEUTROPHILS # BLD AUTO: 6.22 K/UL
NEUTROPHILS NFR BLD AUTO: 55.3 %
PLATELET # BLD AUTO: 403 K/UL
POTASSIUM SERPL-SCNC: 4 MMOL/L
PROT SERPL-MCNC: 6.4 G/DL
RBC # BLD: 4 M/UL
RBC # FLD: 15.2 %
SODIUM SERPL-SCNC: 139 MMOL/L
T3 SERPL-MCNC: 76 NG/DL
T4 FREE SERPL-MCNC: 0.8 NG/DL
THYROGLOB AB SERPL-ACNC: <20 IU/ML
THYROGLOB SERPL-MCNC: 346 NG/ML
THYROPEROXIDASE AB SERPL IA-ACNC: <10 IU/ML
TSH RECEPTOR AB: <1.1 IU/L
TSH SERPL-ACNC: 24 UIU/ML
TSI ACT/NOR SER: <0.1 IU/L
WBC # FLD AUTO: 11.23 K/UL

## 2022-11-28 ENCOUNTER — APPOINTMENT (OUTPATIENT)
Dept: ENDOCRINOLOGY | Facility: CLINIC | Age: 57
End: 2022-11-28

## 2023-02-20 NOTE — PROGRESS NOTE ADULT - PROBLEM SELECTOR PROBLEM 2
Sepsis due to pneumonia Retention Suture Text: Retention sutures were placed to support the closure and prevent dehiscence.

## 2023-03-24 ENCOUNTER — APPOINTMENT (OUTPATIENT)
Dept: ENDOCRINOLOGY | Facility: CLINIC | Age: 58
End: 2023-03-24

## 2023-04-25 NOTE — PROGRESS NOTE ADULT - PROBLEM/PLAN-3
DISPLAY PLAN FREE TEXT
PRINCIPAL DISCHARGE DIAGNOSIS  Diagnosis: Mouth bleeding  Assessment and Plan of Treatment:     
DISPLAY PLAN FREE TEXT
DISPLAY PLAN FREE TEXT

## 2023-11-09 ENCOUNTER — APPOINTMENT (OUTPATIENT)
Dept: ENDOCRINOLOGY | Facility: CLINIC | Age: 58
End: 2023-11-09

## 2024-03-12 NOTE — PROGRESS NOTE ADULT - SUBJECTIVE AND OBJECTIVE BOX
Attempted to call patient. No answer. No ability to leave message on voice mail   INCOMPLETE NOTE    Iván Vizcarra | PGY1| Pager: [phone]  Interval Events:    REVIEW OF SYSTEMS:  CONSTITUTIONAL: No weakness, fevers or chills  EYES/ENT: No visual changes;  No vertigo or throat pain   NECK: No pain or stiffness  RESPIRATORY: No cough, wheezing, hemoptysis; No shortness of breath  CARDIOVASCULAR: No chest pain or palpitations  GASTROINTESTINAL: No abdominal or epigastric pain. No nausea, vomiting, or hematemesis; No diarrhea or constipation. No melena or hematochezia.  GENITOURINARY: No dysuria, frequency or hematuria  NEUROLOGICAL: No numbness or weakness  SKIN: No itching, burning, rashes, or lesions   All other review of systems is negative unless indicated above.    OBJECTIVE:  ICU Vital Signs Last 24 Hrs  T(C): 37.1 (19 Oct 2022 01:03), Max: 37.1 (19 Oct 2022 01:03)  T(F): 98.7 (19 Oct 2022 01:03), Max: 98.7 (19 Oct 2022 01:03)  HR: 94 (19 Oct 2022 05:44) (82 - 109)  BP: 143/79 (19 Oct 2022 05:44) (117/70 - 147/93)  BP(mean): --  ABP: --  ABP(mean): --  RR: 18 (19 Oct 2022 01:03) (18 - 18)  SpO2: 95% (19 Oct 2022 01:03) (95% - 97%)    O2 Parameters below as of 19 Oct 2022 01:03  Patient On (Oxygen Delivery Method): room air              10-18 @ 07:01  -  10-19 @ 07:00  --------------------------------------------------------  IN: 1040 mL / OUT: 310 mL / NET: 730 mL      CAPILLARY BLOOD GLUCOSE      POCT Blood Glucose.: 127 mg/dL (18 Oct 2022 21:24)      PHYSICAL EXAM:  General: WN/WD NAD  Neurology: A&Ox3, nonfocal, HERNANDEZ x 4  Eyes: PERRLA/ EOMI, Gross vision intact  ENT/Neck: Neck supple, trachea midline, No JVD, Gross hearing intact  Respiratory: CTA B/L, No wheezing, rales, rhonchi  CV: RRR, +S1/S2, -S3/S4, no murmurs, rubs or gallops  Abdominal: Soft, NT, ND +BS, No HSM  MSK: 5/5 strength UE/LE bilaterally  Extremities: No edema, 2+ peripheral pulses  Skin: No Rashes, Hematoma, Ecchymosis  Incisions:   Tubes:    HOSPITAL MEDICATIONS:  MEDICATIONS  (STANDING):  chlorhexidine 4% Liquid 1 Application(s) Topical <User Schedule>  dextrose 5%. 1000 milliLiter(s) (50 mL/Hr) IV Continuous <Continuous>  dextrose 5%. 1000 milliLiter(s) (100 mL/Hr) IV Continuous <Continuous>  dextrose 50% Injectable 25 Gram(s) IV Push once  dextrose 50% Injectable 12.5 Gram(s) IV Push once  dextrose 50% Injectable 25 Gram(s) IV Push once  enoxaparin Injectable 40 milliGRAM(s) SubCutaneous every 24 hours  furosemide   Injectable 20 milliGRAM(s) IV Push once  glucagon  Injectable 1 milliGRAM(s) IntraMuscular once  insulin lispro (ADMELOG) corrective regimen sliding scale   SubCutaneous three times a day before meals  insulin lispro (ADMELOG) corrective regimen sliding scale   SubCutaneous at bedtime  pantoprazole    Tablet 40 milliGRAM(s) Oral before breakfast  PARoxetine 20 milliGRAM(s) Oral daily  piperacillin/tazobactam IVPB.. 3.375 Gram(s) IV Intermittent every 8 hours  piperacillin/tazobactam IVPB.. 3.375 Gram(s) IV Intermittent once  predniSONE   Tablet 40 milliGRAM(s) Oral daily  propranolol 20 milliGRAM(s) Oral every 6 hours  traZODone 25 milliGRAM(s) Oral at bedtime    MEDICATIONS  (PRN):  acetaminophen     Tablet .. 650 milliGRAM(s) Oral every 6 hours PRN Temp greater or equal to 38C (100.4F), Mild Pain (1 - 3)  dextrose Oral Gel 15 Gram(s) Oral once PRN Blood Glucose LESS THAN 70 milliGRAM(s)/deciliter  melatonin 3 milliGRAM(s) Oral at bedtime PRN Insomnia      LABS:                        9.6    10.78 )-----------( 564      ( 18 Oct 2022 15:54 )             31.3     Hgb Trend: 9.6<--, 9.2<--, 9.3<--, 8.8<--, 9.2<--  10-18    142  |  105  |  19  ----------------------------<  167<H>  4.0   |  25  |  0.80    Ca    9.6      18 Oct 2022 15:54  Phos  2.6     10-18  Mg     2.2     10-18    TPro  6.7  /  Alb  3.2<L>  /  TBili  0.3  /  DBili  x   /  AST  20  /  ALT  39  /  AlkPhos  113  10-18    Creatinine Trend: 0.80<--, 0.50<--, 0.45<--, 0.54<--, 0.53<--, 0.72<--            MICROBIOLOGY:      Iván Vizcarra | PGY1| Pager: [phone]  Interval Events: No events overnight. feels well, hr stable.     OBJECTIVE:  ICU Vital Signs Last 24 Hrs  T(C): 37.1 (19 Oct 2022 01:03), Max: 37.1 (19 Oct 2022 01:03)  T(F): 98.7 (19 Oct 2022 01:03), Max: 98.7 (19 Oct 2022 01:03)  HR: 94 (19 Oct 2022 05:44) (82 - 109)  BP: 143/79 (19 Oct 2022 05:44) (117/70 - 147/93)  BP(mean): --  ABP: --  ABP(mean): --  RR: 18 (19 Oct 2022 01:03) (18 - 18)  SpO2: 95% (19 Oct 2022 01:03) (95% - 97%)    O2 Parameters below as of 19 Oct 2022 01:03  Patient On (Oxygen Delivery Method): room air    10-18 @ 07:01  -  10-19 @ 07:00  --------------------------------------------------------  IN: 1040 mL / OUT: 310 mL / NET: 730 mL      CAPILLARY BLOOD GLUCOSE      POCT Blood Glucose.: 127 mg/dL (18 Oct 2022 21:24)      PHYSICAL EXAM:  General: WN/WD NAD  Neurology: A&Ox3, nonfocal, HERNANDEZ x 4  Eyes: PERRLA/ EOMI, Gross vision intact  ENT/Neck: Neck supple, trachea midline, No JVD, Gross hearing intact  Respiratory: CTA B/L, No wheezing, rales, rhonchi  CV: RRR, +S1/S2, -S3/S4, no murmurs, rubs or gallops  Abdominal: Soft, NT, ND +BS, No HSM  MSK: 5/5 strength UE/LE bilaterally  Extremities: No edema, 2+ peripheral pulses  Skin: No Rashes, Hematoma, Ecchymosis  Incisions:   Tubes:    HOSPITAL MEDICATIONS:  MEDICATIONS  (STANDING):  chlorhexidine 4% Liquid 1 Application(s) Topical <User Schedule>  dextrose 5%. 1000 milliLiter(s) (50 mL/Hr) IV Continuous <Continuous>  dextrose 5%. 1000 milliLiter(s) (100 mL/Hr) IV Continuous <Continuous>  dextrose 50% Injectable 25 Gram(s) IV Push once  dextrose 50% Injectable 12.5 Gram(s) IV Push once  dextrose 50% Injectable 25 Gram(s) IV Push once  enoxaparin Injectable 40 milliGRAM(s) SubCutaneous every 24 hours  furosemide   Injectable 20 milliGRAM(s) IV Push once  glucagon  Injectable 1 milliGRAM(s) IntraMuscular once  insulin lispro (ADMELOG) corrective regimen sliding scale   SubCutaneous three times a day before meals  insulin lispro (ADMELOG) corrective regimen sliding scale   SubCutaneous at bedtime  pantoprazole    Tablet 40 milliGRAM(s) Oral before breakfast  PARoxetine 20 milliGRAM(s) Oral daily  piperacillin/tazobactam IVPB.. 3.375 Gram(s) IV Intermittent every 8 hours  piperacillin/tazobactam IVPB.. 3.375 Gram(s) IV Intermittent once  predniSONE   Tablet 40 milliGRAM(s) Oral daily  propranolol 20 milliGRAM(s) Oral every 6 hours  traZODone 25 milliGRAM(s) Oral at bedtime    MEDICATIONS  (PRN):  acetaminophen     Tablet .. 650 milliGRAM(s) Oral every 6 hours PRN Temp greater or equal to 38C (100.4F), Mild Pain (1 - 3)  dextrose Oral Gel 15 Gram(s) Oral once PRN Blood Glucose LESS THAN 70 milliGRAM(s)/deciliter  melatonin 3 milliGRAM(s) Oral at bedtime PRN Insomnia      LABS:                        9.6    10.78 )-----------( 564      ( 18 Oct 2022 15:54 )             31.3     Hgb Trend: 9.6<--, 9.2<--, 9.3<--, 8.8<--, 9.2<--  10-18    142  |  105  |  19  ----------------------------<  167<H>  4.0   |  25  |  0.80    Ca    9.6      18 Oct 2022 15:54  Phos  2.6     10-18  Mg     2.2     10-18    TPro  6.7  /  Alb  3.2<L>  /  TBili  0.3  /  DBili  x   /  AST  20  /  ALT  39  /  AlkPhos  113  10-18    Creatinine Trend: 0.80<--, 0.50<--, 0.45<--, 0.54<--, 0.53<--, 0.72<--            MICROBIOLOGY:

## 2024-06-20 ENCOUNTER — APPOINTMENT (OUTPATIENT)
Dept: ENDOCRINOLOGY | Facility: CLINIC | Age: 59
End: 2024-06-20
Payer: COMMERCIAL

## 2024-06-20 VITALS
BODY MASS INDEX: 22.58 KG/M2 | SYSTOLIC BLOOD PRESSURE: 130 MMHG | HEART RATE: 68 BPM | WEIGHT: 115 LBS | HEIGHT: 60 IN | OXYGEN SATURATION: 96 % | DIASTOLIC BLOOD PRESSURE: 80 MMHG

## 2024-06-20 DIAGNOSIS — E06.9 THYROIDITIS, UNSPECIFIED: ICD-10-CM

## 2024-06-20 DIAGNOSIS — E05.90 THYROTOXICOSIS, UNSPECIFIED W/OUT THYROTOXIC CRISIS OR STORM: ICD-10-CM

## 2024-06-20 DIAGNOSIS — I10 ESSENTIAL (PRIMARY) HYPERTENSION: ICD-10-CM

## 2024-06-20 PROCEDURE — 99214 OFFICE O/P EST MOD 30 MIN: CPT

## 2024-06-20 RX ORDER — LEVOTHYROXINE SODIUM 0.05 MG/1
50 TABLET ORAL
Qty: 90 | Refills: 3 | Status: DISCONTINUED | COMMUNITY
Start: 2022-11-21 | End: 2024-06-20

## 2024-06-20 NOTE — ASSESSMENT
[FreeTextEntry1] : Patient is 58 F with history of HTN, RA on Leflunomide, anxiety here for hyperthyroidism/thyroiditis.   # Hyperthyroidism/Thyroiditis  - Developed subacute thyroiditis in the setting of pneumonia  - No family history of thyroid disease, no history of radiation to neck , prior TSI, Trab negative  - Now s/p prednisone taper but had recurrence of neck pain after stopping the steroid - Currently appears euthyroid with normal TFT   # Thyroid nodule  - Thyroid ultrasound shows heterogenous thyroid gland consistent with history of thyroiditis and left thyroid gland lower pole 1.2 cm complex cystic nodule represents a colloid cyst  - Had recurrence of neck pain after stopping the steroid, repeat thyroid ultrasound with Dr. Sommer on 11/28 (to evaluate for heterogeneity of thyroid gland and to evaluate for low flow which is consistent with thyroiditis) - Repeat TFT wnl and no thyroid nodule in most recent ultrasound  - No need for follow up as no more thyroid nodule found   # HTN - /80 - Continue with Losartan/HCTZ 12.5 mg-50 mg   # HLD - On crestor 5 mg daily  - Follow up with PCP   No need for endocrine follow up. Can follow up with PCP  Coty Hill MD Endocrinology, Diabetes and Metabolism [address] [place] [address]. [address] [place] [address] Tel [phone] Fax [phone]

## 2024-06-20 NOTE — HISTORY OF PRESENT ILLNESS
[FreeTextEntry1] : Patient is 58 F with history of HTN, RA on Leflunomide, anxiety here for follow up for thyroiditis. Last visit with me 11/2022  FH: no family history of thyroid disorders SH: denies smoking, alcohol  Went through menopause at age 52   # Hyperthyroidism/Thyroiditis  She presented with fever, chills, cough, n/v, decreased po intake, admitted for sepsis secondary to pneumonia refractory to outpatient azithromycin and levaquin, initally had septic shock requiring HFNC and vasopressors, eventually stabilized on room air and improved with IV antibiotics. Endocrine consulted for thyrotoxicosis secondary to thyroiditis. No prior history of thyroid disease. No family history of thyroid disorders. Received IV contrast in the ER. No radiation to the head or neck. No use of lithium or amiodarone. No immunotherapy, but on lefluonmide for RA. No dysphagia or dysphonia. Had pain in the anterior neck. Patient had symptoms of 2 weeks of palpitations, anxiety, tremors, and weight loss. No diarrhea. Patient was initially started on Solumedrol 30 mg, eventually tapered to Prednisone 80 mg daily, being tapered down by 20 mg eventually to 20 mg stopped on 11/7.   Her blood work on 11/4 done at outside lab shows TSH 0.37 (just below normal) and normal FT4   # Thyroid nodule  Thyroid ultrasound shows heterogenous thyroid gland consistent with history of thyroiditis and left thyroid gland lower pole 1.2 cm complex cystic nodule represents a colloid cyst. TSH receptor Ab and TSI negative.   # HTN On Losartan 25 mg daily and Amlodipine 5 mg daily   11/18 visit: Then she started develop low grade fever and recurrent of pain in her neck, and called her family doctor and resumed Prednisone 10 mg daily started on 11/11 and stopped on 11/17. She is still on Propranolol 40mg TID for heart rate control, denies palpitations, diaphoresis. Currently denies neck pain or dysphagia or hoarseness.   11/2022 labs  TSH 24 started on Levothyroxine 50 mcg daily  FT4 0.8 TT3 76  05/17/2024 labs  TSH 2.9 FT4 1.1 Thyroid ultrasound 05/2024 shows heterogenous thyroid echotexture suggesting underlying thyroid disease vs thyroiditis, no solid thyroid nodule   6/20/2024 visit: only been on levothyroxine for 1 month. Stopped and had normal TFT.

## 2024-10-16 ENCOUNTER — EMERGENCY (EMERGENCY)
Facility: HOSPITAL | Age: 59
LOS: 1 days | Discharge: HOME | End: 2024-10-16
Attending: EMERGENCY MEDICINE
Payer: COMMERCIAL

## 2024-10-16 VITALS
OXYGEN SATURATION: 95 % | RESPIRATION RATE: 17 BRPM | DIASTOLIC BLOOD PRESSURE: 91 MMHG | SYSTOLIC BLOOD PRESSURE: 152 MMHG | HEART RATE: 80 BPM | TEMPERATURE: 99 F

## 2024-10-16 VITALS
OXYGEN SATURATION: 97 % | HEART RATE: 89 BPM | RESPIRATION RATE: 20 BRPM | TEMPERATURE: 99 F | WEIGHT: 110.01 LBS | SYSTOLIC BLOOD PRESSURE: 162 MMHG | DIASTOLIC BLOOD PRESSURE: 79 MMHG

## 2024-10-16 LAB
ALBUMIN SERPL ELPH-MCNC: 4 G/DL — SIGNIFICANT CHANGE UP (ref 3.3–5)
ALP SERPL-CCNC: 86 U/L — SIGNIFICANT CHANGE UP (ref 40–120)
ALT FLD-CCNC: 38 U/L — SIGNIFICANT CHANGE UP (ref 10–45)
ANION GAP SERPL CALC-SCNC: 14 MMOL/L — SIGNIFICANT CHANGE UP (ref 5–17)
AST SERPL-CCNC: 19 U/L — SIGNIFICANT CHANGE UP (ref 10–40)
BASOPHILS # BLD AUTO: 0 K/UL — SIGNIFICANT CHANGE UP (ref 0–0.2)
BASOPHILS NFR BLD AUTO: 0 % — SIGNIFICANT CHANGE UP (ref 0–2)
BILIRUB SERPL-MCNC: 0.3 MG/DL — SIGNIFICANT CHANGE UP (ref 0.2–1.2)
BUN SERPL-MCNC: 20 MG/DL — SIGNIFICANT CHANGE UP (ref 7–23)
CALCIUM SERPL-MCNC: 9.5 MG/DL — SIGNIFICANT CHANGE UP (ref 8.4–10.5)
CHLORIDE SERPL-SCNC: 96 MMOL/L — SIGNIFICANT CHANGE UP (ref 96–108)
CO2 SERPL-SCNC: 27 MMOL/L — SIGNIFICANT CHANGE UP (ref 22–31)
CREAT SERPL-MCNC: 0.56 MG/DL — SIGNIFICANT CHANGE UP (ref 0.5–1.3)
EGFR: 105 ML/MIN/1.73M2 — SIGNIFICANT CHANGE UP
EGFR: 105 ML/MIN/1.73M2 — SIGNIFICANT CHANGE UP
EOSINOPHIL # BLD AUTO: 0 K/UL — SIGNIFICANT CHANGE UP (ref 0–0.5)
EOSINOPHIL NFR BLD AUTO: 0 % — SIGNIFICANT CHANGE UP (ref 0–6)
FLUAV AG NPH QL: SIGNIFICANT CHANGE UP
FLUBV AG NPH QL: SIGNIFICANT CHANGE UP
GLUCOSE SERPL-MCNC: 129 MG/DL — HIGH (ref 70–99)
HCT VFR BLD CALC: 34.6 % — SIGNIFICANT CHANGE UP (ref 34.5–45)
HGB BLD-MCNC: 11 G/DL — LOW (ref 11.5–15.5)
LYMPHOCYTES # BLD AUTO: 1.69 K/UL — SIGNIFICANT CHANGE UP (ref 1–3.3)
LYMPHOCYTES # BLD AUTO: 15 % — SIGNIFICANT CHANGE UP (ref 13–44)
MANUAL SMEAR VERIFICATION: SIGNIFICANT CHANGE UP
MCHC RBC-ENTMCNC: 29.6 PG — SIGNIFICANT CHANGE UP (ref 27–34)
MCHC RBC-ENTMCNC: 31.8 GM/DL — LOW (ref 32–36)
MCV RBC AUTO: 93 FL — SIGNIFICANT CHANGE UP (ref 80–100)
METAMYELOCYTES # FLD: 0.9 % — HIGH (ref 0–0)
METAMYELOCYTES NFR BLD: 0.9 % — HIGH (ref 0–0)
MONOCYTES # BLD AUTO: 0.89 K/UL — SIGNIFICANT CHANGE UP (ref 0–0.9)
MONOCYTES NFR BLD AUTO: 7.9 % — SIGNIFICANT CHANGE UP (ref 2–14)
MYELOCYTES NFR BLD: 1.8 % — HIGH (ref 0–0)
NEUTROPHILS # BLD AUTO: 8.16 K/UL — HIGH (ref 1.8–7.4)
NEUTROPHILS NFR BLD AUTO: 72.6 % — SIGNIFICANT CHANGE UP (ref 43–77)
PLAT MORPH BLD: NORMAL — SIGNIFICANT CHANGE UP
PLATELET # BLD AUTO: 397 K/UL — SIGNIFICANT CHANGE UP (ref 150–400)
POTASSIUM SERPL-MCNC: 3.1 MMOL/L — LOW (ref 3.5–5.3)
POTASSIUM SERPL-SCNC: 3.1 MMOL/L — LOW (ref 3.5–5.3)
PROCALCITONIN SERPL-MCNC: 0.06 NG/ML — SIGNIFICANT CHANGE UP (ref 0.02–0.1)
PROT SERPL-MCNC: 7.4 G/DL — SIGNIFICANT CHANGE UP (ref 6–8.3)
RBC # BLD: 3.72 M/UL — LOW (ref 3.8–5.2)
RBC # FLD: 13 % — SIGNIFICANT CHANGE UP (ref 10.3–14.5)
RBC BLD AUTO: SIGNIFICANT CHANGE UP
RSV RNA NPH QL NAA+NON-PROBE: SIGNIFICANT CHANGE UP
SARS-COV-2 RNA SPEC QL NAA+PROBE: SIGNIFICANT CHANGE UP
SODIUM SERPL-SCNC: 137 MMOL/L — SIGNIFICANT CHANGE UP (ref 135–145)
VARIANT LYMPHS # BLD: 1.8 % — SIGNIFICANT CHANGE UP (ref 0–6)
VARIANT LYMPHS NFR BLD MANUAL: 1.8 % — SIGNIFICANT CHANGE UP (ref 0–6)
WBC # BLD: 11.24 K/UL — HIGH (ref 3.8–10.5)
WBC # FLD AUTO: 11.24 K/UL — HIGH (ref 3.8–10.5)

## 2024-10-16 PROCEDURE — 99284 EMERGENCY DEPT VISIT MOD MDM: CPT

## 2024-10-16 PROCEDURE — 85025 COMPLETE CBC W/AUTO DIFF WBC: CPT

## 2024-10-16 PROCEDURE — 71046 X-RAY EXAM CHEST 2 VIEWS: CPT

## 2024-10-16 PROCEDURE — 87637 SARSCOV2&INF A&B&RSV AMP PRB: CPT

## 2024-10-16 PROCEDURE — 80053 COMPREHEN METABOLIC PANEL: CPT

## 2024-10-16 PROCEDURE — 99284 EMERGENCY DEPT VISIT MOD MDM: CPT | Mod: 25

## 2024-10-16 PROCEDURE — 84145 PROCALCITONIN (PCT): CPT

## 2024-10-16 PROCEDURE — 71046 X-RAY EXAM CHEST 2 VIEWS: CPT | Mod: 26

## 2024-10-16 RX ADMIN — Medication 40 MILLIEQUIVALENT(S): at 20:46

## 2025-02-20 NOTE — PROVIDER CONTACT NOTE (OTHER) - BACKGROUND
Admitted for sepsis/respiratory failure/thyroiditis. PMH: HTN, RA, septic shock, anemia, hypokalemia. Pt MICU transfer.
Admitted for sepsis/thyroiditis/respiratory failure. PMH: HTN, RA, anemia, hypokalemia, septic shock
Patient admitted for sepsis
quit one month ago

## 2025-05-02 ENCOUNTER — APPOINTMENT (OUTPATIENT)
Age: 60
End: 2025-05-02
Payer: COMMERCIAL

## 2025-05-02 ENCOUNTER — NON-APPOINTMENT (OUTPATIENT)
Age: 60
End: 2025-05-02

## 2025-05-02 PROCEDURE — 92012 INTRM OPH EXAM EST PATIENT: CPT

## 2025-05-02 PROCEDURE — 92133 CPTRZD OPH DX IMG PST SGM ON: CPT
